# Patient Record
Sex: FEMALE | Race: WHITE | NOT HISPANIC OR LATINO | Employment: FULL TIME | URBAN - METROPOLITAN AREA
[De-identification: names, ages, dates, MRNs, and addresses within clinical notes are randomized per-mention and may not be internally consistent; named-entity substitution may affect disease eponyms.]

---

## 2022-10-24 ENCOUNTER — AMB VIDEO VISIT (OUTPATIENT)
Dept: OTHER | Facility: HOSPITAL | Age: 32
End: 2022-10-24

## 2022-10-24 VITALS — RESPIRATION RATE: 16 BRPM | TEMPERATURE: 99 F

## 2022-10-24 DIAGNOSIS — J01.40 ACUTE PANSINUSITIS, RECURRENCE NOT SPECIFIED: Primary | ICD-10-CM

## 2022-10-24 RX ORDER — AMOXICILLIN 875 MG/1
875 TABLET, COATED ORAL 2 TIMES DAILY
Qty: 20 TABLET | Refills: 0 | Status: SHIPPED | OUTPATIENT
Start: 2022-10-24 | End: 2022-11-03

## 2022-10-24 NOTE — PROGRESS NOTES
Video Visit - Sohan Mercer 27 y o  female MRN: [id number]    REQUIRED DOCUMENTATION:         1  This service was provided via [facility]  2  Provider located at [address] [place] [id number] [phone]  3  [employer] [employer] provider: TIMO Stearns  4  Identify all parties in room with patient during [employer] [employer] visit:  patient   5  After connecting through [employer], patient was identified by name and date of birth  Patient was then informed that this was a Telemedicine visit and that the exam was being conducted confidentially over secure lines  My office door was closed  No one else was in the room  Patient acknowledged consent and understanding of privacy and security of the Telemedicine visit  I informed the patient that I have reviewed their record in Epic and presented the opportunity for them to ask any questions regarding the visit today  The patient agreed to participate  This is a 27year old female here today for video visit  She started having symptoms about 6 days ago  She had sore throat, congestion and fever  Fever resolved  She continues to have headache, sinus congestion and low grade fever  No chest pain or sob/ chest congestion  She is vaccinated for covid  Tested positive for covid  No loss of taste or smell  She is 9 weeks pregnant  No cramping or abd pain, she had light spotting last week but no further symptoms  Patient will discuss with ob/gyn  Review of Systems   Constitutional: Positive for fatigue and fever  Negative for activity change and chills  HENT: Positive for congestion, rhinorrhea, sinus pressure and sinus pain  Respiratory: Negative for cough, shortness of breath and wheezing  Cardiovascular: Negative  Skin: Negative  Neurological: Negative  Psychiatric/Behavioral: Negative        Vitals:    10/24/22 1134   Resp: 16   Temp: 99 °F (37 2 °C)     Physical Exam  Constitutional:       General: She is not in acute distress  Appearance: Normal appearance  She is not ill-appearing or toxic-appearing  HENT:      Head: Normocephalic and atraumatic  Nose: Congestion present  Eyes:      Conjunctiva/sclera: Conjunctivae normal    Pulmonary:      Effort: Pulmonary effort is normal  No respiratory distress  Comments: No cough or audible wheezing during video visit  Musculoskeletal:      Cervical back: Normal range of motion  Skin:     Comments: No rash on head or neck  Neurological:      Mental Status: She is alert and oriented to person, place, and time  Psychiatric:         Mood and Affect: Mood normal          Behavior: Behavior normal          Thought Content: Thought content normal          Judgment: Judgment normal        Diagnoses and all orders for this visit:    Acute pansinusitis, recurrence not specified  -     amoxicillin (AMOXIL) 875 mg tablet; Take 1 tablet (875 mg total) by mouth 2 (two) times a day for 10 days      Patient Instructions     Will treat for sinus infection  Rest and drink extra fluids  Start antibiotic  Take probiotic  OTC cough and cold medication  Follow up with PCP  Go to ER with any worsening symptoms, chest pain or sob  As we discussed if you develop any spotting, cramping abd pain you need to go to ER  Sinusitis   WHAT YOU NEED TO KNOW:   Sinusitis is inflammation or infection of your sinuses  Sinusitis is most often caused by a virus  Acute sinusitis may last up to 12 weeks  Chronic sinusitis lasts longer than 12 weeks  Recurrent sinusitis means you have 4 or more infections in 1 year  DISCHARGE INSTRUCTIONS:   Return to the emergency department if:   · You have trouble breathing or wheezing that is getting worse  · You have a stiff neck, a fever, or a bad headache  · You cannot open your eye  · Your eyeball bulges out or you cannot move your eye       · You are more sleepy than normal, or you notice changes in your ability to think, move, or talk  · You have swelling of your forehead or scalp  Call your doctor if:   · You have vision changes, such as double vision  · Your eye and eyelid are red, swollen, and painful  · Your symptoms do not improve or go away after 10 days  · You have nausea and are vomiting  · Your nose is bleeding  · You have questions or concerns about your condition or care  Medicines: Your symptoms may go away on their own  Your healthcare provider may recommend watchful waiting for up to 10 days before starting antibiotics  You may need any of the following:  · Acetaminophen  decreases pain and fever  It is available without a doctor's order  Ask how much to take and how often to take it  Follow directions  Read the labels of all other medicines you are using to see if they also contain acetaminophen, or ask your doctor or pharmacist  Acetaminophen can cause liver damage if not taken correctly  Do not use more than 4 grams (4,000 milligrams) total of acetaminophen in one day  · NSAIDs , such as ibuprofen, help decrease swelling, pain, and fever  This medicine is available with or without a doctor's order  NSAIDs can cause stomach bleeding or kidney problems in certain people  If you take blood thinner medicine, always ask your healthcare provider if NSAIDs are safe for you  Always read the medicine label and follow directions  · Nasal steroid sprays  may help decrease inflammation in your nose and sinuses  · Decongestants  help reduce swelling and drain mucus in the nose and sinuses  They may help you breathe easier  · Antihistamines  help dry mucus in the nose and relieve sneezing  · Antibiotics  help treat or prevent a bacterial infection  · Take your medicine as directed  Contact your healthcare provider if you think your medicine is not helping or if you have side effects  Tell him or her if you are allergic to any medicine   Keep a list of the medicines, vitamins, and herbs you take  Include the amounts, and when and why you take them  Bring the list or the pill bottles to follow-up visits  Carry your medicine list with you in case of an emergency  Self-care:   · Rinse your sinuses as directed  Use a sinus rinse device to rinse your nasal passages with a saline (salt water) solution or distilled water  Do not use tap water  This will help thin the mucus in your nose and rinse away pollen and dirt  It will also help reduce swelling so you can breathe normally  · Use a humidifier  to increase air moisture in your home  This may make it easier for you to breathe and help decrease your cough  · Sleep with your head elevated  Place an extra pillow under your head before you go to sleep to help your sinuses drain  · Drink liquids as directed  Ask your healthcare provider how much liquid to drink each day and which liquids are best for you  Liquids will thin the mucus in your nose and help it drain  Avoid drinks that contain alcohol or caffeine  · Do not smoke, and avoid secondhand smoke  Nicotine and other chemicals in cigarettes and cigars can make your symptoms worse  Ask your healthcare provider for information if you currently smoke and need help to quit  E-cigarettes or smokeless tobacco still contain nicotine  Talk to your healthcare provider before you use these products  Prevent the spread of germs:   · Wash your hands often with soap and water  Wash your hands after you use the bathroom, change a child's diaper, or sneeze  Wash your hands before you prepare or eat food  · Stay away from people who are sick  Some germs spread easily and quickly through contact  Follow up with your doctor as directed: You may be referred to an ear, nose, and throat specialist  Write down your questions so you remember to ask them during your visits     © Copyright [employer] 2022 Information is for End User's use only and may not be sold, redistributed or otherwise used for commercial purposes  All illustrations and images included in CareNotes® are the copyrighted property of A D A M , Inc  or Hari Everett  The above information is an  only  It is not intended as medical advice for individual conditions or treatments  Talk to your doctor, nurse or pharmacist before following any medical regimen to see if it is safe and effective for you  Follow up with PCP if not improved, if symptoms are worse, go to the ER

## 2022-10-24 NOTE — CARE ANYWHERE EVISITS
[facility] - Gender: Female - Date of Birth: [id number]  Date: [id number] - Duration: 8 minutes  [facility]  Provider: Dylan GREENWOOD    Patient Contact Information  Address  [address] [place] [address]; [address]  [phone]    Visit Topics  Flu-Like Symptoms [Added By: Self - 2022-10-24]  have covid, symptoms still at 7 days and am pregnant [Added By: Self - 2022-10-24]    Triage Questions   What is your current physical address in the event of a medical emergency? Answer []  Are you allergic to any medications? Answer []  Are you now or could you be pregnant? Answer []  Do you have any immune system compromise or chronic lung   disease? Answer []  Do you have any vulnerable family members in the home (infant, pregnant, cancer, elderly)? Answer []     Conversation Transcripts  [0A][0A] [Notification] You are connected with Alberto Marshall, Urgent Care Specialist [0A][Notification] Jose Carlos Pickett is located in [address] [place] [address]  [0A][Notification] Jose Carlos Pickett has shared health history  Lawrence Snowch  [0A]    Diagnosis  Acute pansinusitis    Procedures  Value: [id number] Code: CPT-4 UNLISTED E&M SERVICE    Medications Prescribed    No prescriptions ordered    Electronically signed by: Alberto Hope(NPI [id number])

## 2022-10-24 NOTE — PATIENT INSTRUCTIONS
Will treat for sinus infection  Rest and drink extra fluids  Start antibiotic  Take probiotic  OTC cough and cold medication  Follow up with PCP  Go to ER with any worsening symptoms, chest pain or sob  As we discussed if you develop any spotting, cramping abd pain you need to go to ER  Sinusitis   WHAT YOU NEED TO KNOW:   Sinusitis is inflammation or infection of your sinuses  Sinusitis is most often caused by a virus  Acute sinusitis may last up to 12 weeks  Chronic sinusitis lasts longer than 12 weeks  Recurrent sinusitis means you have 4 or more infections in 1 year  DISCHARGE INSTRUCTIONS:   Return to the emergency department if:   You have trouble breathing or wheezing that is getting worse  You have a stiff neck, a fever, or a bad headache  You cannot open your eye  Your eyeball bulges out or you cannot move your eye  You are more sleepy than normal, or you notice changes in your ability to think, move, or talk  You have swelling of your forehead or scalp  Call your doctor if:   You have vision changes, such as double vision  Your eye and eyelid are red, swollen, and painful  Your symptoms do not improve or go away after 10 days  You have nausea and are vomiting  Your nose is bleeding  You have questions or concerns about your condition or care  Medicines: Your symptoms may go away on their own  Your healthcare provider may recommend watchful waiting for up to 10 days before starting antibiotics  You may need any of the following:  Acetaminophen  decreases pain and fever  It is available without a doctor's order  Ask how much to take and how often to take it  Follow directions  Read the labels of all other medicines you are using to see if they also contain acetaminophen, or ask your doctor or pharmacist  Acetaminophen can cause liver damage if not taken correctly  Do not use more than 4 grams (4,000 milligrams) total of acetaminophen in one day  NSAIDs , such as ibuprofen, help decrease swelling, pain, and fever  This medicine is available with or without a doctor's order  NSAIDs can cause stomach bleeding or kidney problems in certain people  If you take blood thinner medicine, always ask your healthcare provider if NSAIDs are safe for you  Always read the medicine label and follow directions  Nasal steroid sprays  may help decrease inflammation in your nose and sinuses  Decongestants  help reduce swelling and drain mucus in the nose and sinuses  They may help you breathe easier  Antihistamines  help dry mucus in the nose and relieve sneezing  Antibiotics  help treat or prevent a bacterial infection  Take your medicine as directed  Contact your healthcare provider if you think your medicine is not helping or if you have side effects  Tell him or her if you are allergic to any medicine  Keep a list of the medicines, vitamins, and herbs you take  Include the amounts, and when and why you take them  Bring the list or the pill bottles to follow-up visits  Carry your medicine list with you in case of an emergency  Self-care:   Rinse your sinuses as directed  Use a sinus rinse device to rinse your nasal passages with a saline (salt water) solution or distilled water  Do not use tap water  This will help thin the mucus in your nose and rinse away pollen and dirt  It will also help reduce swelling so you can breathe normally  Use a humidifier  to increase air moisture in your home  This may make it easier for you to breathe and help decrease your cough  Sleep with your head elevated  Place an extra pillow under your head before you go to sleep to help your sinuses drain  Drink liquids as directed  Ask your healthcare provider how much liquid to drink each day and which liquids are best for you  Liquids will thin the mucus in your nose and help it drain  Avoid drinks that contain alcohol or caffeine       Do not smoke, and avoid secondhand smoke  Nicotine and other chemicals in cigarettes and cigars can make your symptoms worse  Ask your healthcare provider for information if you currently smoke and need help to quit  E-cigarettes or smokeless tobacco still contain nicotine  Talk to your healthcare provider before you use these products  Prevent the spread of germs:   Wash your hands often with soap and water  Wash your hands after you use the bathroom, change a child's diaper, or sneeze  Wash your hands before you prepare or eat food  Stay away from people who are sick  Some germs spread easily and quickly through contact  Follow up with your doctor as directed: You may be referred to an ear, nose, and throat specialist  Write down your questions so you remember to ask them during your visits  © Copyright [employer] 2022 Information is for End User's use only and may not be sold, redistributed or otherwise used for commercial purposes  All illustrations and images included in CareNotes® are the copyrighted property of A D A M , Inc  or [facility] Rich Preciado   The above information is an  only  It is not intended as medical advice for individual conditions or treatments  Talk to your doctor, nurse or pharmacist before following any medical regimen to see if it is safe and effective for you

## 2022-10-27 ENCOUNTER — ULTRASOUND (OUTPATIENT)
Dept: OBGYN CLINIC | Facility: CLINIC | Age: 32
End: 2022-10-27

## 2022-10-27 VITALS
WEIGHT: 170 LBS | BODY MASS INDEX: 28.32 KG/M2 | RESPIRATION RATE: 18 BRPM | HEART RATE: 109 BPM | SYSTOLIC BLOOD PRESSURE: 129 MMHG | HEIGHT: 65 IN | DIASTOLIC BLOOD PRESSURE: 75 MMHG

## 2022-10-27 DIAGNOSIS — Z32.01 PREGNANCY TEST POSITIVE: Primary | ICD-10-CM

## 2022-10-27 LAB — SL AMB POCT URINE HCG: NORMAL

## 2022-10-27 PROCEDURE — 81025 URINE PREGNANCY TEST: CPT | Performed by: OBSTETRICS & GYNECOLOGY

## 2022-10-27 PROCEDURE — 76830 TRANSVAGINAL US NON-OB: CPT | Performed by: OBSTETRICS & GYNECOLOGY

## 2022-10-27 PROCEDURE — 99203 OFFICE O/P NEW LOW 30 MIN: CPT | Performed by: OBSTETRICS & GYNECOLOGY

## 2022-10-27 NOTE — PROGRESS NOTES
[facility] SCAN  Rm Viramontes; 1990  [id number]      Assessment  27 y o   at 9w5d presenting for amenorrhea  Pregnancy confirmed, dating consistent with LMP  BETINA 22  Plan  Diagnoses and all orders for this visit:    Pregnancy test positive  -     POCT urine HCG  -     Prenatal Panel; Future  -     Cystic fibrosis gene test; Future  -     Spinal muscular atrophy DNA; Future  -     Ambulatory Referral to Maternal Fetal Medicine; Future      - Prenatal vitamin  - Prenatal panel (slips given today)  - Discussed OTC Vit B6/Unisom  - Discussed genetic screening, will proceed with nuchal translucency scan  - Prenatal Nursing Intake in 2 weeks  - Prenatal H&P in 4 weeks     ____________________________________________________________      Subjective   Rm Viramontes is a 27 y o   with an LMP of Patient's last menstrual period was 2022  who presents for amenorrhea  She notes she took a home pregnancy test and it was positive  She has some mild nausea & heartburn    Patient notes that this pregnancy was semi-planned, she came off the pill but was not expecting to get pregnant so quickly  She had some minor spotting and cramping since her LMP        Objective  /75 (BP Location: Left arm, Patient Position: Sitting, Cuff Size: Adult)   Pulse (!) 109   Resp 18   Ht 5' 5" (1 651 m)   Wt 77 1 kg (170 lb)   LMP 2022   BMI 28 29 kg/m²       Transvaginal Pelvic Ultrasound  Burns IUP  CRL 2 97, consistent with EGA 9w5d  +yolk sac  +cardiac activity  FHR 160s  No adnexal masses appreciated    Homa Hand DO  10/27/22

## 2022-10-27 NOTE — PATIENT INSTRUCTIONS
Pregnancy   AMBULATORY CARE:   What you need to know about pregnancy:  A normal pregnancy lasts about 40 weeks  The first trimester lasts from your last period through the 12th week of pregnancy  The second trimester lasts from the 13th week through the 23rd week  The third trimester lasts from the 24th week until your baby is born  If you know the date of your last period, your healthcare provider can estimate your due date  You may give birth to your baby any time from 37 weeks to 2 weeks after your due date  Seek care immediately if:   You develop a severe headache that does not go away  You have new or increased vision changes, such as blurred or spotted vision  You have new or increased swelling in your face or hands  You have pain or cramping in your abdomen or low back  You have vaginal bleeding  Call your doctor or obstetrician if:   You have abdominal cramps, pressure, or tightening  You have a change in vaginal discharge  You cannot keep food or drinks down, and you are losing weight  You have chills or a fever  You have vaginal itching, burning, or pain  You have yellow, green, white, or foul-smelling vaginal discharge  You have pain or burning when you urinate, less urine than usual, or pink or bloody urine  You have questions or concerns about your condition or care  Prenatal care:  Prenatal care is a series of visits with your healthcare provider throughout your pregnancy  Prenatal care can help prevent problems during pregnancy and childbirth  At each prenatal visit, your provider will weigh you and check your blood pressure  He or she will also check your baby's heartbeat and growth  You may also need the following at some visits:  A pelvic exam  allows your healthcare provider to see your cervix (the bottom part of your uterus)  Your healthcare provider will use a speculum to open your vagina  He or she will check the size and shape of your uterus   At your first prenatal visit, you may also have a Pap smear  This is a test to check your cervix for abnormal cells  Blood tests  may be done to check for any of the following:     Gestational diabetes or anemia (low iron level)    Blood type or Rh factor, or certain birth defects    Immunity to certain diseases, such as chickenpox or rubella    An infection, such as a sexually transmitted infection, HIV, or hepatitis B    Hepatitis B  may need to be prevented or treated  Hepatitis B is inflammation of the liver caused by the hepatitis B virus (HBV)  HBV can spread from a mother to her baby during delivery  You will be checked for HBV as early as possible in the first trimester of each pregnancy  You need the test even if you received the hepatitis B vaccine or were tested before  You may need to have an HBV infection treated before you give birth  Urine tests  may also be done to check for sugar and protein  These can be signs of gestational diabetes or preeclampsia  Urine tests may also be done to check for signs of infection  A gestational diabetes screen  may be done  Your healthcare provider may order either a 1-step or 2-step oral glucose tolerance test (OGTT)  1-step OGTT:  Your blood sugar level will be tested after you have not eaten for 8 hours (fasting)  You will then be given a glucose drink  Your level will be tested again 1 hour and 2 hours after you finish the drink  2-step OGTT:  You do not have to fast for the first part of the test  You will have the glucose drink at any time of day  Your blood sugar level will be checked 1 hour later  If your blood sugar is higher than a certain level, another test will be ordered  You will fast and your blood sugar level will be tested  You will have the glucose drink  Your blood will be tested again 1 hour, 2 hours, and 3 hours after you finish the glucose drink  A fetal ultrasound  shows pictures of your baby inside your uterus   The pictures are used to check your baby's development, movement, and position  Genetic disorder screening tests  may be offered to you  These tests check your baby's risk for genetic disorders such as Down syndrome  A screening test may include blood tests and an ultrasound  Blood tests may be used to check your DNA or your partner's DNA  Genetic tests are not always accurate or complete  Your baby may be born with a genetic disorder that did not show up in the tests  Talk to your healthcare provider about any concerns you have with genetic testing  Body changes that may occur during your pregnancy:   Breast changes  you will experience include tenderness and tingling during the early part of your pregnancy  Your breasts will become larger  You may need to use a support bra  You may see a thin, yellow fluid, called colostrum, leak from your nipples during the second trimester  Colostrum is a liquid that changes to milk about 3 days after you give birth  Skin changes and stretch marks  may occur during your pregnancy  You may have red marks, called stretch marks, on your skin  Stretch marks will usually fade after pregnancy  Use lotion if your skin is dry and itchy  The skin on your face, around your nipples, and below your belly button may darken  Most of the time, your skin will return to its normal color after your baby is born  Morning sickness  is nausea and vomiting that can happen at any time of day  Avoid fatty and spicy foods  Eat small meals throughout the day instead of large meals  Nida may help to decrease nausea  Ask your healthcare provider about other ways of decreasing nausea and vomiting  Heartburn  may be caused by changes in your hormones during pregnancy  Your growing uterus may also push your stomach upward and force stomach acid to back up into your esophagus  Eat 4 or 5 small meals each day instead of large meals  Avoid spicy foods  Avoid eating right before bedtime           Constipation  may develop during your pregnancy  To treat constipation, eat foods high in fiber such as fiber cereals, beans, fruits, vegetables, whole-grain breads, and prune juice  Get regular exercise and drink plenty of water  Your healthcare provider may also suggest a fiber supplement to soften your bowel movements  Talk to your healthcare provider before you use any medicines to decrease constipation  Hemorrhoids  are enlarged veins in the rectal area  They may cause pain, itching, and bright red bleeding from your rectum  To decrease your risk for hemorrhoids, prevent constipation and do not strain to have a bowel movement  If you have hemorrhoids, soak in a tub of warm water to ease discomfort  Ask your healthcare provider how you can treat hemorrhoids  Leg cramps and swelling  may be caused by low calcium levels or the added weight of pregnancy  Raise your legs above the level of your heart to decrease swelling  During a leg cramp, stretch or massage the muscle that has the cramp  Heat may help decrease pain and muscle spasms  Apply heat on your muscle for 20 to 30 minutes every 2 hours for as many days as directed  Back pain  may occur as your baby grows  Do not stand for long periods of time or lift heavy items  Use good posture while you stand, squat, or bend  Wear low-heeled shoes with good support  Rest may also help to relieve back pain  Ask your healthcare provider about exercises you can do to strengthen your back muscles  Stay healthy during your pregnancy:       Eat a variety of healthy foods  Healthy foods include fruits, vegetables, whole-grain breads, low-fat dairy foods, beans, lean meats, and fish  Drink liquids as directed  Ask how much liquid to drink each day and which liquids are best for you  Limit caffeine to less than 200 milligrams each day  Limit your intake of fish to 2 servings each week   Choose fish low in mercury such as canned light tuna, shrimp, crab, salmon, cod, or tilapia  Do not  eat fish high in mercury such as swordfish, tilefish, rodney mackerel, and shark  Take prenatal vitamins as directed  Your need for certain vitamins and minerals, such as folic acid, increases during pregnancy  Prenatal vitamins provide some of the extra vitamins and minerals you need  Prenatal vitamins may also help to decrease the risk for certain birth defects  Ask how much weight you should gain during your pregnancy  Too much or too little weight gain can be unhealthy for you and your baby  Talk to your healthcare provider about exercise  Moderate exercise can help you stay fit  Your healthcare provider will help you plan an exercise program that is safe for you during pregnancy  Do not smoke  Smoking increases your risk for a miscarriage and heart and blood vessel problems  Smoking can cause your baby to be born too early or weigh less at birth  Quit smoking as soon as you think you might be pregnant  Ask your healthcare provider for information if you need help quitting  Do not drink alcohol  Alcohol passes from your body to your baby through the placenta  It can affect your baby's brain development and cause fetal alcohol syndrome (FAS)  FAS is a group of conditions that causes mental, behavior, and growth problems  Talk to your healthcare provider before you take any medicines  Many medicines may harm your baby if you take them when you are pregnant  Do not take any medicines, vitamins, herbs, or supplements without first talking to your healthcare provider  Never use illegal or street drugs (such as marijuana or cocaine) while you are pregnant  Safety tips:   Avoid hot tubs and saunas  Do not use a hot tub or sauna while you are pregnant, especially during your first trimester  Hot tubs and saunas may raise your baby's temperature and increase the risk for birth defects  Avoid toxoplasmosis    This is an infection caused by eating raw meat or being around infected cat feces  It can cause birth defects, miscarriages, and other problems  Wash your hands after you touch raw meat  Make sure any meat is well-cooked before you eat it  Avoid raw eggs and unpasteurized milk  Use gloves or ask someone else to clean your cat's litter box while you are pregnant  Ask your healthcare provider about travel  The most comfortable time to travel is during the second trimester  Ask your healthcare provider if you can travel after 36 weeks  You may not be able to travel in an airplane after 36 weeks  He or she may also recommend that you avoid long road trips  Follow up with your doctor or obstetrician as directed:  Go to all of your prenatal visits during your pregnancy  Write down your questions so you remember to ask them during your visits  © Copyright [employer] 2022 Information is for End User's use only and may not be sold, redistributed or otherwise used for commercial purposes  All illustrations and images included in CareNotes® are the copyrighted property of A D A M , Inc  or [facility] Rich Everett  The above information is an  only  It is not intended as medical advice for individual conditions or treatments  Talk to your doctor, nurse or pharmacist before following any medical regimen to see if it is safe and effective for you

## 2022-11-07 ENCOUNTER — APPOINTMENT (OUTPATIENT)
Dept: LAB | Facility: CLINIC | Age: 32
End: 2022-11-07

## 2022-11-07 DIAGNOSIS — Z32.01 PREGNANCY TEST POSITIVE: ICD-10-CM

## 2022-11-07 LAB
ABO GROUP BLD: NORMAL
BACTERIA UR QL AUTO: ABNORMAL /HPF
BASOPHILS # BLD AUTO: 0.05 THOUSANDS/ÂΜL (ref 0–0.1)
BASOPHILS NFR BLD AUTO: 0 % (ref 0–1)
BILIRUB UR QL STRIP: NEGATIVE
BLD GP AB SCN SERPL QL: NEGATIVE
CLARITY UR: ABNORMAL
COLOR UR: YELLOW
EOSINOPHIL # BLD AUTO: 0.09 THOUSAND/ÂΜL (ref 0–0.61)
EOSINOPHIL NFR BLD AUTO: 1 % (ref 0–6)
ERYTHROCYTE [DISTWIDTH] IN BLOOD BY AUTOMATED COUNT: 13.5 % (ref 11.6–15.1)
GLUCOSE UR STRIP-MCNC: NEGATIVE MG/DL
HCT VFR BLD AUTO: 37.1 % (ref 34.8–46.1)
HGB BLD-MCNC: 12 G/DL (ref 11.5–15.4)
HGB UR QL STRIP.AUTO: NEGATIVE
IMM GRANULOCYTES # BLD AUTO: 0.05 THOUSAND/UL (ref 0–0.2)
IMM GRANULOCYTES NFR BLD AUTO: 0 % (ref 0–2)
KETONES UR STRIP-MCNC: NEGATIVE MG/DL
LEUKOCYTE ESTERASE UR QL STRIP: ABNORMAL
LYMPHOCYTES # BLD AUTO: 3.14 THOUSANDS/ÂΜL (ref 0.6–4.47)
LYMPHOCYTES NFR BLD AUTO: 25 % (ref 14–44)
MCH RBC QN AUTO: 26.9 PG (ref 26.8–34.3)
MCHC RBC AUTO-ENTMCNC: 32.3 G/DL (ref 31.4–37.4)
MCV RBC AUTO: 83 FL (ref 82–98)
MONOCYTES # BLD AUTO: 0.71 THOUSAND/ÂΜL (ref 0.17–1.22)
MONOCYTES NFR BLD AUTO: 6 % (ref 4–12)
MUCOUS THREADS UR QL AUTO: ABNORMAL
NEUTROPHILS # BLD AUTO: 8.34 THOUSANDS/ÂΜL (ref 1.85–7.62)
NEUTS SEG NFR BLD AUTO: 68 % (ref 43–75)
NITRITE UR QL STRIP: NEGATIVE
NON-SQ EPI CELLS URNS QL MICRO: ABNORMAL /HPF
NRBC BLD AUTO-RTO: 0 /100 WBCS
PH UR STRIP.AUTO: 5.5 [PH]
PLATELET # BLD AUTO: 297 THOUSANDS/UL (ref 149–390)
PMV BLD AUTO: 10.1 FL (ref 8.9–12.7)
PROT UR STRIP-MCNC: ABNORMAL MG/DL
RBC # BLD AUTO: 4.46 MILLION/UL (ref 3.81–5.12)
RBC #/AREA URNS AUTO: ABNORMAL /HPF
RH BLD: NEGATIVE
SP GR UR STRIP.AUTO: 1.01 (ref 1–1.03)
SPECIMEN EXPIRATION DATE: NORMAL
UROBILINOGEN UR STRIP-ACNC: <2 MG/DL
WBC # BLD AUTO: 12.38 THOUSAND/UL (ref 4.31–10.16)
WBC #/AREA URNS AUTO: ABNORMAL /HPF
WBC CLUMPS # UR AUTO: PRESENT /UL

## 2022-11-08 ENCOUNTER — INITIAL PRENATAL (OUTPATIENT)
Dept: OBGYN CLINIC | Facility: CLINIC | Age: 32
End: 2022-11-08

## 2022-11-08 ENCOUNTER — PATIENT OUTREACH (OUTPATIENT)
Dept: OBGYN CLINIC | Facility: CLINIC | Age: 32
End: 2022-11-08

## 2022-11-08 DIAGNOSIS — Z34.01 PRIMIGRAVIDA IN FIRST TRIMESTER: ICD-10-CM

## 2022-11-08 DIAGNOSIS — Z34.91 PRENATAL CARE IN FIRST TRIMESTER: Primary | ICD-10-CM

## 2022-11-08 LAB
HBV SURFACE AG SER QL: NORMAL
HIV 1+2 AB+HIV1 P24 AG SERPL QL IA: NORMAL
RPR SER QL: NORMAL
RUBV IGG SERPL IA-ACNC: 148.8 IU/ML

## 2022-11-08 NOTE — LETTER
Work Letter    [employer]  1990  [address] [id number]    Dear [employer],      11/08/22        Your employee is a patient at [facility]  We recommend that all pregnant women:    1  Have a well-ventilated workspace  2  Wear low-heeled shoes  3  Work no more than 40 hours per week  4  Have a 15 minute break every 2 hours and at least 30 minutes for a meal break  5  Use good body mechanics by bending at your knees to avoid back strain and lift no more than 20 pounds without assistance  Will need assistance with lifting over 20 lbs  6  Have ready access to bathrooms and water  She may continue to work until her due date unless medical complications arise  We anticipate she may return to work in 6-8 weeks after delivery       Sincerely,  1 Lenard Everett

## 2022-11-08 NOTE — LETTER
Dentist Letter    Taylor Benito  1990  [address] [address] [address]          11/08/22    We have had several requests from local dentist requesting permission to perform procedures on our patients who are pregnant  We wish to respond with this letter regarding some of the more routine procedures that we have been asked about  The following procedures may be performed on our obstetric patients:   1  Administration of local anesthesia   2  Administration of antibiotics such as PCN, Ampicillin, and Erythromycin  3  Administration of pain medications such as Tylenol, Tylenol with codeine, and if needed Percocet  4  Shielded X-rays    Should you have any questions, please do not hesitate to contact at [phone]          Sincerely,    1 [facility]   [phone]

## 2022-11-08 NOTE — LETTER
[address] [facility]  1990  [address] [facility] [address]       11/08/22          Rivka Devine is a patient and under our care in our office  Kenny Shepherd's Estimated Date of Delivery: 5/27/23  Any questions or concerns feel free to contact our office       Thank you,  [address]  [id number] [facility]/Mitchell Awad [address]  [address] [facility]/Brad W[address]  [facility]/[address]  [phone]

## 2022-11-08 NOTE — LETTER
Proof of Pregnancy Letter    [facility]  1990  [address] [facility] [address]        11/08/22      [facility] is a patient at our facility  [address] Carlos Estimated Date of Delivery: 5/27/23       Any questions or concerns, please feel free to contact our office  Sincerely,    [address]

## 2022-11-08 NOTE — PROGRESS NOTES
OB INTAKE INTERVIEW  Pt presents for OB intake    OB History    Para Term  AB Living   1             SAB IAB Ectopic Multiple Live Births                  # Outcome Date GA Lbr Miguel/2nd Weight Sex Delivery Anes PTL Lv   1 Current              Hx of  delivery prior to 36 weeks 6 days:  N/A   If yes, place a referral for cervical surveillance at 16 weeks  Last Menstrual Period:    Patient's last menstrual period was 2022  Ultrasound date: 10/27/2022  9 weeks 5 days     Estimated Date of Delivery: 23   by LMP  H&P visit scheduled   @ 0745  with Dr Jose M Cole     Last pap smear: 3/30/21  Findings; lab pap smear results: no abnormalities    Current Issues:  Constipation :   Yes  Headaches :   Yes  Cramping:  No  Spotting :   No  PICA cravings :  No  FOB Involved:   Yes  Planned pregnancy:  Yes  I have these concerns about this prenatal patient:   C/o constipation  Encouraged to increase water and fiber intake  Information provided regarding stool softener use  Quit smoking cigarettes and marijuana in September when pregnancy discovered      Interview education  • [facility] P[facility] Essentials reviewed and discussed   • Baby and [address] Handout  • [facility] MFM Handout  • Discussed genetic testing  • Prenatal lab work: Scripts printed and given to pt  • Influenza vaccine given today: No  • Discussed Tdap vaccine  Immunizations:   Immunization History   Administered Date(s) Administered   • COVID-19 MODERNA VACC 0 5 ML IM 12/15/2021     Depression Screening Follow-up Plan: Patient's depression screening was negative with an [place] score of  1  Clinically patient does not have depression  No treatment is required     Nurse/Family Partnership- referral placed:  Yes   If yes, place referral for nurse family partnership  BMI Counseling:   Tobacco Cessation Counseling: former   Tobacco cessation counseling was not provided   The patient is sincerely urged to quit consumption of tobacco   Infection Screening: Does the pt have a hx of MRSA? No  If yes- please follow MRSA protocol and obtain a nasal swab for MRSA culture  The patient was oriented to our practice and all questions were answered    Interviewed by: [facility] 11/08/22

## 2022-11-08 NOTE — PROGRESS NOTES
VERNON RODRIGUEZ met with 33 y/o-M- G1- English speaking woman for pre radhames assessment  Pt resides with her spouse and both are very excited with the intended pregnancy  Pt denies any current usage of drug, alcohol or smoking  Pt reported Hx of Anxiety manage by PCP  No treatment for several years  Pt denies any issues at present time  Pt works full time as a  at [employer] and denies financial concerns  Pt has commercial insurance ans will call [address] to inquire about the program     Pt claimed both extended families are very supportive  Pt denies transportation issues  Pt denies any question or concerns at this time  VERNON RODRIGUEZ explained her role and gave contact information  Pt was encouraged to call at any time needed

## 2022-11-09 LAB — BACTERIA UR CULT: NORMAL

## 2022-11-15 LAB
CF COMMENT: NORMAL
CFTR MUT ANL BLD/T: NORMAL

## 2022-11-16 LAB
CLINICAL INFO: NORMAL
ETHNIC BACKGROUND STATED: NORMAL
GENE MUT TESTED BLD/T: NORMAL
GENERAL COMMENTS:: NORMAL
LAB DIRECTOR NAME PROVIDER: NORMAL
REASON FOR REFERRAL (NARRATIVE): NORMAL
REF LAB TEST METHOD: NORMAL
SL AMB DISCLAIMER: NORMAL
SL AMB GENETIC COUNSELOR: NORMAL
SMN1 GENE MUT ANL BLD/T: NORMAL
SPECIMEN SOURCE: NORMAL

## 2022-11-16 NOTE — PROGRESS NOTES
Please refer to the [facility] ultrasound report in Ob Procedures for additional information regarding today's visit

## 2022-11-17 ENCOUNTER — ROUTINE PRENATAL (OUTPATIENT)
Dept: PERINATAL CARE | Facility: OTHER | Age: 32
End: 2022-11-17

## 2022-11-17 VITALS
HEART RATE: 83 BPM | DIASTOLIC BLOOD PRESSURE: 64 MMHG | WEIGHT: 175.4 LBS | SYSTOLIC BLOOD PRESSURE: 118 MMHG | BODY MASS INDEX: 29.19 KG/M2

## 2022-11-17 DIAGNOSIS — Z36.82 ENCOUNTER FOR ANTENATAL SCREENING FOR NUCHAL TRANSLUCENCY: Primary | ICD-10-CM

## 2022-11-17 DIAGNOSIS — Z32.01 PREGNANCY TEST POSITIVE: ICD-10-CM

## 2022-11-17 DIAGNOSIS — Z3A.12 12 WEEKS GESTATION OF PREGNANCY: ICD-10-CM

## 2022-11-17 NOTE — LETTER
November 17, 2022     [id number] [facility] PROVIDER    Patient: Antonella Soto   YOB: 1990   Date of Visit: 11/17/2022       Dear   Provider: Thank you for referring Mio Fernandez to me for evaluation  Below are my notes for this consultation  If you have questions, please do not hesitate to call me  I look forward to following your patient along with you  Sincerely,        Catherine Mckeon MD        CC: No Recipients  Catherine Mckeon MD  11/16/2022  6:12 PM  Sign when Signing Visit  Please refer to the [facility] ultrasound report in Ob Procedures for additional information regarding today's visit

## 2022-12-01 ENCOUNTER — ROUTINE PRENATAL (OUTPATIENT)
Dept: OBGYN CLINIC | Facility: CLINIC | Age: 32
End: 2022-12-01

## 2022-12-01 VITALS
WEIGHT: 179 LBS | HEART RATE: 83 BPM | HEIGHT: 65 IN | SYSTOLIC BLOOD PRESSURE: 111 MMHG | BODY MASS INDEX: 29.82 KG/M2 | DIASTOLIC BLOOD PRESSURE: 73 MMHG

## 2022-12-01 DIAGNOSIS — Z34.92 PRENATAL CARE IN SECOND TRIMESTER: ICD-10-CM

## 2022-12-01 DIAGNOSIS — O99.619 GASTROESOPHAGEAL REFLUX IN PREGNANCY: ICD-10-CM

## 2022-12-01 DIAGNOSIS — Z23 NEED FOR INFLUENZA VACCINATION: ICD-10-CM

## 2022-12-01 DIAGNOSIS — K21.9 GASTROESOPHAGEAL REFLUX IN PREGNANCY: ICD-10-CM

## 2022-12-01 DIAGNOSIS — Z11.3 SCREEN FOR STD (SEXUALLY TRANSMITTED DISEASE): ICD-10-CM

## 2022-12-01 DIAGNOSIS — Z3A.14 14 WEEKS GESTATION OF PREGNANCY: ICD-10-CM

## 2022-12-01 DIAGNOSIS — Z72.51 HIGH RISK HETEROSEXUAL BEHAVIOR: Primary | ICD-10-CM

## 2022-12-01 DIAGNOSIS — O99.711: ICD-10-CM

## 2022-12-01 DIAGNOSIS — L81.1: ICD-10-CM

## 2022-12-01 DIAGNOSIS — F41.9 ANXIETY: ICD-10-CM

## 2022-12-01 PROBLEM — Z34.90 PREGNANCY: Status: ACTIVE | Noted: 2022-12-01

## 2022-12-01 PROBLEM — O99.719: Status: ACTIVE | Noted: 2022-12-01

## 2022-12-01 NOTE — ASSESSMENT & PLAN NOTE
- Doing well today, N/V improved  - Continue prenatal vitamin  - Prenatal labs: wnl, pelvic exam and GC/CT performed today  - Initial OB U/S: wnl  - Genetic screening: wnl  - Order MSAFP? ordered  - Level 2 U/S: scheduled 1/12/2022  - 28 week labs [ ]   - Tdap [ ]   - Rhogam [ ]   - Contraception plan: to be discussed  - RTO in 4 weeks  - All questions answered to patient satisfaction

## 2022-12-01 NOTE — PROGRESS NOTES
OB/GYN  PRENATAL H&P VISIT  Varun Malik  2022  8:34 AM  Dr Javier David MD    Patient presents for initial OB H&P  Accompanied by: alone  Semi planned pregnancy, FOB involved and supportive  Occupation:       Hx of  delivery prior to 36 weeks 6 days: no  Hx of hypertension:  no   Patient's last menstrual period was 2022  Estimated Date of Delivery: 23  Hx of STD/STI: no  Hx of recent travel or travel planned in the near future: no    Signs and Symptoms of pregnancy:  - morning sickness and nausea -> resolved  - Constipation: yes   - Headaches: no  - Cramping/spotting: no  - PICA cravings: no  - Diabetes: If you answer yes, please order 1 hr gtt testing, 50grams  • History of gestational diabetes no  • BMI >35  no  • Advance maternal age >35 no  • First degree relative with type 2 diabetes no  • History of PCOS no  • Current metformin use no  • Prior history of macrosomia or LGA no      Prenatal labs collected including: prenatal panel, varicella immunity yes  Last Pap:  2021  Tdap - counseled to be given after 27 weeks  Rh negative (RhoGam needed): yes  Influenza vaccine discussed and information sheet given  Vaccinated: to be given today  Immunization Record  Immunization History   Administered Date(s) Administered   • COVID-19 MODERNA VACC 0 5 ML IM 12/15/2021   • Influenza Quadrivalent Preservative Free 3 years and older IM 2021, 12/15/2021     Hx of MRSA: no  Dental visit with last 6 months - no  If no, recommendations discussed  Depression: history or current symptoms: no    Alcohol use in pregnancy: no  Drug use in past 12 months: no  Use of nicotine or recreational drug use: no    Review of Systems   Constitutional: Negative  HENT: Negative  Respiratory: Negative  Cardiovascular: Negative  Gastrointestinal: Negative  Genitourinary: Negative  Musculoskeletal: Negative  Neurological: Negative  Psychiatric/Behavioral: Negative  All other systems reviewed and are negative  History reviewed  No pertinent past medical history  Past Surgical History:   Procedure Laterality Date   • COLONOSCOPY     • TONSILLECTOMY     • UPPER GASTROINTESTINAL ENDOSCOPY     • WISDOM TOOTH EXTRACTION         Social History     Socioeconomic History   • Marital status: Single     Spouse name: Not on file   • Number of children: Not on file   • Years of education: Not on file   • Highest education level: Not on file   Occupational History   • Not on file   Tobacco Use   • Smoking status: Former     Types: Cigarettes     Quit date: 2022     Years since quittin 2   • Smokeless tobacco: Never   Vaping Use   • Vaping Use: Never used   Substance and Sexual Activity   • Alcohol use: Not Currently     Alcohol/week: 7 0 standard drinks     Types: 7 Glasses of wine per week   • Drug use: Not Currently     Types: Marijuana   • Sexual activity: Yes     Partners: Male     Birth control/protection: None   Other Topics Concern   • Not on file   Social History Narrative   • Not on file     Social Determinants of Health     Financial Resource Strain: Low Risk    • Difficulty of Paying Living Expenses: Not hard at all   Food Insecurity: No Food Insecurity   • Worried About Running Out of Food in the Last Year: Never true   • Ran Out of Food in the Last Year: Never true   Transportation Needs: No Transportation Needs   • Lack of Transportation (Medical): No   • Lack of Transportation (Non-Medical): No   Physical Activity: Not on file   Stress: No Stress Concern Present   • Feeling of Stress :  Only a little   Social Connections: Not on file   Intimate Partner Violence: Not At Risk   • Fear of Current or Ex-Partner: No   • Emotionally Abused: No   • Physically Abused: No   • Sexually Abused: No   Housing Stability: Low Risk    • Unable to Pay for Housing in the Last Year: No   • Number of Places Lived in the Last Year: 1   • Unstable Housing in the Last Year: No       OBJECTIVE  Vitals:    22 0700   BP: 111/73   Pulse: 83     Physical Exam  Constitutional:       General: She is not in acute distress  Appearance: She is normal weight  Genitourinary:      Genitourinary Comments: Well circumscribed areas of hyperpigmentation noted on mons and left groin area, not raised/painful/itchy etc , consistent with melasma of pregnancy   HENT:      Mouth/Throat:      Mouth: Mucous membranes are moist       Pharynx: Oropharynx is clear  Cardiovascular:      Rate and Rhythm: Normal rate  Pulmonary:      Effort: Pulmonary effort is normal  No respiratory distress  Abdominal:      General: Abdomen is flat  There is no distension  Palpations: Abdomen is soft  Tenderness: There is no abdominal tenderness  Neurological:      General: No focal deficit present  Mental Status: She is alert and oriented to person, place, and time  Mental status is at baseline  Skin:     General: Skin is warm and dry  Vitals reviewed  Exam conducted with a chaperone present  ASSESSMENT AND PLAN    27 y o , , with /73   Pulse 83   Ht 5' 5" (1 651 m)   Wt 81 2 kg (179 lb)   LMP 2022 , at 14w5d here for her prenatal H&P      Problem List        Digestive    Gastroesophageal reflux in pregnancy    Current Assessment & Plan     Taking B6 and TUMS prn            Musculoskeletal and Integument    Melasma of pregnancy    Current Assessment & Plan     Located on mons and right groin area            Genitourinary    Dysmenorrhea       Other    Anxiety    Current Assessment & Plan     Not on meds, no current mood symptoms  Discussed possibility of starting SSRI in third trimester if concerned about PPD         Menorrhagia with regular cycle    Pregnancy    Current Assessment & Plan     - Doing well today, N/V improved  - Continue prenatal vitamin  - Prenatal labs: wnl, pelvic exam and GC/CT performed today  - Initial OB U/S: wnl  - Genetic screening: wnl  - Order MSAFP? ordered  - Level 2 U/S: scheduled 1/12/2022  - 28 week labs [ ]   - Tdap [ ]   - Rhogam [ ]   - Contraception plan: to be discussed  - RTO in 4 weeks  - All questions answered to patient satisfaction               D/w Dr Teresita Wang MD  12/1/2022  8:34 AM

## 2022-12-01 NOTE — ASSESSMENT & PLAN NOTE
Not on meds, no current mood symptoms  Discussed possibility of starting SSRI in third trimester if concerned about PPD

## 2022-12-02 LAB
C TRACH DNA SPEC QL NAA+PROBE: NEGATIVE
N GONORRHOEA DNA SPEC QL NAA+PROBE: NEGATIVE

## 2022-12-30 ENCOUNTER — ROUTINE PRENATAL (OUTPATIENT)
Dept: OBGYN CLINIC | Facility: CLINIC | Age: 32
End: 2022-12-30

## 2022-12-30 VITALS
HEART RATE: 87 BPM | DIASTOLIC BLOOD PRESSURE: 73 MMHG | BODY MASS INDEX: 31.49 KG/M2 | RESPIRATION RATE: 18 BRPM | WEIGHT: 189 LBS | HEIGHT: 65 IN | SYSTOLIC BLOOD PRESSURE: 114 MMHG

## 2022-12-30 DIAGNOSIS — Z34.92 PRENATAL CARE IN SECOND TRIMESTER: ICD-10-CM

## 2022-12-30 DIAGNOSIS — Z3A.18 18 WEEKS GESTATION OF PREGNANCY: Primary | ICD-10-CM

## 2022-12-30 NOTE — PROGRESS NOTES
Wendy Al presents today for routine OB visit at 18w6d  She is a   Blood Pressure: 114/73  Wt=85 7 kg (189 lb); Body mass index is 31 45 kg/m² ; TWG=8 618 kg (19 lb)  Fetal Heart Rate: 154; Fundal Height (cm): 19 cm  Abdomen: gravid, soft, non-tender  She reports no complaints or concerns  Denies uterine contractions or persistent cramping  Denies vaginal bleeding or leaking of fluid  Reports fetal movement  Scheduled for ultrasound with MFM in two weeks  Reviewed common discomforts of pregnancy in second trimester and warning signs  Advised to continue medications and return in 4 weeks  Current Outpatient Medications   Medication Instructions   • Prenatal Vit-Fe Fumarate-FA (PRENATAL PO) Oral     Laboratory workup: initial OB labs (WNL); 28 week labs (order around 28 wk); 36 week cultures (collect @36w)    Vaccinations: influenza (given 22); Tdap (will offer after 27 weeks)    Postpartum contraception: desires unsure    Fetal Ultrasounds:  22 12w5d: Normal growth  Normal nuchal translucency       Pregnancy Problems (from 10/27/22 to present)     Problem Noted Resolved    18 weeks gestation of pregnancy 2022 by TIMO Guzmán No    Pregnancy 2022 by Joanna Morgan MD No    Gastroesophageal reflux in pregnancy 2022 by Joanna Morgan MD No    Melasma of pregnancy 2022 by Joanna Morgan MD No

## 2023-01-11 NOTE — PROGRESS NOTES
Please refer to the [facility] ultrasound report in Ob Procedures for additional information regarding today's visit

## 2023-01-12 ENCOUNTER — ROUTINE PRENATAL (OUTPATIENT)
Facility: HOSPITAL | Age: 33
End: 2023-01-12
Attending: OBSTETRICS & GYNECOLOGY

## 2023-01-12 VITALS
DIASTOLIC BLOOD PRESSURE: 66 MMHG | SYSTOLIC BLOOD PRESSURE: 110 MMHG | BODY MASS INDEX: 32.62 KG/M2 | HEART RATE: 92 BPM | HEIGHT: 65 IN | WEIGHT: 195.8 LBS

## 2023-01-12 DIAGNOSIS — Z3A.20 20 WEEKS GESTATION OF PREGNANCY: Primary | ICD-10-CM

## 2023-01-12 DIAGNOSIS — O44.42 LOW-LYING PLACENTA WITHOUT HEMORRHAGE, SECOND TRIMESTER: ICD-10-CM

## 2023-01-12 DIAGNOSIS — Z36.86 ENCOUNTER FOR ANTENATAL SCREENING FOR CERVICAL LENGTH: ICD-10-CM

## 2023-01-12 DIAGNOSIS — Z36.3 ENCOUNTER FOR ANTENATAL SCREENING FOR MALFORMATIONS: ICD-10-CM

## 2023-01-12 NOTE — LETTER
January 14, 2023     [id number] [facility] PROVIDER    Patient: Karen Thompson   YOB: 1990   Date of Visit: 1/12/2023       Dear   Provider: Thank you for referring Noa James to me for evaluation  Below are my notes for this consultation  If you have questions, please do not hesitate to call me  I look forward to following your patient along with you           Sincerely,        Cain Goldmann, MD        CC: No Recipients  Cain Goldmann, MD  1/11/2023  6:20 PM  Sign when Signing Visit  Please refer to the [facility] ultrasound report in Ob Procedures for additional information regarding today's visit

## 2023-01-12 NOTE — PROGRESS NOTES
Ultrasound Probe Disinfection    A transvaginal ultrasound was performed  Prior to use, disinfection was performed with High Level Disinfection Process ([employer])  Probe serial number A3: [id number] was used        [facility]  01/12/23  12:48 PM

## 2023-01-25 PROBLEM — Z3A.22 22 WEEKS GESTATION OF PREGNANCY: Status: ACTIVE | Noted: 2022-12-30

## 2023-01-25 PROBLEM — Z67.91 RH NEGATIVE STATUS DURING PREGNANCY: Status: ACTIVE | Noted: 2023-01-25

## 2023-01-25 PROBLEM — O26.899 RH NEGATIVE STATUS DURING PREGNANCY: Status: ACTIVE | Noted: 2023-01-25

## 2023-01-25 NOTE — PROGRESS NOTES
[address]  [id number]  1990        A/P:  Problem List        Digestive    Gastroesophageal reflux in pregnancy       Musculoskeletal and Integument    Melasma of pregnancy       Genitourinary    Dysmenorrhea       Other    Anxiety    Menorrhagia with regular cycle    22 weeks gestation of pregnancy    Overview     - Doing well today, N/V improved  - Continue prenatal vitamin  - Prenatal labs: wnl  - Initial OB U/S: wnl  - Genetic screening: wnl  - MSAFP ordered, not done in time  - Level 2 U/S: low lying placenta 14 6mm from os, repeat US at 28 weeks  - 28 week labs [ ]   - Tdap [ ]   - Rhogam [ ]   - Contraception plan: likely declines  - RTO in 4 weeks  - All questions answered to patient satisfaction         Rh negative status during pregnancy    Overview     Will need rhogam at 29 weeks                S: 27 y o   22w5d here for PN visit  She denies contractions  She denies leakage of fluid and vaginal bleeding  She is unsure if she feels fetal movement  [place]anning [place] to [place] to see family in about 2 months, deciding between [address] and driving  O:  Vitals:    23 0700   BP: 112/74   Pulse: 86     Physical Exam  GEN: The patient was alert and oriented x3, pleasant well-appearing female in no acute distress     CV: Regular rate  PULM: Non-labored respirations  MSK: Normal gait  Skin: Warm, dry  Neuro: No focal deficits  Psych: Normal affect and judgement, cooperative    Fetal Heart Rate: 150       D/w Dr Giovana Simons MD  OB/GYN PGY-2  2023  8:19 AM

## 2023-01-26 ENCOUNTER — ROUTINE PRENATAL (OUTPATIENT)
Dept: OBGYN CLINIC | Facility: CLINIC | Age: 33
End: 2023-01-26

## 2023-01-26 VITALS
HEART RATE: 86 BPM | BODY MASS INDEX: 32.65 KG/M2 | HEIGHT: 65 IN | WEIGHT: 196 LBS | SYSTOLIC BLOOD PRESSURE: 112 MMHG | DIASTOLIC BLOOD PRESSURE: 74 MMHG

## 2023-01-26 DIAGNOSIS — O26.892 RH NEGATIVE STATUS DURING PREGNANCY IN SECOND TRIMESTER: ICD-10-CM

## 2023-01-26 DIAGNOSIS — Z3A.22 22 WEEKS GESTATION OF PREGNANCY: Primary | ICD-10-CM

## 2023-01-26 DIAGNOSIS — O99.619 GASTROESOPHAGEAL REFLUX IN PREGNANCY: ICD-10-CM

## 2023-01-26 DIAGNOSIS — Z67.91 RH NEGATIVE STATUS DURING PREGNANCY IN SECOND TRIMESTER: ICD-10-CM

## 2023-01-26 DIAGNOSIS — K21.9 GASTROESOPHAGEAL REFLUX IN PREGNANCY: ICD-10-CM

## 2023-02-22 PROBLEM — Z3A.26 26 WEEKS GESTATION OF PREGNANCY: Status: ACTIVE | Noted: 2022-12-30

## 2023-02-22 NOTE — PROGRESS NOTES
[address]  [id number]  1990        A/P:  Problem List        Digestive    Gastroesophageal reflux in pregnancy       Musculoskeletal and Integument    Melasma of pregnancy       Genitourinary    Dysmenorrhea       Other    Anxiety    Menorrhagia with regular cycle    26 weeks gestation of pregnancy    Overview     - Doing well today, N/V improved  - Continue prenatal vitamin  - Prenatal labs: wnl  - Initial OB U/S: wnl  - Genetic screening: wnl  - MSAFP ordered, not done in time  - Level 2 U/S: low lying placenta 14 6mm from os, repeat US at 28 weeks (sched 3/10)  - 28 week labs ordered today [ ]   - Tdap [ ] next visit  - Rhogam [ ] next visit  - Contraception plan: possible depo at 6 weeks  - RTO in 3 weeks  - All questions answered to patient satisfaction         Rh negative status during pregnancy    Overview     Will need rhogam at 29 weeks                S: 27 y o   26w5d here for PN visit  She denies contractions  She denies leakage of fluid and vaginal bleeding  She has started to feel good fetal movement  O:  Vitals:    23 0700   BP: 121/67   Pulse: 92     Physical Exam  GEN: The patient was alert and oriented x3, pleasant well-appearing female in no acute distress     CV: Regular rate  PULM: Non-labored respirations  MSK: Normal gait  Skin: Warm, dry  Neuro: No focal deficits  Psych: Normal affect and judgement, cooperative    Fetal Heart Rate: 148       D/w Dr Chris Figueroa MD  OB/GYN PGY-2  2023  8:09 AM

## 2023-02-23 ENCOUNTER — ROUTINE PRENATAL (OUTPATIENT)
Dept: OBGYN CLINIC | Facility: CLINIC | Age: 33
End: 2023-02-23

## 2023-02-23 VITALS
HEART RATE: 92 BPM | DIASTOLIC BLOOD PRESSURE: 67 MMHG | SYSTOLIC BLOOD PRESSURE: 121 MMHG | BODY MASS INDEX: 34.82 KG/M2 | HEIGHT: 65 IN | WEIGHT: 209 LBS

## 2023-02-23 DIAGNOSIS — O26.892 RH NEGATIVE STATUS DURING PREGNANCY IN SECOND TRIMESTER: ICD-10-CM

## 2023-02-23 DIAGNOSIS — Z3A.26 26 WEEKS GESTATION OF PREGNANCY: Primary | ICD-10-CM

## 2023-02-23 DIAGNOSIS — Z67.91 RH NEGATIVE STATUS DURING PREGNANCY IN SECOND TRIMESTER: ICD-10-CM

## 2023-02-24 ENCOUNTER — APPOINTMENT (OUTPATIENT)
Dept: LAB | Facility: CLINIC | Age: 33
End: 2023-02-24

## 2023-02-24 DIAGNOSIS — Z3A.26 26 WEEKS GESTATION OF PREGNANCY: ICD-10-CM

## 2023-02-24 LAB
ABO GROUP BLD: NORMAL
BLD GP AB SCN SERPL QL: NEGATIVE
ERYTHROCYTE [DISTWIDTH] IN BLOOD BY AUTOMATED COUNT: 13.3 % (ref 11.6–15.1)
GLUCOSE 1H P 50 G GLC PO SERPL-MCNC: 111 MG/DL (ref 40–134)
HCT VFR BLD AUTO: 35.5 % (ref 34.8–46.1)
HGB BLD-MCNC: 11.4 G/DL (ref 11.5–15.4)
MCH RBC QN AUTO: 27.5 PG (ref 26.8–34.3)
MCHC RBC AUTO-ENTMCNC: 32.1 G/DL (ref 31.4–37.4)
MCV RBC AUTO: 86 FL (ref 82–98)
PLATELET # BLD AUTO: 292 THOUSANDS/UL (ref 149–390)
PMV BLD AUTO: 10.4 FL (ref 8.9–12.7)
RBC # BLD AUTO: 4.14 MILLION/UL (ref 3.81–5.12)
RH BLD: NEGATIVE
SPECIMEN EXPIRATION DATE: NORMAL
TREPONEMA PALLIDUM IGG+IGM AB [PRESENCE] IN SERUM OR PLASMA BY IMMUNOASSAY: NORMAL
WBC # BLD AUTO: 13.48 THOUSAND/UL (ref 4.31–10.16)

## 2023-03-07 ENCOUNTER — ULTRASOUND (OUTPATIENT)
Facility: HOSPITAL | Age: 33
End: 2023-03-07

## 2023-03-07 VITALS
HEART RATE: 90 BPM | DIASTOLIC BLOOD PRESSURE: 80 MMHG | BODY MASS INDEX: 35.65 KG/M2 | WEIGHT: 214 LBS | SYSTOLIC BLOOD PRESSURE: 130 MMHG | HEIGHT: 65 IN

## 2023-03-07 DIAGNOSIS — Z3A.28 28 WEEKS GESTATION OF PREGNANCY: Primary | ICD-10-CM

## 2023-03-07 DIAGNOSIS — Z36.89 ENCOUNTER FOR ULTRASOUND TO ASSESS FETAL GROWTH: ICD-10-CM

## 2023-03-07 DIAGNOSIS — Z36.2 ENCOUNTER FOR FOLLOW-UP ULTRASOUND OF FETAL ANATOMY: ICD-10-CM

## 2023-03-07 DIAGNOSIS — O44.40 LOW-LYING PLACENTA: ICD-10-CM

## 2023-03-07 NOTE — PROGRESS NOTES
[address] [place] [address] [employer]: Sheri Fleming was seen today at 28w3d for followup placental location ultrasound with fetal growth measurement  See ultrasound report under "OB Procedures" tab    Please don't hesitate to contact our office with any concerns or questions   -Sarah Louise MD

## 2023-03-07 NOTE — PROGRESS NOTES
Ultrasound Probe Disinfection    A transvaginal ultrasound was performed  Prior to use, disinfection was performed with High Level Disinfection Process ([employer])  Probe serial number A1: [id number] was used        Maxcine Knuckles  03/07/23  2:49 PM

## 2023-03-07 NOTE — LETTER
March 7, 2023     Subha Ayala MD  [address]    Patient: La Nena Espinoza   YOB: 1990   Date of Visit: 3/7/2023       Dear Dr Kendrick Syrian: Thank you for referring Ar Riddle to me for evaluation  Below are my notes for this consultation  If you have questions, please do not hesitate to call me  I look forward to following your patient along with you  Sincerely,        Carli Harley MD        CC: No Recipients  Carli Harley MD  3/7/2023  3:30 PM  Sign when Signing Visit  [address] [place] [address] [employer]: La Nena Espinoza was seen today at 28w3d for followup placental location ultrasound with fetal growth measurement  See ultrasound report under "OB Procedures" tab    Please don't hesitate to contact our office with any concerns or questions   -Carli Harley MD

## 2023-03-15 PROBLEM — Z3A.29 29 WEEKS GESTATION OF PREGNANCY: Status: ACTIVE | Noted: 2022-12-30

## 2023-03-15 NOTE — PROGRESS NOTES
[address]  [id number]  1990        A/P:  Problem List        Digestive    Gastroesophageal reflux in pregnancy       Musculoskeletal and Integument    Melasma of pregnancy       Genitourinary    Dysmenorrhea       Other    Anxiety    Menorrhagia with regular cycle    29 weeks gestation of pregnancy    Overview     - Doing well today, N/V improved  - Continue prenatal vitamin  - Prenatal labs: wnl  - Initial OB U/S: wnl  - Genetic screening: wnl  - MSAFP ordered, not done in time  - Level 2 U/S: low lying placenta 14 6mm from os, repeat US at 28 weeks -> RESOLVED       Will [facility] out to [facility] about possible repeat growth due to 45+ pound WG  - 28 week labs wnl  - Tdap given today  - Rhogam given today  - Delivery consent signed 3/16/23  - Contraception plan: possible depo at 6 weeks  - RTO in 2 weeks  - All questions answered to patient satisfaction         Rh negative status during pregnancy    Overview     Will need rhogam at 28 weeks                S: 27 y o   29w5d here for PN visit  She denies contractions  She denies leakage of fluid and vaginal bleeding  She has felt good fetal movement  O:  Vitals:    23 0700   BP: 132/65   Pulse: 99     Physical Exam  GEN: The patient was alert and oriented x3, pleasant well-appearing female in no acute distress     CV: Regular rate  PULM: Non-labored respirations  MSK: Normal gait  Skin: Warm, dry  Neuro: No focal deficits  Psych: Normal affect and judgement, cooperative    Fetal Heart Rate: 147       D/w Dr Nadia Casey MD  OB/GYN PGY-2  3/16/2023  8:30 AM

## 2023-03-16 ENCOUNTER — ROUTINE PRENATAL (OUTPATIENT)
Dept: OBGYN CLINIC | Facility: CLINIC | Age: 33
End: 2023-03-16

## 2023-03-16 VITALS
WEIGHT: 217.4 LBS | HEART RATE: 99 BPM | BODY MASS INDEX: 36.22 KG/M2 | SYSTOLIC BLOOD PRESSURE: 132 MMHG | HEIGHT: 65 IN | DIASTOLIC BLOOD PRESSURE: 65 MMHG

## 2023-03-16 DIAGNOSIS — Z3A.29 29 WEEKS GESTATION OF PREGNANCY: ICD-10-CM

## 2023-03-16 DIAGNOSIS — Z67.91 RH NEGATIVE STATUS DURING PREGNANCY IN THIRD TRIMESTER: Primary | ICD-10-CM

## 2023-03-16 DIAGNOSIS — O26.893 RH NEGATIVE STATUS DURING PREGNANCY IN THIRD TRIMESTER: Primary | ICD-10-CM

## 2023-03-16 DIAGNOSIS — Z23 NEED FOR TDAP VACCINATION: ICD-10-CM

## 2023-03-16 DIAGNOSIS — Z34.93 PRENATAL CARE IN THIRD TRIMESTER: ICD-10-CM

## 2023-03-16 DIAGNOSIS — O26.03 EXCESSIVE WEIGHT GAIN DURING PREGNANCY IN THIRD TRIMESTER: ICD-10-CM

## 2023-03-16 LAB
DME PARACHUTE DELIVERY DATE REQUESTED: NORMAL
DME PARACHUTE ITEM DESCRIPTION: NORMAL
DME PARACHUTE ORDER STATUS: NORMAL
DME PARACHUTE SUPPLIER NAME: NORMAL
DME PARACHUTE SUPPLIER PHONE: NORMAL

## 2023-03-16 NOTE — PROGRESS NOTES
28 week education packet provided to patient on 03/16/23  Included in packet:   Third Trimester paperwork  Delivery consent   Birthing room support person rules and acknowledgment  Birth Plan   Welcome information  Birth certificate worksheet   Consent for Photographers  Perineal/ Vaginal massage   Pediatric practices and locations   Breast pump initiated today(pt will pick from link)  TDAP & Rhogam given today

## 2023-03-17 ENCOUNTER — TELEPHONE (OUTPATIENT)
Facility: HOSPITAL | Age: 33
End: 2023-03-17

## 2023-03-17 NOTE — TELEPHONE ENCOUNTER
Pt contacted to schedule OB referral for growth ultrasound  Pt offered multiple days and times  Pt scheduled for 4/19/23

## 2023-04-02 PROBLEM — Z3A.32 32 WEEKS GESTATION OF PREGNANCY: Status: ACTIVE | Noted: 2022-12-30

## 2023-04-03 ENCOUNTER — ROUTINE PRENATAL (OUTPATIENT)
Dept: OBGYN CLINIC | Facility: CLINIC | Age: 33
End: 2023-04-03

## 2023-04-03 VITALS
SYSTOLIC BLOOD PRESSURE: 115 MMHG | DIASTOLIC BLOOD PRESSURE: 68 MMHG | HEIGHT: 65 IN | BODY MASS INDEX: 37.92 KG/M2 | HEART RATE: 83 BPM | WEIGHT: 227.6 LBS

## 2023-04-03 DIAGNOSIS — Z67.91 RH NEGATIVE STATUS DURING PREGNANCY IN THIRD TRIMESTER: ICD-10-CM

## 2023-04-03 DIAGNOSIS — O26.893 RH NEGATIVE STATUS DURING PREGNANCY IN THIRD TRIMESTER: ICD-10-CM

## 2023-04-03 DIAGNOSIS — Z3A.32 32 WEEKS GESTATION OF PREGNANCY: Primary | ICD-10-CM

## 2023-04-03 NOTE — PROGRESS NOTES
OB/GYN  PN Visit  Gopi Negro  [id number]  4/3/2023  8:15 AM  Dr Lucy Che MD    S: 27 y o   32w1d here for PN visit  She has no obstetric complaints, including pelvic pain, contractions, vaginal bleeding, loss of fluid, or decreased fetal movement  O:  Vitals:    23 0700   BP: 115/68   Pulse: 83         Physical examination   Cardio-pulmonar  Normal vesicular murmur, no rales,  nonlabored breathing , normal S1/S2 no cardiac murmurs , no gallops   Abdomen  Soft , no tender, no signs or peritoneal irritation   Extremities  mobiles no edemas  Fundal height: 32   bpm, on bedside US  Presentation: Vertex, placenta anterior    A/P:    Problem List Items Addressed This Visit    None      D 27 y o   32w1d  here for prenatal care without obstetric complaints today  In this visit we addressed:    Problem List        Digestive    Gastroesophageal reflux in pregnancy       Musculoskeletal and Integument    Melasma of pregnancy       Genitourinary    Dysmenorrhea       Other    Anxiety    Menorrhagia with regular cycle    32 weeks gestation of pregnancy    Overview     - Doing well today, N/V improved  - Continue prenatal vitamin  - Prenatal labs: wnl  - Initial OB U/S: wnl  - Genetic screening: wnl  - MSAFP ordered, not done in time  - Level 2 U/S: low lying placenta 14 6mm from os, repeat US at 28 weeks -> RESOLVED   Last EFW 3/7/23 EFW  1426 grams - 3 lbs 2 oz   (81%), growth 23  - 28 week labs wnl  - Tdap given at 29w  - Rhogam given at 29w  - Delivery consent signed 3/16/23  - Desire Epidural  - Breastfeeding   - Follow up in 2 weeks  - Contraception plan: possible depo at 6 weeks         Rh negative status during pregnancy    Overview     s/p rhogam at 29 weeks              Patient d/w/Dr Omkar Bullock MD  4/3/2023  8:15 AM

## 2023-04-17 PROBLEM — Z3A.34 34 WEEKS GESTATION OF PREGNANCY: Status: ACTIVE | Noted: 2022-12-30

## 2023-04-19 PROBLEM — O35.EXX0 PYELECTASIS OF FETUS ON PRENATAL ULTRASOUND: Status: ACTIVE | Noted: 2023-04-19

## 2023-05-01 ENCOUNTER — ROUTINE PRENATAL (OUTPATIENT)
Dept: OBGYN CLINIC | Facility: CLINIC | Age: 33
End: 2023-05-01

## 2023-05-01 VITALS
BODY MASS INDEX: 38.62 KG/M2 | DIASTOLIC BLOOD PRESSURE: 74 MMHG | HEIGHT: 65 IN | WEIGHT: 231.8 LBS | HEART RATE: 97 BPM | SYSTOLIC BLOOD PRESSURE: 123 MMHG

## 2023-05-01 DIAGNOSIS — Z3A.36 36 WEEKS GESTATION OF PREGNANCY: ICD-10-CM

## 2023-05-01 DIAGNOSIS — Z34.93 PRENATAL CARE IN THIRD TRIMESTER: Primary | ICD-10-CM

## 2023-05-01 NOTE — PROGRESS NOTES
OB/GYN prenatal visit    S: 27 y o   36w2d here for PN visit  She denies pelvic pain, contractions, vaginal bleeding, loss of fluid, and reports good fetal movement  O:  Vitals:    23 0800   BP: 123/74   Pulse: 97       Gen: no acute distress, nonlabored breathing  FHR: 139    A/P:     Problem List     36 weeks gestation of pregnancy    Overview     Ultrasound 21 EFW 71%ile, AC 95%ile, DOUGLAS 20 with LVP 7 9, f/u US scheduled 23  TDAP vaccine 3/16/23  Delivery consent signed 3/16/23  GBS collected 23  Contraception: Considering Depo Provera at 6 weeks postpartum  Birth plan: Epidural  Discussed  labor precautions and fetal kick counts    Continue prenatal vitamins   Return to clinic in 1 week             Rh negative status during pregnancy    Overview     s/p rhogam at 29 weeks         Pyelectasis of fetus on prenatal ultrasound    Overview     Per [facility] US on 23: The left kidney shows minimal pyelectasis to 7 mm  There is no evidence for caliectasis or hydronephrosis  Recommend a  US at least 67 hrs after birth                     David Logan MD  2023  8:39 AM

## 2023-05-03 LAB — GP B STREP DNA SPEC QL NAA+PROBE: POSITIVE

## 2023-05-08 ENCOUNTER — ROUTINE PRENATAL (OUTPATIENT)
Dept: OBGYN CLINIC | Facility: CLINIC | Age: 33
End: 2023-05-08

## 2023-05-08 VITALS
DIASTOLIC BLOOD PRESSURE: 66 MMHG | SYSTOLIC BLOOD PRESSURE: 112 MMHG | HEIGHT: 65 IN | BODY MASS INDEX: 38.99 KG/M2 | HEART RATE: 82 BPM | WEIGHT: 234 LBS

## 2023-05-08 DIAGNOSIS — O26.893 RH NEGATIVE STATUS DURING PREGNANCY IN THIRD TRIMESTER: ICD-10-CM

## 2023-05-08 DIAGNOSIS — B95.1 POSITIVE GBS TEST: ICD-10-CM

## 2023-05-08 DIAGNOSIS — Z3A.37 37 WEEKS GESTATION OF PREGNANCY: Primary | ICD-10-CM

## 2023-05-08 DIAGNOSIS — Z67.91 RH NEGATIVE STATUS DURING PREGNANCY IN THIRD TRIMESTER: ICD-10-CM

## 2023-05-08 DIAGNOSIS — O35.EXX0 PYELECTASIS OF FETUS ON PRENATAL ULTRASOUND: ICD-10-CM

## 2023-05-08 NOTE — PROGRESS NOTES
OB/GYN  PN Visit  Tien Soni  [id number]  2023  8:05 AM  Dr Rafael Chang MD    S: 27 y o   37w2d here for PN visit  She has no obstetric complaints, including pelvic pain, contractions, vaginal bleeding, loss of fluid, or decreased fetal movement  O:  Vitals:    23 0700   BP: 112/66   Pulse: 82         Physical examination   Cardio-pulmonar  Normal vesicular murmur, no rales,  nonlabored breathing , normal S1/S2 no cardiac murmurs , no gallops   Abdomen  Soft , no tender, no signs or peritoneal irritation   Extremities  mobiles no edemas  Fundal height: 37  FHR 140s by bedside US  Presentation: vertex    A/P:    Problem List Items Addressed This Visit    None      D 27 y o   37w2d  here for prenatal care without obstetric complaints today  In this visit we addressed:    Problem List        Digestive    Gastroesophageal reflux in pregnancy       Musculoskeletal and Integument    Melasma of pregnancy       Genitourinary    Dysmenorrhea       Other    Anxiety    Menorrhagia with regular cycle    37 weeks gestation of pregnancy    Overview     Doing well today, N/V improved  - Continue prenatal vitamin  - Prenatal labs: wnl  - Initial OB U/S: wnl  - Genetic screening: wnl  - MSAFP ordered, not done in time  - Ultrasound 21 EFW 71%ile, AC 95%ile, DOUGLAS 20 with LVP 7 9, f/u US scheduled 23  - 28 week labs wnl  - Tdap given at 29w  - Rhogam given at 29w  - Delivery consent signed 3/16/23  - Desire Epidural  - Breastfeeding   - Follow up in 2 weeks  - Contraception plan:  depo provera  - Return to clinic in 1 week             Rh negative status during pregnancy    Overview     s/p rhogam at 29 weeks         Pyelectasis of fetus on prenatal ultrasound    Overview     Per [facility] US on 23: The left kidney shows minimal pyelectasis to 7 mm  There is no evidence for caliectasis or hydronephrosis  Recommend a  US at least 67 hrs after birth            Positive GBS test    Overview     Will need [facility] during labor              Patient evaluated with Dr Augustine Mcginnis MD  5/8/2023  8:05 AM

## 2023-05-12 ENCOUNTER — ULTRASOUND (OUTPATIENT)
Facility: HOSPITAL | Age: 33
End: 2023-05-12

## 2023-05-12 VITALS
BODY MASS INDEX: 39.25 KG/M2 | DIASTOLIC BLOOD PRESSURE: 68 MMHG | WEIGHT: 235.6 LBS | SYSTOLIC BLOOD PRESSURE: 118 MMHG | HEART RATE: 89 BPM | HEIGHT: 65 IN

## 2023-05-12 DIAGNOSIS — O36.60X0 EXCESSIVE FETAL GROWTH, ANTEPARTUM, SINGLE OR UNSPECIFIED FETUS: ICD-10-CM

## 2023-05-12 DIAGNOSIS — O35.EXX0 PYELECTASIS OF FETUS ON PRENATAL ULTRASOUND: ICD-10-CM

## 2023-05-12 DIAGNOSIS — Z3A.37 37 WEEKS GESTATION OF PREGNANCY: Primary | ICD-10-CM

## 2023-05-12 NOTE — LETTER
May 15, 2023     Digna Otero DO  [address]    Patient: Mel King   YOB: 1990   Date of Visit: 5/12/2023       Dear Dr Jamar Porras: Thank you for referring Michelene Romberg to me for evaluation  Below are my notes for this consultation  If you have questions, please do not hesitate to call me  I look forward to following your patient along with you  Sincerely,        Maria Isabel Gifford MD        CC: No Recipients  Maria Isabel Gifford MD  5/15/2023  3:22 PM  Sign when Signing Visit  A fetal ultrasound was completed  See Ob procedures in Epic for an interpretation and recommendations  Do not hesitate to contact us in [facility] if you have questions  Mary Li MD, [address] [facility]  Maternal Fetal Medicine

## 2023-05-15 ENCOUNTER — ROUTINE PRENATAL (OUTPATIENT)
Dept: OBGYN CLINIC | Facility: CLINIC | Age: 33
End: 2023-05-15

## 2023-05-15 VITALS
DIASTOLIC BLOOD PRESSURE: 80 MMHG | HEIGHT: 65 IN | HEART RATE: 84 BPM | WEIGHT: 240 LBS | SYSTOLIC BLOOD PRESSURE: 125 MMHG | BODY MASS INDEX: 39.99 KG/M2

## 2023-05-15 DIAGNOSIS — Z3A.38 38 WEEKS GESTATION OF PREGNANCY: Primary | ICD-10-CM

## 2023-05-15 DIAGNOSIS — Z34.93 PRENATAL CARE IN THIRD TRIMESTER: ICD-10-CM

## 2023-05-15 PROBLEM — O36.60X0 ANTEPARTUM EXCESSIVE FETAL GROWTH: Status: ACTIVE | Noted: 2023-05-15

## 2023-05-15 NOTE — PROGRESS NOTES
David Lujan presents today for routine OB visit at 38w2d  She is a   Blood Pressure: 125/80  Ab=466 kg (240 lb); Body mass index is 39 94 kg/m² ; TWG=31 8 kg (70 lb)  Fetal Heart Rate: 148; Fundal Height (cm): 38 cm  SVE today:   declines   Abdomen: gravid, soft, non-tender  She reports fatigue  Denies uterine contractions  Denies vaginal bleeding or leaking of fluid  Reports adequate fetal movement of at least 10 movements in 2 hours once daily  Patient had a repeat growth scan with MFM on Friday, report is not yet published  Patient states baby is still measuring on the larger size but it was not recommended to induce based on size  We discussed option for IOL at 39 weeks and she declines at this time, she is hoping to wait for labor on her own  Reviewed labor precautions and fetal kick counts as well as pre-eclampsia warning signs  Reviewed perineal massage for decreasing risk of perineal lacerations during delivery  Advised to continue medications and return in 1 week  Current Outpatient Medications   Medication Instructions   • Prenatal Vit-Fe Fumarate-FA (PRENATAL PO) Oral     Laboratory workup: initial OB labs (WNL); 28 week labs (complete  ); 36 week cultures (collect @36w)    Vaccinations: influenza (given 22); Tdap (given 3/16/23), Rhogam (given 3/16/23)    Postpartum contraception: desires unsure, possibly depo     Fetal Ultrasounds:  22 12w5d: Normal growth  Normal nuchal translucency  23 20w5d: Vertex  Anterior low lying placenta  Normal growth  No evidence of anomalies  3/7/23 28w3d: Vertex  Anterior placenta  EFW 81%  Low lying placenta is resolved  Follow up as clinically indicated  23 34w4d: Vertex  Anterior placenta  EFW 71%  Recommend a  ultrasound for the fetal kidneys to rule out urinary reflux   Recommend a follow-up ultrasound in 3 to 4 weeks for a better estimate of the baby's weight closer to her due date since the fetal abdomen is in the 95th percentile  5/15/23 [facility] f/u US      Pregnancy Problems (from 10/27/22 to present)     Problem Noted Resolved    Positive GBS test 2023 by Fahad Boggs MD No    Overview Signed 2023  7:43 AM by Fahad Boggs MD     Will need [facility] during labor         Pyelectasis of fetus on prenatal ultrasound 2023 by Domitila Riggins MD No    Overview Addendum 2023  7:51 AM by Christina Coronel MD     Per [facility] US on 23: The left kidney shows minimal pyelectasis to 7 mm  There is no evidence for caliectasis or hydronephrosis  Recommend a  US at least 67 hrs after birth            Rh negative status during pregnancy 2023 by Dheeraj Jean MD No    Overview Addendum 2023  9:20 PM by Fahad Boggs MD     s/p rhogam at 29 weeks         38 weeks gestation of pregnancy 2022 by TIMO Harrison No    Overview Addendum 2023  8:05 AM by Fahad Boggs MD     Doing well today, N/V improved  - Continue prenatal vitamin  - Prenatal labs: wnl  - Initial OB U/S: wnl  - Genetic screening: wnl  - MSAFP ordered, not done in time  - Ultrasound 21 EFW 71%ile, AC 95%ile, DOUGLAS 20 with LVP 7 9, f/u US scheduled 23  - 28 week labs wnl  - Tdap given at 29w  - Rhogam given at 29w  - Delivery consent signed 3/16/23  - Desire Epidural  - Breastfeeding   - Follow up in 2 weeks  - Contraception plan:  depo provera  - Return to clinic in 1 week             Gastroesophageal reflux in pregnancy 2022 by Dheeraj Jean MD No    Melasma of pregnancy 2022 by Dheeraj Jean MD No

## 2023-05-15 NOTE — PROGRESS NOTES
A fetal ultrasound was completed  See Ob procedures in Epic for an interpretation and recommendations  Do not hesitate to contact us in [facility] if you have questions  Hansel Blankenship MD, [address] [facility]  Maternal Fetal Medicine

## 2023-05-19 ENCOUNTER — HOSPITAL ENCOUNTER (INPATIENT)
Facility: HOSPITAL | Age: 33
LOS: 4 days | Discharge: HOME | End: 2023-05-23
Attending: OBSTETRICS & GYNECOLOGY | Admitting: OBSTETRICS & GYNECOLOGY

## 2023-05-19 ENCOUNTER — ANESTHESIA EVENT (INPATIENT)
Dept: LABOR AND DELIVERY | Facility: HOSPITAL | Age: 33
End: 2023-05-19

## 2023-05-19 ENCOUNTER — ANESTHESIA (INPATIENT)
Dept: LABOR AND DELIVERY | Facility: HOSPITAL | Age: 33
End: 2023-05-19

## 2023-05-19 DIAGNOSIS — Z3A.38 38 WEEKS GESTATION OF PREGNANCY: ICD-10-CM

## 2023-05-19 DIAGNOSIS — Z98.891 STATUS POST PRIMARY LOW TRANSVERSE CESAREAN SECTION: Primary | ICD-10-CM

## 2023-05-19 PROBLEM — Z34.90 ENCOUNTER FOR INDUCTION OF LABOR: Status: ACTIVE | Noted: 2023-05-19

## 2023-05-19 LAB
ABO GROUP BLD: NORMAL
AMPHETAMINES SERPL QL SCN: NEGATIVE
BARBITURATES UR QL: NEGATIVE
BENZODIAZ UR QL: NEGATIVE
BLD GP AB SCN SERPL QL: POSITIVE
BLOOD GROUP ANTIBODIES SERPL: NORMAL
COCAINE UR QL: NEGATIVE
ERYTHROCYTE [DISTWIDTH] IN BLOOD BY AUTOMATED COUNT: 14.3 % (ref 11.6–15.1)
HCT VFR BLD AUTO: 34.1 % (ref 34.8–46.1)
HGB BLD-MCNC: 10.8 G/DL (ref 11.5–15.4)
HOLD SPECIMEN: NORMAL
MCH RBC QN AUTO: 25.8 PG (ref 26.8–34.3)
MCHC RBC AUTO-ENTMCNC: 31.7 G/DL (ref 31.4–37.4)
MCV RBC AUTO: 81 FL (ref 82–98)
METHADONE UR QL: NEGATIVE
OPIATES UR QL SCN: NEGATIVE
OXYCODONE+OXYMORPHONE UR QL SCN: NEGATIVE
PCP UR QL: NEGATIVE
PLATELET # BLD AUTO: 237 THOUSANDS/UL (ref 149–390)
PMV BLD AUTO: 11.6 FL (ref 8.9–12.7)
RBC # BLD AUTO: 4.19 MILLION/UL (ref 3.81–5.12)
RH BLD: NEGATIVE
SPECIMEN EXPIRATION DATE: NORMAL
THC UR QL: NEGATIVE
TREPONEMA PALLIDUM IGG+IGM AB [PRESENCE] IN SERUM OR PLASMA BY IMMUNOASSAY: NORMAL
WBC # BLD AUTO: 11.52 THOUSAND/UL (ref 4.31–10.16)

## 2023-05-19 PROCEDURE — 10H07YZ INSERTION OF OTHER DEVICE INTO PRODUCTS OF CONCEPTION, VIA NATURAL OR ARTIFICIAL OPENING: ICD-10-PCS | Performed by: STUDENT IN AN ORGANIZED HEALTH CARE EDUCATION/TRAINING PROGRAM

## 2023-05-19 PROCEDURE — 3E033VJ INTRODUCTION OF OTHER HORMONE INTO PERIPHERAL VEIN, PERCUTANEOUS APPROACH: ICD-10-PCS | Performed by: STUDENT IN AN ORGANIZED HEALTH CARE EDUCATION/TRAINING PROGRAM

## 2023-05-19 PROCEDURE — 4A1HXCZ MONITORING OF PRODUCTS OF CONCEPTION, CARDIAC RATE, EXTERNAL APPROACH: ICD-10-PCS | Performed by: STUDENT IN AN ORGANIZED HEALTH CARE EDUCATION/TRAINING PROGRAM

## 2023-05-19 RX ORDER — BUPIVACAINE HYDROCHLORIDE 2.5 MG/ML
30 INJECTION, SOLUTION EPIDURAL; INFILTRATION; INTRACAUDAL ONCE AS NEEDED
Status: DISCONTINUED | OUTPATIENT
Start: 2023-05-19 | End: 2023-05-20

## 2023-05-19 RX ORDER — ONDANSETRON 2 MG/ML
4 INJECTION INTRAMUSCULAR; INTRAVENOUS EVERY 4 HOURS PRN
Status: DISCONTINUED | OUTPATIENT
Start: 2023-05-19 | End: 2023-05-20 | Stop reason: ALTCHOICE

## 2023-05-19 RX ORDER — OXYTOCIN/RINGER'S LACTATE 30/500 ML
1-30 PLASTIC BAG, INJECTION (ML) INTRAVENOUS
Status: DISCONTINUED | OUTPATIENT
Start: 2023-05-19 | End: 2023-05-20

## 2023-05-19 RX ORDER — SODIUM CHLORIDE, SODIUM LACTATE, POTASSIUM CHLORIDE, CALCIUM CHLORIDE 600; 310; 30; 20 MG/100ML; MG/100ML; MG/100ML; MG/100ML
125 INJECTION, SOLUTION INTRAVENOUS CONTINUOUS
Status: DISCONTINUED | OUTPATIENT
Start: 2023-05-19 | End: 2023-05-20

## 2023-05-19 RX ORDER — LIDOCAINE HYDROCHLORIDE AND EPINEPHRINE 15; 5 MG/ML; UG/ML
INJECTION, SOLUTION EPIDURAL AS NEEDED
Status: DISCONTINUED | OUTPATIENT
Start: 2023-05-19 | End: 2023-05-20

## 2023-05-19 RX ORDER — ROPIVACAINE HYDROCHLORIDE 2 MG/ML
INJECTION, SOLUTION EPIDURAL; INFILTRATION; PERINEURAL CONTINUOUS PRN
Status: DISCONTINUED | OUTPATIENT
Start: 2023-05-19 | End: 2023-05-20

## 2023-05-19 RX ADMIN — ROPIVACAINE HYDROCHLORIDE 10 ML/HR: 2 INJECTION, SOLUTION EPIDURAL; INFILTRATION at 20:00

## 2023-05-19 RX ADMIN — Medication 2 MILLI-UNITS/MIN: at 20:38

## 2023-05-19 RX ADMIN — LIDOCAINE HYDROCHLORIDE AND EPINEPHRINE 5 ML: 15; 5 INJECTION, SOLUTION EPIDURAL at 19:59

## 2023-05-19 RX ADMIN — PENICILLIN G POTASSIUM 5 MILLION UNITS: 20000000 INJECTION, POWDER, FOR SOLUTION INTRAVENOUS at 17:14

## 2023-05-19 RX ADMIN — PENICILLIN G POTASSIUM 2.5 MILLION UNITS: 20000000 INJECTION, POWDER, FOR SOLUTION INTRAVENOUS at 21:11

## 2023-05-19 RX ADMIN — SODIUM CHLORIDE, SODIUM LACTATE, POTASSIUM CHLORIDE, AND CALCIUM CHLORIDE 125 ML/HR: .6; .31; .03; .02 INJECTION, SOLUTION INTRAVENOUS at 17:13

## 2023-05-19 RX ADMIN — SODIUM CHLORIDE, SODIUM LACTATE, POTASSIUM CHLORIDE, AND CALCIUM CHLORIDE 125 ML/HR: .6; .31; .03; .02 INJECTION, SOLUTION INTRAVENOUS at 23:05

## 2023-05-19 RX ADMIN — ROPIVACAINE HYDROCHLORIDE: 2 INJECTION, SOLUTION EPIDURAL; INFILTRATION at 20:14

## 2023-05-19 NOTE — PLAN OF CARE
Problem: PAIN - ADULT  Goal: Verbalizes/displays adequate comfort level or baseline comfort level  Description: Interventions:  - Encourage patient to monitor pain and request assistance  - Assess pain using appropriate pain scale  - Administer analgesics based on type and severity of pain and evaluate response  - Implement non-pharmacological measures as appropriate and evaluate response  - Consider cultural and social influences on pain and pain management  - Notify physician/advanced practitioner if interventions unsuccessful or patient reports new pain  Outcome: Progressing     Problem: INFECTION - ADULT  Goal: Absence or prevention of progression during hospitalization  Description: INTERVENTIONS:  - Assess and monitor for signs and symptoms of infection  - Monitor lab/diagnostic results  - Monitor all insertion sites, i e  indwelling lines, tubes, and drains  - Monitor endotracheal if appropriate and nasal secretions for changes in amount and color  - [place] appropriate cooling/warming therapies per order  - Administer medications as ordered  - Instruct and encourage patient and family to use good hand hygiene technique  - Identify and instruct in appropriate isolation precautions for identified infection/condition  Outcome: Progressing  Goal: Absence of fever/infection during neutropenic period  Description: INTERVENTIONS:  - Monitor WBC    Outcome: Progressing     Problem: SAFETY ADULT  Goal: Patient will remain free of falls  Description: INTERVENTIONS:  - Educate patient/family on patient safety including physical limitations  - Instruct patient to call for assistance with activity   - Consult OT/PT to assist with strengthening/mobility   - Keep Call bell within reach  - Keep bed low and locked with side rails adjusted as appropriate  - Keep care items and personal belongings within reach  - Initiate and maintain comfort rounds  - Apply yellow socks and bracelet for high fall risk patients  - Consider moving patient to room near nurses station  Outcome: Progressing  Goal: Maintain or return to baseline ADL function  Description: INTERVENTIONS:  -  Assess patient's ability to carry out ADLs; assess patient's baseline for ADL function and identify physical deficits which impact ability to perform ADLs (bathing, care of mouth/teeth, toileting, grooming, dressing, etc )  - Assess/evaluate cause of self-care deficits   - Assess range of motion  - Assess patient's mobility; develop plan if impaired  - Assess patient's need for assistive devices and provide as appropriate  - Encourage maximum independence but intervene and supervise when necessary  - Involve family in performance of ADLs  - Assess for home care needs following discharge   - Consider OT consult to assist with ADL evaluation and planning for discharge  - Provide patient education as appropriate  Outcome: Progressing  Goal: Maintains/Returns to pre admission functional level  Description: INTERVENTIONS:  - Perform BMAT or MOVE assessment daily    - Set and communicate daily mobility goal to care team and patient/family/caregiver  - Collaborate with rehabilitation services on mobility goals if consulted  - Out of bed for toileting  - Record patient progress and toleration of activity level   Outcome: Progressing     Problem: Knowledge Deficit  Goal: Patient/family/caregiver demonstrates understanding of disease process, treatment plan, medications, and discharge instructions  Description: Complete learning assessment and assess knowledge base    Interventions:  - Provide teaching at level of understanding  - Provide teaching via preferred learning methods  Outcome: Progressing     Problem: DISCHARGE PLANNING  Goal: Discharge to home or other facility with appropriate resources  Description: INTERVENTIONS:  - Identify barriers to discharge w/patient and caregiver  - Arrange for needed discharge resources and transportation as appropriate  - Identify discharge learning needs (meds, wound care, etc )  - Arrange for interpretive services to assist at discharge as needed  - Refer to Case Management Department for coordinating discharge planning if the patient needs post-hospital services based on physician/advanced practitioner order or complex needs related to functional status, cognitive ability, or social support system  Outcome: Progressing     Problem: Labor & Delivery  Goal: Manages discomfort  Description: Assess and monitor for signs and symptoms of discomfort  Assess patient's pain level regularly and per hospital policy  Administer medications as ordered  Support use of nonpharmacological methods to help control pain such as distraction, imagery, relaxation, and application of heat and cold  Collaborate with interdisciplinary team and patient to determine appropriate pain management plan  1  Include patient in decisions related to comfort  2  Offer non-pharmacological pain management interventions  3  Report ineffective pain management to physician  Outcome: Progressing  Goal: Patient vital signs are stable  Description: 1  Assess vital signs - vaginal delivery    Outcome: Progressing

## 2023-05-19 NOTE — ANESTHESIA PROCEDURE NOTES
Epidural Block    Start time: 5/19/2023 7:58 PM  Reason for block: procedure for pain and at surgeon's request  Staffing  Performed: CRNA   Anesthesiologist: Tala Raymond MD  Resident/CRNA: Gerhardt Hunger, CRNA  Preanesthetic Checklist  Completed: patient identified, IV checked, site marked, risks and benefits discussed, surgical consent, monitors and equipment checked, pre-op evaluation and timeout performed  Epidural  Patient position: sitting  Prep: ChloraPrep  Patient monitoring: cardiac monitor and frequent blood pressure checks  Approach: midline  Location: lumbar  Injection technique: ELAINE air  Needle  Needle type: Tuohy   Needle gauge: 18 G  Catheter type: side hole  Catheter size: 20 G  Catheter at skin depth: 10 cm  Catheter securement method: stabilization device  Test dose: negative  Assessment  Number of attempts: 1negative aspiration for CSF, negative aspiration for heme and no paresthesia on injection  patient tolerated the procedure well with no immediate complications

## 2023-05-19 NOTE — ANESTHESIA PREPROCEDURE EVALUATION
Procedure:  LABOR ANALGESIA    Relevant Problems   GI/HEPATIC   (+) Gastroesophageal reflux in pregnancy      GYN   (+) 37 weeks gestation of pregnancy      NEURO/PSYCH   (+) Anxiety        Physical Exam    Airway    Mallampati score: I  TM Distance: >3 FB  Neck ROM: full     Dental   No notable dental hx     Cardiovascular      Pulmonary      Other Findings        Anesthesia Plan  ASA Score- 2     Anesthesia Type- epidural with ASA Monitors  Additional Monitors:   Airway Plan:           Plan Factors-Exercise tolerance (METS): >4 METS  Chart reviewed  Existing labs reviewed  Patient summary reviewed  Induction-     Postoperative Plan-     Informed Consent- Anesthetic plan and risks discussed with patient  I personally reviewed this patient with the CRNA  Discussed and agreed on the Anesthesia Plan with the CRNA  Adriel Walsh

## 2023-05-19 NOTE — ASSESSMENT & PLAN NOTE
Admit to L&D  CBC, RPR, type and screen   IVF   Induce with: pitocin   Penicillin for GBS positive status   Clear liquid diet  Analgesics at maternal request   SVE 4/70/-2

## 2023-05-19 NOTE — OB LABOR/OXYTOCIN SAFETY PROGRESS
Labor Progress Note - Augustin Mora 27 y o  female MRN: [id number]    Unit/Bed#: -01 Encounter: [id number]       Contraction Frequency (minutes): 2-6  Contraction Quality: Mild  Tachysystole: No   Cervical Dilation: 4  Cervical Effacement: 70  Fetal Station: -2  Baseline Rate: 120 bpm  Fetal Heart Rate: 147 BPM  FHR Category: Category I      Vital Signs:   Vitals:    05/19/23 1610   BP: 125/87   Pulse: 101   Resp: 18   Temp: 98 5 °F (36 9 °C)   SpO2:        Notes/comments: Emma Brown believes her contractions are getting stronger  No interval change in cervical dilation noted  Patient agreeable to induction with Pitocin and consent was signed  Fetal heart tracing is currently category 1 with contractions 2 to 6 minutes apart  Will discuss with Dr Nancy Kirkpatrick MD 5/19/2023 7:27 PM

## 2023-05-19 NOTE — H&P
H&P Exam - Obstetrics   Shira Fernandez 27 y o  female MRN: [id number]  Unit/Bed#: LD TRIAGE 2 Encounter: [id number]      ASSESSMENT:  27 y  o yo  at 38w6d weeks gestation who is being admitted for SROM   EFW: 93%  VTX by transabdominal ultrasound     PLAN:    Encounter for induction of labor  Assessment & Plan  Admit to L&D  CBC, RPR, type and screen   IVF   Induce with: pitocin   Penicillin for GBS positive status   Clear liquid diet  Analgesics at maternal request   SVE /-2    Positive GBS test  Assessment & Plan  IV Penicillin       Discussed with Dr Rashmi Montgomery    This patient will be an INPATIENT  and I certify the anticipated length of stay is >2 Midnights  History of Present Illness     Chief Complaint: SROM    HPI:  Shira Fernandez is a 27 y o   female with an BETINA of 2023, by Last Menstrual Period at 38w6d weeks gestation who is being admitted for SROM at 1425 the afternoon of admission  She was sitting eating lunch and she felt a gush of fluid  She stood up to attempt to use the restroom and felt another gush  She denies contractions, VB, and baby is moving her normal amount  SVE on admission was /-2  Contractions: none  Loss of fluid: yes  Vaginal bleeding: none  Fetal movement: yes     She is [facility]B patient  PREGNANCY COMPLICATIONS:   1) Rh Negative   2) GBS negative     OB History    Para Term  AB Living   1             SAB IAB Ectopic Multiple Live Births                  # Outcome Date GA Lbr Miguel/2nd Weight Sex Delivery Anes PTL Lv   1 Current                Baby complications/comments: none    Review of Systems   Constitutional: Negative for chills and fever  Eyes: Negative for visual disturbance  Respiratory: Negative for cough and shortness of breath  Cardiovascular: Negative for chest pain and palpitations  Gastrointestinal: Negative for abdominal pain and vomiting     Genitourinary: Negative for dysuria, hematuria, vaginal bleeding and vaginal discharge  LOF   Musculoskeletal: Negative for arthralgias and back pain  Skin: Negative for color change and rash  Neurological: Negative for dizziness and headaches  All other systems reviewed and are negative  Historical Information   No past medical history on file  Past Surgical History:   Procedure Laterality Date   • COLONOSCOPY     • TONSILLECTOMY     • UPPER GASTROINTESTINAL ENDOSCOPY     • WISDOM TOOTH EXTRACTION       Social History   Social History     Substance and Sexual Activity   Alcohol Use Not Currently   • Alcohol/week: 7 0 standard drinks   • Types: 7 Glasses of wine per week     Social History     Substance and Sexual Activity   Drug Use Not Currently   • Types: Marijuana     Social History     Tobacco Use   Smoking Status Former   • Types: Cigarettes   • Quit date: 2022   • Years since quittin 7   Smokeless Tobacco Never     Family History: non-contributory    Meds/Allergies      Medications Prior to Admission   Medication   • Prenatal Vit-Fe Fumarate-FA (PRENATAL PO)      No Known Allergies    OBJECTIVE:    Vitals: Blood pressure 125/87, pulse 101, temperature 98 5 °F (36 9 °C), temperature source Oral, resp  rate 18, last menstrual period 2022, SpO2 97 %  There is no height or weight on file to calculate BMI  Physical Exam  Exam conducted with a chaperone present  Constitutional:       General: She is not in acute distress  Appearance: She is not toxic-appearing  HENT:      Head: Normocephalic and atraumatic  Nose: Nose normal       Mouth/Throat:      Mouth: Mucous membranes are moist    Eyes:      Extraocular Movements: Extraocular movements intact  Pupils: Pupils are equal, round, and reactive to light  Cardiovascular:      Rate and Rhythm: Normal rate  Pulmonary:      Effort: Pulmonary effort is normal    Genitourinary:     General: Normal vulva  Vagina: No vaginal discharge        Rectum: Normal  Comments: Pooling noted  Musculoskeletal:         General: No swelling  Cervical back: Neck supple  Right lower leg: No edema  Left lower leg: No edema  Skin:     General: Skin is warm and dry  Neurological:      General: No focal deficit present  Mental Status: She is alert and oriented to person, place, and time     Psychiatric:         Mood and Affect: Mood normal          Behavior: Behavior normal            [place]: yes   Nitrazine: positive     Cervix:   4/70/-2    Fetal heart rate:   Baseline Rate: 135 bpm  Variability: Moderate 6-25 bpm  Accelerations: 15 x 15 or greater, With fetal movment, At variable times  Decelerations: None    [place]:   Contraction Frequency (minutes): 4-6  Contraction Duration (seconds): 40-50  Contraction Quality: Mild    GBS: positive     Prenatal Labs:   Blood Type:   Lab Results   Component Value Date/Time    ABO Grouping O 02/24/2023 02:07 PM     , D (Rh type):   Lab Results   Component Value Date/Time    Rh Factor Negative 02/24/2023 02:07 PM     , Antibody Screen: No results found for: ANTIBODYSCR , HCT/HGB:   Lab Results   Component Value Date/Time    Hematocrit 35 5 02/24/2023 02:07 PM    Hemoglobin 11 4 (L) 02/24/2023 02:07 PM      , MCV:   Lab Results   Component Value Date/Time    MCV 86 02/24/2023 02:07 PM      , Platelets:   Lab Results   Component Value Date/Time    Platelets 112 07/23/5475 02:07 PM      , 1 hour Glucola:   Lab Results   Component Value Date/Time    Glucose 111 02/24/2023 02:07 PM   , 3 hour GTT: No results found for: RZNCPEK0IQ, Varicella: No results found for: VARICELLAIGG    , Rubella:   Lab Results   Component Value Date/Time    Rubella IgG Quant 148 8 11/07/2022 02:56 PM        , VDRL/RPR:   Lab Results   Component Value Date/Time    RPR Non-Reactive 11/07/2022 02:56 PM      , Urine Culture/Screen:   Lab Results   Component Value Date/Time    Urine Culture 20,000-29,000 cfu/ml 11/07/2022 02:56 PM       , Urine Drug Screen: No results found for: Veleria Barreto, BDZUR, THCUR, COCAINEUR, METHADONEUR, OPIATEUR, PCPUR, MTHAMUR, ECSTASYUR, TRICYCLICSUR, Hep B:   Lab Results   Component Value Date/Time    Hepatitis B Surface Ag Non-reactive 11/07/2022 02:56 PM     , Hep C: No components found for: HEPCSAG, EXTHEPCSAG   , HIV:   Lab Results   Component Value Date/Time    HIV-1/HIV-2 Ab Non-Reactive 11/07/2022 02:56 PM     , Chlamydia: No results found for: EXTCHLAMYDIA  , Gonorrhea:   Lab Results   Component Value Date/Time    N gonorrhoeae, DNA Probe Negative 12/01/2022 09:09 AM     , Group B Strep:    Lab Results   Component Value Date/Time    Strep Grp B PCR Positive (A) 05/01/2023 10:46 AM          Invasive Devices     None                 Shaniqua Corcoran DO    5/19/2023  4:39 PM

## 2023-05-20 PROBLEM — Z98.891 STATUS POST PRIMARY LOW TRANSVERSE CESAREAN SECTION: Status: ACTIVE | Noted: 2023-05-20

## 2023-05-20 LAB
ALBUMIN SERPL BCP-MCNC: 3.3 G/DL (ref 3.5–5)
ALP SERPL-CCNC: 121 U/L (ref 34–104)
ALT SERPL W P-5'-P-CCNC: 14 U/L (ref 7–52)
ANION GAP SERPL CALCULATED.3IONS-SCNC: 9 MMOL/L (ref 4–13)
AST SERPL W P-5'-P-CCNC: 18 U/L (ref 13–39)
BASE EXCESS BLDCOV CALC-SCNC: -4.3 MMOL/L (ref 1–9)
BILIRUB SERPL-MCNC: 0.24 MG/DL (ref 0.2–1)
BUN SERPL-MCNC: 10 MG/DL (ref 5–25)
CALCIUM ALBUM COR SERPL-MCNC: 9.3 MG/DL (ref 8.3–10.1)
CALCIUM SERPL-MCNC: 8.7 MG/DL (ref 8.4–10.2)
CHLORIDE SERPL-SCNC: 105 MMOL/L (ref 96–108)
CO2 SERPL-SCNC: 22 MMOL/L (ref 21–32)
CREAT SERPL-MCNC: 0.55 MG/DL (ref 0.6–1.3)
CREAT UR-MCNC: 265.5 MG/DL
ERYTHROCYTE [DISTWIDTH] IN BLOOD BY AUTOMATED COUNT: 14.5 % (ref 11.6–15.1)
GFR SERPL CREATININE-BSD FRML MDRD: 122 ML/MIN/1.73SQ M
GLUCOSE SERPL-MCNC: 89 MG/DL (ref 65–140)
HCO3 BLDCOV-SCNC: 20.4 MMOL/L (ref 12.2–28.6)
HCT VFR BLD AUTO: 33 % (ref 34.8–46.1)
HGB BLD-MCNC: 10.4 G/DL (ref 11.5–15.4)
MCH RBC QN AUTO: 26.1 PG (ref 26.8–34.3)
MCHC RBC AUTO-ENTMCNC: 31.5 G/DL (ref 31.4–37.4)
MCV RBC AUTO: 83 FL (ref 82–98)
OXYHGB MFR BLDCOV: 50.4 %
PCO2 BLDCOV: 36.4 MM HG (ref 27–43)
PH BLDCOV: 7.37 [PH] (ref 7.19–7.49)
PLATELET # BLD AUTO: 189 THOUSANDS/UL (ref 149–390)
PMV BLD AUTO: 11.9 FL (ref 8.9–12.7)
PO2 BLDCOV: 22 MM HG (ref 15–45)
POTASSIUM SERPL-SCNC: 3.8 MMOL/L (ref 3.5–5.3)
PROT SERPL-MCNC: 6.1 G/DL (ref 6.4–8.4)
PROT UR-MCNC: 74 MG/DL
PROT/CREAT UR: 0.28 MG/G{CREAT} (ref 0–0.1)
RBC # BLD AUTO: 3.99 MILLION/UL (ref 3.81–5.12)
SAO2 % BLDCOV: 9.7 ML/DL
SODIUM SERPL-SCNC: 136 MMOL/L (ref 135–147)
WBC # BLD AUTO: 16.87 THOUSAND/UL (ref 4.31–10.16)

## 2023-05-20 RX ORDER — DEXAMETHASONE SODIUM PHOSPHATE 10 MG/ML
INJECTION, SOLUTION INTRAMUSCULAR; INTRAVENOUS AS NEEDED
Status: DISCONTINUED | OUTPATIENT
Start: 2023-05-20 | End: 2023-05-20

## 2023-05-20 RX ORDER — HYDROMORPHONE HCL/PF 1 MG/ML
0.25 SYRINGE (ML) INJECTION
Status: DISCONTINUED | OUTPATIENT
Start: 2023-05-20 | End: 2023-05-20

## 2023-05-20 RX ORDER — DOCUSATE SODIUM 100 MG/1
100 CAPSULE, LIQUID FILLED ORAL 2 TIMES DAILY
Status: DISCONTINUED | OUTPATIENT
Start: 2023-05-20 | End: 2023-05-23 | Stop reason: HOSPADM

## 2023-05-20 RX ORDER — SODIUM CHLORIDE, SODIUM LACTATE, POTASSIUM CHLORIDE, CALCIUM CHLORIDE 600; 310; 30; 20 MG/100ML; MG/100ML; MG/100ML; MG/100ML
INJECTION, SOLUTION INTRAVENOUS CONTINUOUS PRN
Status: DISCONTINUED | OUTPATIENT
Start: 2023-05-20 | End: 2023-05-20

## 2023-05-20 RX ORDER — OXYTOCIN/RINGER'S LACTATE 30/500 ML
62.5 PLASTIC BAG, INJECTION (ML) INTRAVENOUS ONCE
Status: COMPLETED | OUTPATIENT
Start: 2023-05-20 | End: 2023-05-21

## 2023-05-20 RX ORDER — IBUPROFEN 600 MG/1
600 TABLET ORAL EVERY 6 HOURS
Status: DISCONTINUED | OUTPATIENT
Start: 2023-05-22 | End: 2023-05-23 | Stop reason: HOSPADM

## 2023-05-20 RX ORDER — CLONIDINE 100 UG/ML
INJECTION, SOLUTION EPIDURAL AS NEEDED
Status: DISCONTINUED | OUTPATIENT
Start: 2023-05-20 | End: 2023-05-20

## 2023-05-20 RX ORDER — MORPHINE SULFATE 0.5 MG/ML
INJECTION, SOLUTION EPIDURAL; INTRATHECAL; INTRAVENOUS AS NEEDED
Status: DISCONTINUED | OUTPATIENT
Start: 2023-05-20 | End: 2023-05-20

## 2023-05-20 RX ORDER — CALCIUM CARBONATE 500 MG/1
1000 TABLET, CHEWABLE ORAL DAILY PRN
Status: DISCONTINUED | OUTPATIENT
Start: 2023-05-20 | End: 2023-05-23 | Stop reason: HOSPADM

## 2023-05-20 RX ORDER — OXYTOCIN/RINGER'S LACTATE 30/500 ML
PLASTIC BAG, INJECTION (ML) INTRAVENOUS CONTINUOUS PRN
Status: DISCONTINUED | OUTPATIENT
Start: 2023-05-20 | End: 2023-05-20

## 2023-05-20 RX ORDER — TRANEXAMIC ACID 10 MG/ML
INJECTION, SOLUTION INTRAVENOUS AS NEEDED
Status: DISCONTINUED | OUTPATIENT
Start: 2023-05-20 | End: 2023-05-20

## 2023-05-20 RX ORDER — HYDROMORPHONE HCL/PF 1 MG/ML
0.5 SYRINGE (ML) INJECTION EVERY 2 HOUR PRN
Status: DISCONTINUED | OUTPATIENT
Start: 2023-05-20 | End: 2023-05-23 | Stop reason: HOSPADM

## 2023-05-20 RX ORDER — KETOROLAC TROMETHAMINE 30 MG/ML
30 INJECTION, SOLUTION INTRAMUSCULAR; INTRAVENOUS EVERY 6 HOURS SCHEDULED
Status: DISPENSED | OUTPATIENT
Start: 2023-05-21 | End: 2023-05-22

## 2023-05-20 RX ORDER — BUPIVACAINE HYDROCHLORIDE 2.5 MG/ML
INJECTION, SOLUTION EPIDURAL; INFILTRATION; INTRACAUDAL AS NEEDED
Status: DISCONTINUED | OUTPATIENT
Start: 2023-05-20 | End: 2023-05-20

## 2023-05-20 RX ORDER — NALBUPHINE HYDROCHLORIDE 10 MG/ML
10 INJECTION, SOLUTION INTRAMUSCULAR; INTRAVENOUS; SUBCUTANEOUS EVERY 4 HOURS PRN
Status: DISCONTINUED | OUTPATIENT
Start: 2023-05-20 | End: 2023-05-23 | Stop reason: HOSPADM

## 2023-05-20 RX ORDER — ROPIVACAINE HYDROCHLORIDE 2 MG/ML
INJECTION, SOLUTION EPIDURAL; INFILTRATION; PERINEURAL AS NEEDED
Status: DISCONTINUED | OUTPATIENT
Start: 2023-05-20 | End: 2023-05-20

## 2023-05-20 RX ORDER — ONDANSETRON 2 MG/ML
4 INJECTION INTRAMUSCULAR; INTRAVENOUS EVERY 4 HOURS PRN
Status: DISCONTINUED | OUTPATIENT
Start: 2023-05-20 | End: 2023-05-20

## 2023-05-20 RX ORDER — DIPHENHYDRAMINE HYDROCHLORIDE 50 MG/ML
12.5 INJECTION INTRAMUSCULAR; INTRAVENOUS ONCE AS NEEDED
Status: DISCONTINUED | OUTPATIENT
Start: 2023-05-20 | End: 2023-05-20

## 2023-05-20 RX ORDER — KETOROLAC TROMETHAMINE 30 MG/ML
INJECTION, SOLUTION INTRAMUSCULAR; INTRAVENOUS AS NEEDED
Status: DISCONTINUED | OUTPATIENT
Start: 2023-05-20 | End: 2023-05-20

## 2023-05-20 RX ORDER — CEFAZOLIN SODIUM 2 G/50ML
2000 SOLUTION INTRAVENOUS ONCE
Status: COMPLETED | OUTPATIENT
Start: 2023-05-20 | End: 2023-05-20

## 2023-05-20 RX ORDER — FENTANYL CITRATE/PF 50 MCG/ML
25 SYRINGE (ML) INJECTION
Status: DISCONTINUED | OUTPATIENT
Start: 2023-05-20 | End: 2023-05-20

## 2023-05-20 RX ORDER — ACETAMINOPHEN 325 MG/1
975 TABLET ORAL EVERY 6 HOURS PRN
Status: DISCONTINUED | OUTPATIENT
Start: 2023-05-20 | End: 2023-05-20

## 2023-05-20 RX ORDER — ONDANSETRON 2 MG/ML
INJECTION INTRAMUSCULAR; INTRAVENOUS AS NEEDED
Status: DISCONTINUED | OUTPATIENT
Start: 2023-05-20 | End: 2023-05-20

## 2023-05-20 RX ORDER — ACETAMINOPHEN 325 MG/1
975 TABLET ORAL EVERY 6 HOURS
Status: DISCONTINUED | OUTPATIENT
Start: 2023-05-20 | End: 2023-05-23 | Stop reason: HOSPADM

## 2023-05-20 RX ORDER — LIDOCAINE HYDROCHLORIDE AND EPINEPHRINE 20; 5 MG/ML; UG/ML
INJECTION, SOLUTION EPIDURAL; INFILTRATION; INTRACAUDAL; PERINEURAL AS NEEDED
Status: DISCONTINUED | OUTPATIENT
Start: 2023-05-20 | End: 2023-05-20

## 2023-05-20 RX ORDER — SODIUM CHLORIDE, SODIUM LACTATE, POTASSIUM CHLORIDE, CALCIUM CHLORIDE 600; 310; 30; 20 MG/100ML; MG/100ML; MG/100ML; MG/100ML
125 INJECTION, SOLUTION INTRAVENOUS CONTINUOUS
Status: DISCONTINUED | OUTPATIENT
Start: 2023-05-20 | End: 2023-05-23 | Stop reason: HOSPADM

## 2023-05-20 RX ORDER — NALOXONE HYDROCHLORIDE 0.4 MG/ML
0.1 INJECTION, SOLUTION INTRAMUSCULAR; INTRAVENOUS; SUBCUTANEOUS
Status: ACTIVE | OUTPATIENT
Start: 2023-05-20 | End: 2023-05-21

## 2023-05-20 RX ORDER — ONDANSETRON 2 MG/ML
4 INJECTION INTRAMUSCULAR; INTRAVENOUS EVERY 8 HOURS PRN
Status: DISCONTINUED | OUTPATIENT
Start: 2023-05-20 | End: 2023-05-23 | Stop reason: HOSPADM

## 2023-05-20 RX ORDER — FENTANYL CITRATE 50 UG/ML
INJECTION, SOLUTION INTRAMUSCULAR; INTRAVENOUS AS NEEDED
Status: DISCONTINUED | OUTPATIENT
Start: 2023-05-20 | End: 2023-05-20

## 2023-05-20 RX ORDER — ENOXAPARIN SODIUM 100 MG/ML
40 INJECTION SUBCUTANEOUS EVERY 12 HOURS
Status: DISCONTINUED | OUTPATIENT
Start: 2023-05-20 | End: 2023-05-21

## 2023-05-20 RX ORDER — METOCLOPRAMIDE HYDROCHLORIDE 5 MG/ML
INJECTION INTRAMUSCULAR; INTRAVENOUS AS NEEDED
Status: DISCONTINUED | OUTPATIENT
Start: 2023-05-20 | End: 2023-05-20

## 2023-05-20 RX ADMIN — BUPIVACAINE HYDROCHLORIDE 5 ML: 2.5 INJECTION, SOLUTION EPIDURAL; INFILTRATION; INTRACAUDAL; PERINEURAL at 11:30

## 2023-05-20 RX ADMIN — ROPIVACAINE HYDROCHLORIDE 7 MG: 2 INJECTION, SOLUTION EPIDURAL; INFILTRATION; PERINEURAL at 10:20

## 2023-05-20 RX ADMIN — CEFAZOLIN SODIUM 2000 MG: 2 SOLUTION INTRAVENOUS at 18:56

## 2023-05-20 RX ADMIN — SODIUM CHLORIDE, SODIUM LACTATE, POTASSIUM CHLORIDE, CALCIUM CHLORIDE: 600; 310; 30; 20 INJECTION, SOLUTION INTRAVENOUS at 18:53

## 2023-05-20 RX ADMIN — ROPIVACAINE HYDROCHLORIDE: 2 INJECTION, SOLUTION EPIDURAL; INFILTRATION at 13:40

## 2023-05-20 RX ADMIN — LIDOCAINE HYDROCHLORIDE,EPINEPHRINE BITARTRATE 10 ML: 20; .005 INJECTION, SOLUTION EPIDURAL; INFILTRATION; INTRACAUDAL; PERINEURAL at 18:53

## 2023-05-20 RX ADMIN — SODIUM CHLORIDE, SODIUM LACTATE, POTASSIUM CHLORIDE, AND CALCIUM CHLORIDE: .6; .31; .03; .02 INJECTION, SOLUTION INTRAVENOUS at 18:58

## 2023-05-20 RX ADMIN — ACETAMINOPHEN 975 MG: 325 TABLET ORAL at 06:29

## 2023-05-20 RX ADMIN — PENICILLIN G POTASSIUM 2.5 MILLION UNITS: 20000000 INJECTION, POWDER, FOR SOLUTION INTRAVENOUS at 13:43

## 2023-05-20 RX ADMIN — FENTANYL CITRATE 50 MCG: 50 INJECTION INTRAMUSCULAR; INTRAVENOUS at 19:23

## 2023-05-20 RX ADMIN — SODIUM CHLORIDE, SODIUM LACTATE, POTASSIUM CHLORIDE, AND CALCIUM CHLORIDE 125 ML/HR: .6; .31; .03; .02 INJECTION, SOLUTION INTRAVENOUS at 21:53

## 2023-05-20 RX ADMIN — ONDANSETRON 4 MG: 2 INJECTION INTRAMUSCULAR; INTRAVENOUS at 18:57

## 2023-05-20 RX ADMIN — TRANEXAMIC ACID 1000 MG: 10 INJECTION, SOLUTION INTRAVENOUS at 19:26

## 2023-05-20 RX ADMIN — ROPIVACAINE HYDROCHLORIDE: 2 INJECTION, SOLUTION EPIDURAL; INFILTRATION at 04:41

## 2023-05-20 RX ADMIN — PENICILLIN G POTASSIUM 2.5 MILLION UNITS: 20000000 INJECTION, POWDER, FOR SOLUTION INTRAVENOUS at 05:05

## 2023-05-20 RX ADMIN — PENICILLIN G POTASSIUM 2.5 MILLION UNITS: 20000000 INJECTION, POWDER, FOR SOLUTION INTRAVENOUS at 09:08

## 2023-05-20 RX ADMIN — DEXAMETHASONE SODIUM PHOSPHATE 10 MG: 10 INJECTION, SOLUTION INTRAMUSCULAR; INTRAVENOUS at 18:57

## 2023-05-20 RX ADMIN — AZITHROMYCIN MONOHYDRATE 500 MG: 500 INJECTION, POWDER, LYOPHILIZED, FOR SOLUTION INTRAVENOUS at 18:58

## 2023-05-20 RX ADMIN — SODIUM CHLORIDE, SODIUM LACTATE, POTASSIUM CHLORIDE, AND CALCIUM CHLORIDE 125 ML/HR: .6; .31; .03; .02 INJECTION, SOLUTION INTRAVENOUS at 11:45

## 2023-05-20 RX ADMIN — MORPHINE SULFATE 2.5 MG: 0.5 INJECTION, SOLUTION EPIDURAL; INTRATHECAL; INTRAVENOUS at 19:20

## 2023-05-20 RX ADMIN — PENICILLIN G POTASSIUM 2.5 MILLION UNITS: 20000000 INJECTION, POWDER, FOR SOLUTION INTRAVENOUS at 01:00

## 2023-05-20 RX ADMIN — ACETAMINOPHEN 975 MG: 325 TABLET ORAL at 20:59

## 2023-05-20 RX ADMIN — SODIUM CHLORIDE, SODIUM LACTATE, POTASSIUM CHLORIDE, AND CALCIUM CHLORIDE 999 ML/HR: .6; .31; .03; .02 INJECTION, SOLUTION INTRAVENOUS at 10:02

## 2023-05-20 RX ADMIN — Medication 62.5 MILLI-UNITS/MIN: at 20:59

## 2023-05-20 RX ADMIN — CLONIDINE 100 MCG: 100 INJECTION, SOLUTION EPIDURAL at 11:30

## 2023-05-20 RX ADMIN — PENICILLIN G POTASSIUM 2.5 MILLION UNITS: 20000000 INJECTION, POWDER, FOR SOLUTION INTRAVENOUS at 17:21

## 2023-05-20 RX ADMIN — KETOROLAC TROMETHAMINE 15 MG: 30 INJECTION, SOLUTION INTRAMUSCULAR at 19:59

## 2023-05-20 RX ADMIN — METOCLOPRAMIDE 10 MG: 5 INJECTION, SOLUTION INTRAMUSCULAR; INTRAVENOUS at 19:03

## 2023-05-20 RX ADMIN — Medication 250 MILLI-UNITS/MIN: at 19:19

## 2023-05-20 RX ADMIN — ROPIVACAINE HYDROCHLORIDE: 2 INJECTION, SOLUTION EPIDURAL; INFILTRATION at 09:17

## 2023-05-20 RX ADMIN — SODIUM CHLORIDE, SODIUM LACTATE, POTASSIUM CHLORIDE, AND CALCIUM CHLORIDE 999 ML/HR: .6; .31; .03; .02 INJECTION, SOLUTION INTRAVENOUS at 06:51

## 2023-05-20 RX ADMIN — SODIUM CHLORIDE, SODIUM LACTATE, POTASSIUM CHLORIDE, AND CALCIUM CHLORIDE 125 ML/HR: .6; .31; .03; .02 INJECTION, SOLUTION INTRAVENOUS at 17:00

## 2023-05-20 RX ADMIN — SODIUM CHLORIDE, SODIUM LACTATE, POTASSIUM CHLORIDE, AND CALCIUM CHLORIDE 925 ML/HR: .6; .31; .03; .02 INJECTION, SOLUTION INTRAVENOUS at 20:51

## 2023-05-20 RX ADMIN — ONDANSETRON 4 MG: 2 INJECTION INTRAMUSCULAR; INTRAVENOUS at 16:38

## 2023-05-20 RX ADMIN — ROPIVACAINE HYDROCHLORIDE: 2 INJECTION, SOLUTION EPIDURAL; INFILTRATION at 00:08

## 2023-05-20 RX ADMIN — LIDOCAINE HYDROCHLORIDE,EPINEPHRINE BITARTRATE 5 ML: 20; .005 INJECTION, SOLUTION EPIDURAL; INFILTRATION; INTRACAUDAL; PERINEURAL at 18:56

## 2023-05-20 NOTE — OB LABOR/OXYTOCIN SAFETY PROGRESS
Oxytocin Safety Progress Check Note - Daniella Janeen 27 y o  female MRN: [id number]    Unit/Bed#: -01 Encounter: [id number]    Dose (robbie-units/min) Oxytocin: 14 robbie-units/min  Contraction Frequency (minutes): 2-3 5  Contraction Quality: Moderate  Tachysystole: No   Cervical Dilation: 8        Cervical Effacement: 90  Fetal Station: -1  Baseline Rate: 125 bpm  Fetal Heart Rate: 123 BPM  FHR Category: Category I               Vital Signs:   Vitals:    05/20/23 0227   BP: 109/58   Pulse: 74   Resp:    Temp:    SpO2:        Notes/comments: Continue titrating pitocin, discussed with attending          Enma Mckeon MD 5/20/2023 2:41 AM

## 2023-05-20 NOTE — OB LABOR/OXYTOCIN SAFETY PROGRESS
Oxytocin Safety Progress Check Note - Grady Rebolledo 27 y o  female MRN: [id number]    Unit/Bed#: -01 Encounter: [id number]    Dose (robbie-units/min) Oxytocin: 28 robbie-units/min  Contraction Frequency (minutes): 3  Contraction Quality: Strong  Tachysystole: No   Cervical Dilation: 10  Dilation Complete Date: 23  Dilation Complete Time: 1507  Cervical Effacement: 100  Fetal Station: 1  Baseline Rate: 125 bpm  Fetal Heart Rate: 160 BPM  FHR Category: Category II               Vital Signs:   Vitals:    23 1612   BP:    Pulse:    Resp:    Temp: 98 2 °F (36 8 °C)   SpO2:        Notes/comments:   SAFETY HUDDLE:  Pt has been pushing for 2 hours without any descent despite coaching  We discussed options of turning down her epidural to allow her to direct pushing better vs proceeding w/   We also discussed the possibility of cephalopelvic disproportion  Tracing is reassuring  Pt is very uncomfortable and elects to proceed with 1ltcs  Pitcoin turned off  Risks of bleeding, infection, injury to surrounding structures were discussed  Plan for fetal pillow due to +1 station  All teams aware          Gisela Davalos MD 2023 5:29 PM

## 2023-05-20 NOTE — OB LABOR/OXYTOCIN SAFETY PROGRESS
Oxytocin Safety Progress Check Note - Metta  27 y o  female MRN: [id number]    Unit/Bed#: -01 Encounter: [id number]    Dose (robbie-units/min) Oxytocin: 24 robbie-units/min  Contraction Frequency (minutes): 2-3  Contraction Quality: Strong  Tachysystole: No   Cervical Dilation: 9        Cervical Effacement: 90  Fetal Station: 1  Baseline Rate: 140 bpm  Fetal Heart Rate: 137 BPM  FHR Category: Category II               Vital Signs:   Vitals:    05/20/23 1112   BP: 126/73   Pulse: 74   Resp:    Temp:    SpO2:        Notes/comments: SVE as above  FHT is CAT 1  Patient feeling pressure and contraction pains  Practice pushing and an attempt to push through lip of cervix  Attempted  Cervix still present  Plan to give patient an epidural bolus from anesthesia and recheck cervix  Once patient is more comfortable  Urine output is low  CBC, CMP and P:C within normal limits  Plans to give more bolus and monitor output     Discussed with Dr Kiera Mendoza MD 5/20/2023 11:34 AM

## 2023-05-20 NOTE — OB LABOR/OXYTOCIN SAFETY PROGRESS
Labor Progress Note - Page Sherman 27 y o  female MRN: [id number]    Unit/Bed#: -01 Encounter: [id number]    Dose (robbie-units/min) Oxytocin: 6 robbie-units/min  Contraction Frequency (minutes): 1-3  Contraction Quality: Mild  Tachysystole: No   Cervical Dilation: 4        Cervical Effacement: 70  Fetal Station: -2  Baseline Rate: 135 bpm  Fetal Heart Rate: 122 BPM  FHR Category: Category I               Vital Signs:   Vitals:    05/19/23 2242   BP: 98/55   Pulse: 71   Resp:    Temp: 98 °F (36 7 °C)   SpO2:        Notes/comments: Continue titrating pit  Check deferred  Discussed with attending          Evelyn Márquez MD 5/19/2023 10:47 PM

## 2023-05-20 NOTE — OB LABOR/OXYTOCIN SAFETY PROGRESS
Oxytocin Safety Progress Check Note - Elaine Daugherty 27 y o  female MRN: [id number]    Unit/Bed#: -01 Encounter: [id number]    Dose (robbie-units/min) Oxytocin: 18 robbie-units/min  Contraction Frequency (minutes): 1 5-3 5  Contraction Quality: Moderate  Tachysystole: No   Cervical Dilation: 7        Cervical Effacement: 90  Fetal Station: -1  Baseline Rate: 140 bpm  Fetal Heart Rate: 142 BPM  FHR Category: Category I               Vital Signs:   Vitals:    05/20/23 0442   BP: 115/66   Pulse: 95   Resp: 20   Temp: 99 3 °F (37 4 °C)   SpO2:        Notes/comments: MVUs inadequate, continue titrating pitocin  Discussed with attending          Prashanth Barnett MD 5/20/2023 4:56 AM

## 2023-05-20 NOTE — PLAN OF CARE
Left message for patient to call clinic back regarding Dr Raman's message.  A letter was also sent.    Griselda Mendoza CMA     Problem: PAIN - ADULT  Goal: Verbalizes/displays adequate comfort level or baseline comfort level  Description: Interventions:  - Encourage patient to monitor pain and request assistance  - Assess pain using appropriate pain scale  - Administer analgesics based on type and severity of pain and evaluate response  - Implement non-pharmacological measures as appropriate and evaluate response  - Consider cultural and social influences on pain and pain management  - Notify physician/advanced practitioner if interventions unsuccessful or patient reports new pain  Outcome: Progressing     Problem: INFECTION - ADULT  Goal: Absence or prevention of progression during hospitalization  Description: INTERVENTIONS:  - Assess and monitor for signs and symptoms of infection  - Monitor lab/diagnostic results  - Monitor all insertion sites, i e  indwelling lines, tubes, and drains  - Monitor endotracheal if appropriate and nasal secretions for changes in amount and color  - [place] appropriate cooling/warming therapies per order  - Administer medications as ordered  - Instruct and encourage patient and family to use good hand hygiene technique  - Identify and instruct in appropriate isolation precautions for identified infection/condition  Outcome: Progressing  Goal: Absence of fever/infection during neutropenic period  Description: INTERVENTIONS:  - Monitor WBC    Outcome: Progressing     Problem: SAFETY ADULT  Goal: Patient will remain free of falls  Description: INTERVENTIONS:  - Educate patient/family on patient safety including physical limitations  - Instruct patient to call for assistance with activity   - Consult OT/PT to assist with strengthening/mobility   - Keep Call bell within reach  - Keep bed low and locked with side rails adjusted as appropriate  - Keep care items and personal belongings within reach  - Initiate and maintain comfort rounds  - Make Fall Risk Sign visible to staff  - Offer Toileting every  Hours, in advance of need  - Initiate/Maintain alarm  - Obtain necessary fall risk management equipment:   - Apply yellow socks and bracelet for high fall risk patients  - Consider moving patient to room near nurses station  Outcome: Progressing  Goal: Maintain or return to baseline ADL function  Description: INTERVENTIONS:  -  Assess patient's ability to carry out ADLs; assess patient's baseline for ADL function and identify physical deficits which impact ability to perform ADLs (bathing, care of mouth/teeth, toileting, grooming, dressing, etc )  - Assess/evaluate cause of self-care deficits   - Assess range of motion  - Assess patient's mobility; develop plan if impaired  - Assess patient's need for assistive devices and provide as appropriate  - Encourage maximum independence but intervene and supervise when necessary  - Involve family in performance of ADLs  - Assess for home care needs following discharge   - Consider OT consult to assist with ADL evaluation and planning for discharge  - Provide patient education as appropriate  Outcome: Progressing  Goal: Maintains/Returns to pre admission functional level  Description: INTERVENTIONS:  - Perform BMAT or MOVE assessment daily    - Set and communicate daily mobility goal to care team and patient/family/caregiver  - Collaborate with rehabilitation services on mobility goals if consulted  - Perform Range of Motion  times a day  - Reposition patient every  hours    - Dangle patient  times a day  - Stand patient  times a day  - Ambulate patient  times a day  - Out of bed to chair  times a day   - Out of bed for meals times a day  - Out of bed for toileting  - Record patient progress and toleration of activity level   Outcome: Progressing     Problem: Knowledge Deficit  Goal: Patient/family/caregiver demonstrates understanding of disease process, treatment plan, medications, and discharge instructions  Description: Complete learning assessment and assess knowledge base   Interventions:  - Provide teaching at level of understanding  - Provide teaching via preferred learning methods  Outcome: Progressing  Goal: Verbalizes understanding of labor plan  Description: Assess patient/family/caregiver's baseline knowledge level and ability to understand information  Provide education via patient/family/caregiver's preferred learning method at appropriate level of understanding  1  Provide teaching at level of understanding  2  Provide teaching via preferred learning method(s)  Outcome: Progressing     Problem: DISCHARGE PLANNING  Goal: Discharge to home or other facility with appropriate resources  Description: INTERVENTIONS:  - Identify barriers to discharge w/patient and caregiver  - Arrange for needed discharge resources and transportation as appropriate  - Identify discharge learning needs (meds, wound care, etc )  - Arrange for interpretive services to assist at discharge as needed  - Refer to Case Management Department for coordinating discharge planning if the patient needs post-hospital services based on physician/advanced practitioner order or complex needs related to functional status, cognitive ability, or social support system  Outcome: Progressing     Problem: Labor & Delivery  Goal: Manages discomfort  Description: Assess and monitor for signs and symptoms of discomfort  Assess patient's pain level regularly and per hospital policy  Administer medications as ordered  Support use of nonpharmacological methods to help control pain such as distraction, imagery, relaxation, and application of heat and cold  Collaborate with interdisciplinary team and patient to determine appropriate pain management plan  1  Include patient in decisions related to comfort  2  Offer non-pharmacological pain management interventions  3  Report ineffective pain management to physician  Outcome: Progressing  Goal: Patient vital signs are stable  Description: 1   Assess vital signs - vaginal delivery    Outcome: Progressing

## 2023-05-20 NOTE — OB LABOR/OXYTOCIN SAFETY PROGRESS
Oxytocin Safety Progress Check Note - Freedom Jun 27 y o  female MRN: [id number]    Unit/Bed#: -01 Encounter: [id number]    Dose (robbie-units/min) Oxytocin: 25 robbie-units/min  Contraction Frequency (minutes): 2-2 5  Contraction Quality: Moderate  Tachysystole: No   Cervical Dilation: 9        Cervical Effacement: 90  Fetal Station: 0  Baseline Rate: 135 bpm  Fetal Heart Rate: 155 BPM  FHR Category: Category I               Vital Signs:   Vitals:    05/20/23 0912   BP: 116/67   Pulse: 81   Resp:    Temp: 98 1 °F (36 7 °C)   SpO2:        Notes/comments: SVE 9/90/0  FHT is CAT 1 with anterior lip on the left side of patient  Contractions Q2 with pit at 24  Plan to reposition baby to her left side recheck in 2 hours or when patient feels more pressure             Yuan Briseno MD 5/20/2023 9:15 AM

## 2023-05-20 NOTE — OB LABOR/OXYTOCIN SAFETY PROGRESS
Oxytocin Safety Progress Check Note - Alba Burgos 27 y o  female MRN: [id number]    Unit/Bed#: -01 Encounter: [id number]    Dose (robbie-units/min) Oxytocin: 28 robbie-units/min  Contraction Frequency (minutes): 2 5-3 5  Contraction Quality: Strong  Tachysystole: No   Cervical Dilation: 10  Dilation Complete Date: 05/20/23  Dilation Complete Time: 1507  Cervical Effacement: 100  Fetal Station: 1  Baseline Rate: 130 bpm  Fetal Heart Rate: 127 BPM  FHR Category: Category II               Vital Signs:   Vitals:    05/20/23 1458   BP: 125/75   Pulse: 79   Resp:    Temp:    SpO2:        Notes/comments:   Pt complete  Fetal head w/ molding  Started pushing - will need coaching  Tracing Cat 2, reassuring with moderate variability and accels  Continue monitoring closely while pushing           Patsy Quijano MD 5/20/2023 3:23 PM

## 2023-05-20 NOTE — OB LABOR/OXYTOCIN SAFETY PROGRESS
Oxytocin Safety Progress Check Note - Eudelia Schilder 27 y o  female MRN: [id number]    Unit/Bed#: -01 Encounter: [id number]    Dose (robbie-units/min) Oxytocin: 28 robbie-units/min  Contraction Frequency (minutes): 1 5-2 5  Contraction Quality: Strong  Tachysystole: No   Cervical Dilation: Lip/rim (Comment)        Cervical Effacement: 90  Fetal Station: 1  Baseline Rate: 135 bpm  Fetal Heart Rate: 132 BPM  FHR Category: Category II               Vital Signs:   Vitals:    05/20/23 1229   BP: 132/59   Pulse: 63   Resp:    Temp:    SpO2:        Notes/comments: SVE now 9 5/90/+1  Anterior smaller than last check  FHT is CAT II due to intermittent late decelerations  Minimal variability responsive to scalp stimulation  Contractions Q 2 with pitocin at 28  Plan to recheck in 1 hour          Talia Cornell MD 5/20/2023 1:22 PM

## 2023-05-20 NOTE — OB LABOR/OXYTOCIN SAFETY PROGRESS
Oxytocin Safety Progress Check Note - Arnulfo Stallings 27 y o  female MRN: [id number]    Unit/Bed#: -01 Encounter: [id number]    Dose (robbie-units/min) Oxytocin: 6 robbie-units/min  Contraction Frequency (minutes): 3-7 (difficult tracing due to maternal position)  Contraction Quality: Mild  Tachysystole: No   Cervical Dilation: 6   Cervical Effacement: 90  Fetal Station: -1  Baseline Rate: 115 bpm  Fetal Heart Rate: 112 BPM  FHR Category: Category I    Vital Signs:   Vitals:    05/19/23 2328   BP: (!) 85/49   Pulse: 71   Resp:    Temp:    SpO2:        Notes/comments: patient was reexamined  She is resting comfortably s/p epidural  Cervix is now 6/90/-1  IUPC was placed for contraction monitoring to guide pitocin titration since her contractions are difficult to trace when she lays on her side  Tracing remains category I  Will discuss with Dr Lorrie Schultz MD 5/19/2023 11:53 PM

## 2023-05-20 NOTE — OB LABOR/OXYTOCIN SAFETY PROGRESS
Oxytocin Safety Progress Check Note - Yojana Abraham 27 y o  female MRN: [id number]    Unit/Bed#: -01 Encounter: [id number]    Dose (robbie-units/min) Oxytocin: 22 robbie-units/min  Contraction Frequency (minutes): 2  Contraction Quality: Moderate  Tachysystole: No   Cervical Dilation: 7        Cervical Effacement: 90  Fetal Station: -1  Baseline Rate: 130 bpm  Fetal Heart Rate: 132 BPM  FHR Category: Category I               Vital Signs:   Vitals:    05/20/23 0742   BP: 116/60   Pulse: 80   Resp:    Temp:    SpO2:        Notes/comments: FHT is CAT 1  Contractions Q 2 and regular  SVE deferred  Plan to recheck cervix in 2 hours           Ela Gant MD 5/20/2023 7:53 AM

## 2023-05-20 NOTE — OB LABOR/OXYTOCIN SAFETY PROGRESS
Oxytocin Safety Progress Check Note - Jt Benites 27 y o  female MRN: [id number]    Unit/Bed#: -01 Encounter: [id number]    Dose (robbie-units/min) Oxytocin: 24 robbie-units/min  Contraction Frequency (minutes): 2-2 5  Contraction Quality: Moderate  Tachysystole: No   Cervical Dilation: 9        Cervical Effacement: 90  Fetal Station: 0  Baseline Rate: 135 bpm  Fetal Heart Rate: 138 BPM  FHR Category: Category II               Vital Signs:   Vitals:    05/20/23 1013   BP: 139/76   Pulse: 71   Resp:    Temp:    SpO2:        Notes/comments: SVE as above and unchanged  FHT cAT 1  Pt feeling pressure  Plan to recheck in 2 hours or when patient feels pressure           Eliza Monroy MD 5/20/2023 10:25 AM

## 2023-05-21 LAB
ERYTHROCYTE [DISTWIDTH] IN BLOOD BY AUTOMATED COUNT: 14.6 % (ref 11.6–15.1)
HCT VFR BLD AUTO: 26.5 % (ref 34.8–46.1)
HGB BLD-MCNC: 8.9 G/DL (ref 11.5–15.4)
MCH RBC QN AUTO: 27.5 PG (ref 26.8–34.3)
MCHC RBC AUTO-ENTMCNC: 33.6 G/DL (ref 31.4–37.4)
MCV RBC AUTO: 82 FL (ref 82–98)
PLATELET # BLD AUTO: 185 THOUSANDS/UL (ref 149–390)
PMV BLD AUTO: 11.5 FL (ref 8.9–12.7)
RBC # BLD AUTO: 3.24 MILLION/UL (ref 3.81–5.12)
WBC # BLD AUTO: 19.15 THOUSAND/UL (ref 4.31–10.16)

## 2023-05-21 RX ORDER — ENOXAPARIN SODIUM 100 MG/ML
40 INJECTION SUBCUTANEOUS EVERY 12 HOURS
Status: DISCONTINUED | OUTPATIENT
Start: 2023-05-21 | End: 2023-05-23 | Stop reason: HOSPADM

## 2023-05-21 RX ORDER — OXYCODONE HYDROCHLORIDE 5 MG/1
5 TABLET ORAL EVERY 4 HOURS PRN
Status: DISCONTINUED | OUTPATIENT
Start: 2023-05-21 | End: 2023-05-23 | Stop reason: HOSPADM

## 2023-05-21 RX ORDER — OXYCODONE HYDROCHLORIDE 10 MG/1
10 TABLET ORAL EVERY 4 HOURS PRN
Status: DISCONTINUED | OUTPATIENT
Start: 2023-05-21 | End: 2023-05-23 | Stop reason: HOSPADM

## 2023-05-21 RX ADMIN — DOCUSATE SODIUM 100 MG: 100 CAPSULE, LIQUID FILLED ORAL at 08:25

## 2023-05-21 RX ADMIN — ENOXAPARIN SODIUM 40 MG: 40 INJECTION SUBCUTANEOUS at 08:25

## 2023-05-21 RX ADMIN — KETOROLAC TROMETHAMINE 30 MG: 30 INJECTION, SOLUTION INTRAMUSCULAR at 02:01

## 2023-05-21 RX ADMIN — ACETAMINOPHEN 975 MG: 325 TABLET ORAL at 04:25

## 2023-05-21 RX ADMIN — KETOROLAC TROMETHAMINE 30 MG: 30 INJECTION, SOLUTION INTRAMUSCULAR at 08:25

## 2023-05-21 RX ADMIN — ACETAMINOPHEN 975 MG: 325 TABLET ORAL at 12:16

## 2023-05-21 RX ADMIN — KETOROLAC TROMETHAMINE 30 MG: 30 INJECTION, SOLUTION INTRAMUSCULAR at 20:05

## 2023-05-21 RX ADMIN — ACETAMINOPHEN 975 MG: 325 TABLET ORAL at 18:09

## 2023-05-21 RX ADMIN — DOCUSATE SODIUM 100 MG: 100 CAPSULE, LIQUID FILLED ORAL at 18:09

## 2023-05-21 RX ADMIN — ENOXAPARIN SODIUM 40 MG: 40 INJECTION SUBCUTANEOUS at 23:32

## 2023-05-21 RX ADMIN — IRON SUCROSE 200 MG: 20 INJECTION, SOLUTION INTRAVENOUS at 10:43

## 2023-05-21 RX ADMIN — SODIUM CHLORIDE, SODIUM LACTATE, POTASSIUM CHLORIDE, AND CALCIUM CHLORIDE 125 ML/HR: .6; .31; .03; .02 INJECTION, SOLUTION INTRAVENOUS at 05:47

## 2023-05-21 RX ADMIN — ACETAMINOPHEN 975 MG: 325 TABLET ORAL at 23:32

## 2023-05-21 NOTE — OP NOTE
OPERATIVE REPORT  PATIENT NAME: Georgi Haines    :  1990  MRN: [id number]  Pt Location: [facility] L&D OR ROOM 02    SURGERY DATE: 2023    Surgeon(s) and Role:     * Elizabeth Fry MD - Primary     * Franko Foreman MD - [place] [address] Debby Lima MD - Assisting    Preop Diagnosis:  Arrest of descent, delivered, current hospitalization [O62 1]    Post-Op Diagnosis Codes:     * Arrest of descent, delivered, current hospitalization [O62 1]    Procedure(s) (LRB):   SECTION () (N/A)    Specimen(s):  ID Type Source Tests Collected by Time Destination   1 :  Tissue (Placenta on Hold) OB Only Placenta TISSUE EXAM OB (PLACENTA) ONLY Elizabeth Fry MD 2023 192    A :  Cord Blood Cord BLOOD GAS, VENOUS, CORD, BLOOD GAS, ARTERIAL, CORD (Canceled) Elizabeth Fry MD 2023 191    CYST : ovrian cyst Tissue Ovary, Cyst TISSUE EXAM Elizabeth Fry MD 2023 1940        Surgical QBL:  Surgical QBL (mL): 830 mL      Drains:  Urethral Catheter Non-latex 16 Fr   (Active)   Reasons to continue Urinary Catheter  Post-operative urological requirements 23   Goal for Removal Remove POD#1 23   Site Assessment Clean;Skin intact 23   Alfaro Care Done 23   Collection Container Standard drainage bag 23   Securement Method Securing device (Describe) 23   Output (mL) 85 mL 23 1401   Number of days: 0       Anesthesia Type:   Spinal    Operative Indications:  Arrest of descent, delivered, current hospitalization [O62 1]       Blade Group Classification System:  No Multiple pregnancy, No Transverse or oblique lie, No Breech lie, Gestational age is > or =37 weeks, Nulliparous, Labor induced +  is BLADE GROUP 2a    Brief Labor Course  Georgi Haines is now a 27 y o  G 1 P 0 who was initially admitted at 45 w 6 d for spontaneous rupture of membranes versus prelabor rupture of membranes  She was started on Pitocin and received an epidural for pain control  During course of labor, an IUPC was placed for contraction monitoring to guide Pitocin titration  Patient was noted to have low urine output through her labor course, and at time of delivery was noted to be net positive by proximately 1 7 L  She progressed to complete and was noted to have fetal scalp molding  We proceeded to push for 2 hours without any descent despite coaching  The patient was offered a continued trial of pushing versus proceeding with a  section  The patient chose to proceed with  section for the indication of maternal request and protracted descent      Operative Findings:  1  Viable female   at 18 with APGARs of 8 and 9 at 1 and 5 minutes  Fetus weighted 8lb 11oz  2  Normal intact placenta with centrally inserted 3VC expressed at [address]    3  Normal uterus, bilateral tubes and ovaries  4  Blood gases:   Arterial pH: NA   Arterial base excess: NA   Venous pH: 7 367   Venous base excess: -4 3    The patient was taken to the operating room  Spinal  anesthesia was adequately established and 2g of Ancef  was given for preoperative prophylaxis  The patient was then placed in the dorsal supine position with a left tilt of the hips  The patient was then prepped with betadine for vaginal prep and chloraprep for abdominal prep and draped in the usual sterile fashion for a Pfannenstiel skin incision  A time out was performed to confirm correct patient and correct procedure  An incision was made in the skin with a surgical scalpel and sharp dissection was carried out over subsequent layers of tissue including the fascia, followed by the Bovie electrocautery for hemostasis  The fascia was incised at the midline and the fascial incision was extended bilaterally using the curved Davalos scissors        The superior edge of the fascial incision was grasped with Kocher clamps, tented up and the underlying rectus muscles were dissected off bluntly  The rectus muscles were then divided at midline and the peritoneum was identified, tented up at its upper margin taking care to avoid the bladder, and then entered  The peritoneal incision was extended superiorly and inferiorly  The bladder blade was inserted and the vesicouterine peritoneum was identified  2  A bladder blade was reinserted and a transverse incision was made in the lower uterine segment using a new surgical blade  The uterine incision was extended cephalad and caudal using blunt dissection  The amniotic sac was entered and the amniotic fluid was noted to be meconium stained   The surgeon's hand was placed into the uterine cavity  The fetal head was identified and elevated through the uterine incision with the assistance of fundal pressure  With gentle traction, the shoulder was delivered, followed by the rest of the fetal body  There was no nuchal cord noted  On delivery the cord was doubly clamped and cut after delayed cord clamping  The infant was then passed off the table to the awaiting  staff  Venous and arterial blood gas, cord blood, and portion of cord was obtained for analysis and routine blood testing  The placenta delivery was then sent to storage  Placenta was noted to be intact with a centrally inserted three-vessel cord  Oxytocin was administered by IV infusion to enhance uterine contraction  The uterus was exteriorized and cleared of all clots and remaining products of conception  The uterine incision was re approximated using a 0 Vicryl  in a running locked fashion  A second vertical imbricating stitch with 0 Vicryl  was applied  The uterine incision was examined and noted to be hemostatic  The posterior cul-de-sac was cleared of all clots and products of conception  The uterus was replaced into the abdomen and the pericolic gutters were cleared of all clots    The uterine incision was once again reexamined and noted to be hemostatic  The fascia was re approximated using 0- Vicryl  in a running nonlocked fashion  The subcutaneous tissue was irrigated and cleared of all clots and debris  Good hemostasis was noted with Bovie electrocautery  The subcutaneous  tissue was reapproximated with 2-0 Plain  The skin incision was closed using 4-0 Vicryl on Pedro Luis needle  Exofin Good hemostasis was noted  Patient tolerated the procedure well  All needle, sponge, and instrument counts were noted to be correct x 2 at the end of the procedure  Patient was transferred to the recovery room in stable condition  Dr Jackie Knight was present for the procedure  Complications:   None    Patient Disposition:  PACU      Late entry due to acuity on L&D floor and patient care         SIGNATURE: Anahi Vergara MD  DATE: May 20, 2023  TIME: 8:37 PM

## 2023-05-21 NOTE — PROGRESS NOTES
"Progress Note - OB/GYN  Grady Rebolledo 27 y o  female MRN: [id number]  Unit/Bed#: -01 Encounter: [id number]    Assessment and Plan     Grady Rebolledo is a patient of: [facility]  She is PPD# 1 s/p  primary  section, low transverse incision  Recovering well and is stable       * Status post primary low transverse  section  Assessment & Plan  , Hgb 10 4 --> 8 9, venofer ordered  Lines: pruitt in place, to be removed  Pain: Tylenol and toradol scheduled, jennifer 5/10 PRN    FEN: Tolerating regular diet  DVT ppx: SCDs and Lovenox 40mg qD  Passing flatus   Incision C/D/I         Rh negative status during pregnancy  Assessment & Plan  RhoGAM ordered        Disposition    - Anticipate discharge home on PPD# 2-4      Subjective/Objective     Chief Complaint: Postpartum State     Subjective:    Grady Rebolledo is PPD/POD#1 s/p  primary  section, low transverse incision  She has no current complaints  Pain is well controlled  Patient is currently voiding  She is ambulating  Patient is currently passing flatus and has had no bowel movement  She is tolerating PO, and denies nausea or vomitting  Patient denies fever, chills, chest pain, shortness of breath, or calf tenderness  Lochia is minimal  She is  Breastfeeding  She is recovering well and is stable         Vitals:   /61 (BP Location: Left arm)   Pulse 61   Temp 98 4 °F (36 9 °C) (Oral)   Resp 18   Ht 5' 5\" (1 651 m)   Wt 109 kg (240 lb)   LMP 2022   SpO2 96%   Breastfeeding Yes   BMI 39 94 kg/m²       Intake/Output Summary (Last 24 hours) at 2023 0726  Last data filed at 2023 0601  Gross per 24 hour   Intake 1500 ml   Output 2495 ml   Net -995 ml       Invasive Devices     Peripheral Intravenous Line  Duration           Peripheral IV 23 Distal;Left;Ventral (anterior) Forearm 1 day    Peripheral IV 23 Right Antecubital <1 day          Drain  Duration           " Urethral Catheter Non-latex 16 Fr  <1 day                Physical Exam:   GEN: Flores Mabry appears well, alert and oriented x 3, pleasant and cooperative   CARDIO: RRR, no murmurs or rubs  RESP:  CTAB, no wheezes or rales  ABDOMEN: soft, no tenderness, no distention, fundus @ U-2, Incision C/D/I  EXTREMITIES: SCDs on, non tender, no erythema, b/l Bernard's sign negative      Labs:     Hemoglobin   Date Value Ref Range Status   05/21/2023 8 9 (L) 11 5 - 15 4 g/dL Final   05/20/2023 10 4 (L) 11 5 - 15 4 g/dL Final     WBC   Date Value Ref Range Status   05/21/2023 19 15 (H) 4 31 - 10 16 Thousand/uL Final   05/20/2023 16 87 (H) 4 31 - 10 16 Thousand/uL Final     Platelets   Date Value Ref Range Status   05/21/2023 185 149 - 390 Thousands/uL Final   05/20/2023 189 149 - 390 Thousands/uL Final     Creatinine   Date Value Ref Range Status   05/19/2023 0 55 (L) 0 60 - 1 30 mg/dL Final     Comment:     Standardized to IDMS reference method     AST   Date Value Ref Range Status   05/19/2023 18 13 - 39 U/L Final     ALT   Date Value Ref Range Status   05/19/2023 14 7 - 52 U/L Final     Comment:     Specimen collection should occur prior to Sulfasalazine administration due to the potential for falsely depressed results             Diana Regalado MD  5/21/2023  7:26 AM

## 2023-05-21 NOTE — PLAN OF CARE
Problem: PAIN - ADULT  Goal: Verbalizes/displays adequate comfort level or baseline comfort level  Description: Interventions:  - Encourage patient to monitor pain and request assistance  - Assess pain using appropriate pain scale  - Administer analgesics based on type and severity of pain and evaluate response  - Implement non-pharmacological measures as appropriate and evaluate response  - Consider cultural and social influences on pain and pain management  - Notify physician/advanced practitioner if interventions unsuccessful or patient reports new pain  Outcome: Progressing     Problem: INFECTION - ADULT  Goal: Absence or prevention of progression during hospitalization  Description: INTERVENTIONS:  - Assess and monitor for signs and symptoms of infection  - Monitor lab/diagnostic results  - Monitor all insertion sites, i e  indwelling lines, tubes, and drains  - Monitor endotracheal if appropriate and nasal secretions for changes in amount and color  - [place] appropriate cooling/warming therapies per order  - Administer medications as ordered  - Instruct and encourage patient and family to use good hand hygiene technique  - Identify and instruct in appropriate isolation precautions for identified infection/condition  Outcome: Progressing  Goal: Absence of fever/infection during neutropenic period  Description: INTERVENTIONS:  - Monitor WBC    Outcome: Progressing     Problem: SAFETY ADULT  Goal: Patient will remain free of falls  Description: INTERVENTIONS:  - Educate patient/family on patient safety including physical limitations  - Instruct patient to call for assistance with activity   - Consult OT/PT to assist with strengthening/mobility   - Keep Call bell within reach  - Keep bed low and locked with side rails adjusted as appropriate  - Keep care items and personal belongings within reach  - Initiate and maintain comfort rounds  - Make Fall Risk Sign visible to staff  - Apply yellow socks and bracelet for high fall risk patients  - Consider moving patient to room near nurses station  Outcome: Progressing  Goal: Maintain or return to baseline ADL function  Description: INTERVENTIONS:  -  Assess patient's ability to carry out ADLs; assess patient's baseline for ADL function and identify physical deficits which impact ability to perform ADLs (bathing, care of mouth/teeth, toileting, grooming, dressing, etc )  - Assess/evaluate cause of self-care deficits   - Assess range of motion  - Assess patient's mobility; develop plan if impaired  - Assess patient's need for assistive devices and provide as appropriate  - Encourage maximum independence but intervene and supervise when necessary  - Involve family in performance of ADLs  - Assess for home care needs following discharge   - Consider OT consult to assist with ADL evaluation and planning for discharge  - Provide patient education as appropriate  Outcome: Progressing  Goal: Maintains/Returns to pre admission functional level  Description: INTERVENTIONS:  - Perform BMAT or MOVE assessment daily    - Set and communicate daily mobility goal to care team and patient/family/caregiver     - Out of bed for toileting  - Record patient progress and toleration of activity level   Outcome: Progressing     Problem: DISCHARGE PLANNING  Goal: Discharge to home or other facility with appropriate resources  Description: INTERVENTIONS:  - Identify barriers to discharge w/patient and caregiver  - Arrange for needed discharge resources and transportation as appropriate  - Identify discharge learning needs (meds, wound care, etc )  - Arrange for interpretive services to assist at discharge as needed  - Refer to Case Management Department for coordinating discharge planning if the patient needs post-hospital services based on physician/advanced practitioner order or complex needs related to functional status, cognitive ability, or social support system  Outcome: Progressing     Problem: POSTPARTUM  Goal: Experiences normal postpartum course  Description: INTERVENTIONS:  - Monitor maternal vital signs  - Assess uterine involution and lochia  Outcome: Progressing  Goal: Appropriate maternal -  bonding  Description: INTERVENTIONS:  - Identify family support  - Assess for appropriate maternal/infant bonding   -Encourage maternal/infant bonding opportunities  - Referral to  or  as needed  Outcome: Progressing  Goal: Establishment of infant feeding pattern  Description: INTERVENTIONS:  - Assess breast/bottle feeding  - Refer to lactation as needed  Outcome: Progressing  Goal: Incision(s), wounds(s) or drain site(s) healing without S/S of infection  Description: INTERVENTIONS  - Assess and document dressing, incision, wound bed, drain sites and surrounding tissue  - Provide patient and family education    Outcome: Progressing

## 2023-05-21 NOTE — ANESTHESIA POSTPROCEDURE EVALUATION
Post-Op Assessment Note    CV Status:  Stable  Pain Score: 0    Pain management: adequate     Mental Status:  Alert and awake   Hydration Status:  Euvolemic   PONV Controlled:  Controlled   Airway Patency:  Patent      Post Op Vitals Reviewed: Yes        Post-op block assessment: catheter intact and no complications      No notable events documented      BP   101/53   Temp      Pulse  70   Resp   17   SpO2   95

## 2023-05-21 NOTE — DISCHARGE SUMMARY
Obstetrics Discharge Summary   Lynn Leon 27 y o  female MRN: [id number]  Unit/Bed#: -01 Encounter: [id number]    Admission Date: 2023     Discharge Date: 23    Admitting Diagnoses:   Pregnancy at 38 weeks 6 days  Rh- status  GBS negative status    Discharge Diagnoses:   Same, delivered    Procedures:   primary  section, low transverse incision    Admitting Attending: Dr Pedro Vanessa  Delivery Attending: Dr Eliza Persaud  Discharge Attending: Sergo Garvin Course:   Lynn Leon is now a 27 y o  G 1 P 0 who was initially admitted at 45 w 6 d for spontaneous rupture of membranes versus prelabor rupture of membranes  She was started on Pitocin and received an epidural for pain control  During course of labor, an IUPC was placed for contraction monitoring to guide Pitocin titration  Patient was noted to have low urine output through her labor course, and at time of delivery was noted to be net positive by proximately 1 7 L  She progressed to complete and was noted to have fetal scalp molding  We proceeded to push for 2 hours without any descent despite coaching  The patient was offered a continued trial of pushing versus proceeding with a  section  The patient chose to proceed with  section for the indication of maternal request and protracted descent    She underwent an uncomplicated  low transverse  section delivery on 2023 and delivered a viable female  at 18  APGARS were 8, 9 at 1 and 5 minutes, respectively  's birth weight was 8 lb 11 2 oz  Placenta delivered with gentle cord traction and uterine massage without difficulty   was admitted to the  nursery  Patient tolerated the procedure well and was transferred to recovery in stable condition  The patient's post partum course was unremarkable    Her preoperative hemoglobin was 10 4g/dL, postoperative was 8 9 and received venofer   Her postoperative pain was well controlled with oral analgesics  On day of discharge, she was ambulating and able to reasonably perform all ADLs  She was voiding and had appropriate bowel function  Pain was well controlled  She was discharged home on post-operative day #3 without complications  Patient was instructed to follow up with her OB as an outpatient and was given appropriate warnings to call provider if she develops signs of infection or uncontrolled pain  Complications:   None    Condition at discharge:   good     Provisions for Follow-Up Care:  See after visit summary for information related to follow-up care and any pertinent home health orders  Disposition:   See After Visit Summary for discharge disposition information  Planned Readmission:   No    Discharge Medications:   Please see AVS for a complete list of discharge medications  Discharge instructions :   Please see AVS for complete discharge instructions      Emile MILLS PGY1

## 2023-05-21 NOTE — ASSESSMENT & PLAN NOTE
, Hgb 10 4 --> 8 9, venofer given   Lines: pruitt removed, passed void trial   Pain: Tylenol and toradol scheduled, jennifer 5/10 PRN    FEN: Tolerating regular diet  DVT ppx: SCDs and Lovenox 40mg qD  Passing flatus   Incision C/D/I   Depo ordered

## 2023-05-21 NOTE — PLAN OF CARE
Problem: PAIN - ADULT  Goal: Verbalizes/displays adequate comfort level or baseline comfort level  Description: Interventions:  - Encourage patient to monitor pain and request assistance  - Assess pain using appropriate pain scale  - Administer analgesics based on type and severity of pain and evaluate response  - Implement non-pharmacological measures as appropriate and evaluate response  - Consider cultural and social influences on pain and pain management  - Notify physician/advanced practitioner if interventions unsuccessful or patient reports new pain  Outcome: Progressing     Problem: INFECTION - ADULT  Goal: Absence or prevention of progression during hospitalization  Description: INTERVENTIONS:  - Assess and monitor for signs and symptoms of infection  - Monitor lab/diagnostic results  - Monitor all insertion sites, i e  indwelling lines, tubes, and drains  - Monitor endotracheal if appropriate and nasal secretions for changes in amount and color  - [place] appropriate cooling/warming therapies per order  - Administer medications as ordered  - Instruct and encourage patient and family to use good hand hygiene technique  - Identify and instruct in appropriate isolation precautions for identified infection/condition  Outcome: Progressing  Goal: Absence of fever/infection during neutropenic period  Description: INTERVENTIONS:  - Monitor WBC    Outcome: Progressing     Problem: SAFETY ADULT  Goal: Patient will remain free of falls  Description: INTERVENTIONS:  - Educate patient/family on patient safety including physical limitations  - Instruct patient to call for assistance with activity   - Consult OT/PT to assist with strengthening/mobility   - Keep Call bell within reach  - Keep bed low and locked with side rails adjusted as appropriate  - Keep care items and personal belongings within reach  - Initiate and maintain comfort rounds  - Make Fall Risk Sign visible to staff  - Obtain necessary fall risk management equipment:   - Apply yellow socks and bracelet for high fall risk patients  - Consider moving patient to room near nurses station  Outcome: Progressing  Goal: Maintain or return to baseline ADL function  Description: INTERVENTIONS:  -  Assess patient's ability to carry out ADLs; assess patient's baseline for ADL function and identify physical deficits which impact ability to perform ADLs (bathing, care of mouth/teeth, toileting, grooming, dressing, etc )  - Assess/evaluate cause of self-care deficits   - Assess range of motion  - Assess patient's mobility; develop plan if impaired  - Assess patient's need for assistive devices and provide as appropriate  - Encourage maximum independence but intervene and supervise when necessary  - Involve family in performance of ADLs  - Assess for home care needs following discharge   - Consider OT consult to assist with ADL evaluation and planning for discharge  - Provide patient education as appropriate  Outcome: Progressing  Goal: Maintains/Returns to pre admission functional level  Description: INTERVENTIONS:  - Perform BMAT or MOVE assessment daily    - Set and communicate daily mobility goal to care team and patient/family/caregiver     - Collaborate with rehabilitation services on mobility goals if consulted  - Ambulate patient 5 times a day  - Out of bed to chair 5 times a day   - Out of bed for meals 3 times a day  - Out of bed for toileting  - Record patient progress and toleration of activity level   Outcome: Progressing     Problem: DISCHARGE PLANNING  Goal: Discharge to home or other facility with appropriate resources  Description: INTERVENTIONS:  - Identify barriers to discharge w/patient and caregiver  - Arrange for needed discharge resources and transportation as appropriate  - Identify discharge learning needs (meds, wound care, etc )  - Arrange for interpretive services to assist at discharge as needed  - Refer to Case Management Department for coordinating discharge planning if the patient needs post-hospital services based on physician/advanced practitioner order or complex needs related to functional status, cognitive ability, or social support system  Outcome: Progressing     Problem: POSTPARTUM  Goal: Experiences normal postpartum course  Description: INTERVENTIONS:  - Monitor maternal vital signs  - Assess uterine involution and lochia  Outcome: Progressing  Goal: Appropriate maternal -  bonding  Description: INTERVENTIONS:  - Identify family support  - Assess for appropriate maternal/infant bonding   -Encourage maternal/infant bonding opportunities  - Referral to  or  as needed  Outcome: Progressing  Goal: Establishment of infant feeding pattern  Description: INTERVENTIONS:  - Assess breast/bottle feeding  - Refer to lactation as needed  Outcome: Progressing  Goal: Incision(s), wounds(s) or drain site(s) healing without S/S of infection  Description: INTERVENTIONS  - Assess and document dressing, incision, wound bed, drain sites and surrounding tissue  - Provide patient and family education  Outcome: Progressing

## 2023-05-21 NOTE — DISCHARGE INSTRUCTIONS

## 2023-05-21 NOTE — PLAN OF CARE
Problem: PAIN - ADULT  Goal: Verbalizes/displays adequate comfort level or baseline comfort level  Description: Interventions:  - Encourage patient to monitor pain and request assistance  - Assess pain using appropriate pain scale  - Administer analgesics based on type and severity of pain and evaluate response  - Implement non-pharmacological measures as appropriate and evaluate response  - Consider cultural and social influences on pain and pain management  - Notify physician/advanced practitioner if interventions unsuccessful or patient reports new pain  Outcome: Progressing     Problem: INFECTION - ADULT  Goal: Absence or prevention of progression during hospitalization  Description: INTERVENTIONS:  - Assess and monitor for signs and symptoms of infection  - Monitor lab/diagnostic results  - Monitor all insertion sites, i e  indwelling lines, tubes, and drains  - Monitor endotracheal if appropriate and nasal secretions for changes in amount and color  - [place] appropriate cooling/warming therapies per order  - Administer medications as ordered  - Instruct and encourage patient and family to use good hand hygiene technique  - Identify and instruct in appropriate isolation precautions for identified infection/condition  Outcome: Progressing  Goal: Absence of fever/infection during neutropenic period  Description: INTERVENTIONS:  - Monitor WBC    Outcome: Progressing     Problem: SAFETY ADULT  Goal: Patient will remain free of falls  Description: INTERVENTIONS:  - Educate patient/family on patient safety including physical limitations  - Instruct patient to call for assistance with activity   - Consult OT/PT to assist with strengthening/mobility   - Keep Call bell within reach  - Keep bed low and locked with side rails adjusted as appropriate  - Keep care items and personal belongings within reach  - Initiate and maintain comfort rounds  - Make Fall Risk Sign visible to staff  - Offer Toileting every  Hours, in advance of need  - Initiate/Maintain alarm  - Obtain necessary fall risk management equipment:   - Apply yellow socks and bracelet for high fall risk patients  - Consider moving patient to room near nurses station  Outcome: Progressing  Goal: Maintain or return to baseline ADL function  Description: INTERVENTIONS:  -  Assess patient's ability to carry out ADLs; assess patient's baseline for ADL function and identify physical deficits which impact ability to perform ADLs (bathing, care of mouth/teeth, toileting, grooming, dressing, etc )  - Assess/evaluate cause of self-care deficits   - Assess range of motion  - Assess patient's mobility; develop plan if impaired  - Assess patient's need for assistive devices and provide as appropriate  - Encourage maximum independence but intervene and supervise when necessary  - Involve family in performance of ADLs  - Assess for home care needs following discharge   - Consider OT consult to assist with ADL evaluation and planning for discharge  - Provide patient education as appropriate  Outcome: Progressing  Goal: Maintains/Returns to pre admission functional level  Description: INTERVENTIONS:  - Perform BMAT or MOVE assessment daily    - Set and communicate daily mobility goal to care team and patient/family/caregiver  - Collaborate with rehabilitation services on mobility goals if consulted  - Perform Range of Motion  times a day  - Reposition patient every hours    - Dangle patient  times a day  - Stand patient  times a day  - Ambulate patient  times a day  - Out of bed to chair  times a day   - Out of bed for meal times a day  - Out of bed for toileting  - Record patient progress and toleration of activity level   Outcome: Progressing     Problem: DISCHARGE PLANNING  Goal: Discharge to home or other facility with appropriate resources  Description: INTERVENTIONS:  - Identify barriers to discharge w/patient and caregiver  - Arrange for needed discharge resources and transportation as appropriate  - Identify discharge learning needs (meds, wound care, etc )  - Arrange for interpretive services to assist at discharge as needed  - Refer to Case Management Department for coordinating discharge planning if the patient needs post-hospital services based on physician/advanced practitioner order or complex needs related to functional status, cognitive ability, or social support system  Outcome: Progressing

## 2023-05-22 LAB
ABO GROUP BLD: NORMAL
BLD GP AB SCN SERPL QL: NEGATIVE
FETAL CELL SCN BLD QL ROSETTE: NEGATIVE
RH BLD: NEGATIVE

## 2023-05-22 RX ADMIN — ACETAMINOPHEN 975 MG: 325 TABLET ORAL at 14:34

## 2023-05-22 RX ADMIN — ACETAMINOPHEN 975 MG: 325 TABLET ORAL at 22:19

## 2023-05-22 RX ADMIN — ENOXAPARIN SODIUM 40 MG: 40 INJECTION SUBCUTANEOUS at 08:26

## 2023-05-22 RX ADMIN — OXYCODONE HYDROCHLORIDE 5 MG: 5 TABLET ORAL at 08:30

## 2023-05-22 RX ADMIN — DOCUSATE SODIUM 100 MG: 100 CAPSULE, LIQUID FILLED ORAL at 17:56

## 2023-05-22 RX ADMIN — KETOROLAC TROMETHAMINE 30 MG: 30 INJECTION, SOLUTION INTRAMUSCULAR at 02:13

## 2023-05-22 RX ADMIN — OXYCODONE HYDROCHLORIDE 5 MG: 5 TABLET ORAL at 15:29

## 2023-05-22 RX ADMIN — ENOXAPARIN SODIUM 40 MG: 40 INJECTION SUBCUTANEOUS at 20:04

## 2023-05-22 RX ADMIN — DOCUSATE SODIUM 100 MG: 100 CAPSULE, LIQUID FILLED ORAL at 08:26

## 2023-05-22 RX ADMIN — IBUPROFEN 600 MG: 600 TABLET, FILM COATED ORAL at 14:34

## 2023-05-22 RX ADMIN — OXYCODONE HYDROCHLORIDE 5 MG: 5 TABLET ORAL at 04:19

## 2023-05-22 RX ADMIN — HUMAN RHO(D) IMMUNE GLOBULIN 300 MCG: 300 INJECTION, SOLUTION INTRAMUSCULAR at 08:59

## 2023-05-22 RX ADMIN — IBUPROFEN 600 MG: 600 TABLET, FILM COATED ORAL at 20:04

## 2023-05-22 NOTE — LACTATION NOTE
This note was copied from a baby's chart  Follow up Lactation: FOB called as baby is demonstrating feeding cues again  Ed  On cluster feeding  Assisted mom with latch on the right breast in cross cradle hold  Deep latch achieved with minimal assistance  Reviewed how milk supply is established  Reviewed signs of satiation and timing of feeds  At the end of 30 min  Feed, FOB called lactation again  States baby is crying, fussy after feeding  Ed  On watching baby, not clock for feedings  Ed  On DBM if needed and reinforced milk supply and non-nutritive suck at the breast  Baby calmed with s2s with FOB  Enc  To call lactation for next latch  Mom is encouraged to place baby skin to skin for feedings  Skin to skin education provided for baby placement on mother's chest, baby only in diaper, blankets below shoulders on baby's back  Skin to skin is encouraged to continue at home for feedings and between feedings  Provided education on alignment of nose to breast; bring baby to breast and not breast to baby; support head with opp  Hand in cross cradle; use pillows to lift baby to be belly to belly; ear, shoulder, hip alignment; Support mother's back and place self in comfortable position to support bringing baby to the breast  Shoulders should be down and away from ears  Discussed 2nd night syndrome and ways to calm infant  Hand out given  Information on hand expression given  Discussed benefits of knowing how to manually express breast including stimulating milk supply, softening nipple for latch and evacuating breast in the event of engorgement  Education and information provided about non-nutritive suck, role of colostrum, and benefits of skin to skin  Encouraged parents to call for assistance, questions, and concerns about breastfeeding  Extension provided

## 2023-05-22 NOTE — LACTATION NOTE
This note was copied from a baby's chart  Follow up Lactation: Baby was brought into the room from NICU  Baby is demonstrating early feeding cues  Demonstration and teach back of hand expression  Demonstration and teach back of nipple rolling  Brought baby s2s up to the breast  Ed  On positioning and alignment allows baby to latch in cross cradle on the right breast  Active, coordinated sucking  Education on how to est  Milk supply  Demonstration and teach back of breast compressions  Enc  Both breasts for every feeding  Enc  To call lactation for next latch    Handout: positioning at the breast     Provided education on growth spurts, when to introduce bottles; paced bottle feeding, and non-nutritive suck at the breast  Provided education on Signs of satiation  Encouraged to call lactation to observe a latch prior to discharge for reassurance  Encouraged to call baby and me with any questions and closely monitor output  Provided education on alignment of nose to breast; bring baby to breast and not breast to baby; support head with opp  Hand in cross cradle; use pillows to lift baby to be belly to belly; ear, shoulder, hip alignment; Support mother's back and place self in comfortable position to support bringing baby to the breast  Shoulders should be down and away from ears  Milk Supply:   - Allow for non-nutritive suck at the breast to stimulate supply   - Allow for skin to skin during and after each breastfeeding session   - Use massage, heat, and hand expression prior to feedings to assist with deep latch   - Increase pumping sessions and pump after every feeding    Demonstrated with teach back breast compressions during a feeding to increase milk transfer and stimulate suckling after a breathing/muscle break       Nurse on demand: when baby gives hunger cues; when your breasts feel full, or at least every 3 hours during the day and every 5 hours at night counting from the beginning of "one feeding to the beginning of the next; which ever comes first  When sucking and swallowing slow, gently compress the breast to restart flow  If active suck-swallow does not restart, gently remove the baby and offer the other breast; offering up to \"four\" breasts per feeding      "

## 2023-05-22 NOTE — LACTATION NOTE
This note was copied from a baby's chart  Follow up Lactation: mom states she hasn't fed baby since 6 - mom was counting from the end of the feed  Ed  On timing of feeds and signs of satiation  Ed  On positioning and placement  Mom like football on the right and cross cradle on the left  Deep latch achieved with pillows to lift the breast and baby  Ed  On alignment and how to achieve a deep latch  Active, coordinated sucks  Ed  On potential for cluster feeding overnight  Ed  On DBM available if mom needs a break  Education on positioning and alignment  Mom is encouraged to:     - Bring baby up to the breast (use of pillows to elevate so baby's torso is against mom's breasts)   - Skin to skin for feedings with top hand exposed to show signs of satiation   - Chin deep into breast tissue (make baby look up to the nipple)   - nose aligned to the nipple   -Wait for wide gape, drag chin on the breast so nipple is aimed at the upper, back palate  - Cheek should be touching breast   - Deep, firm hold of baby with ear, shoulder, hip alignment    Provided demonstration, education and support of deep latch to breast by placing the nipple to the nose, dragging down to chin to achieve a wide latch  Bring baby to the breast, not breast to baby  Move your shoulders down and away from your ears  Look for ear, shoulder, hip alignment  Baby's upper and lower lip should be flanged on the breast     Encouraged parents to call for assistance, questions, and concerns about breastfeeding  Extension provided

## 2023-05-22 NOTE — CASE MANAGEMENT
"   Case Management Progress Note    Patient name Shira Fernandez  Location /-54 MRN [id number]  : 1990 Date 2023       LOS (days): 3  Geometric Mean LOS (GMLOS) (days):   Days to GMLOS:        OBJECTIVE:        Current admission status: Inpatient  Preferred Pharmacy:   [facility]/pharmacy [id number] [place] [id number]  Phone: [phone] Fax: [phone]    Primary Care Provider: No primary care provider on file  Primary Insurance: [facility]  Secondary Insurance: [address] [place] [address],[facility]    PROGRESS NOTE:    Consult received re: \"Hx thc use  Uds neg\"    UDS negative for MOB and baby - consult closed  Plan for baby to D/C home with MOB when medically cleared      "

## 2023-05-22 NOTE — PLAN OF CARE
Problem: PAIN - ADULT  Goal: Verbalizes/displays adequate comfort level or baseline comfort level  Description: Interventions:  - Encourage patient to monitor pain and request assistance  - Assess pain using appropriate pain scale  - Administer analgesics based on type and severity of pain and evaluate response  - Implement non-pharmacological measures as appropriate and evaluate response  - Consider cultural and social influences on pain and pain management  - Notify physician/advanced practitioner if interventions unsuccessful or patient reports new pain  Outcome: Progressing     Problem: INFECTION - ADULT  Goal: Absence or prevention of progression during hospitalization  Description: INTERVENTIONS:  - Assess and monitor for signs and symptoms of infection  - Monitor lab/diagnostic results  - Monitor all insertion sites, i e  indwelling lines, tubes, and drains  - Monitor endotracheal if appropriate and nasal secretions for changes in amount and color  - [place] appropriate cooling/warming therapies per order  - Administer medications as ordered  - Instruct and encourage patient and family to use good hand hygiene technique  - Identify and instruct in appropriate isolation precautions for identified infection/condition  Outcome: Progressing  Goal: Absence of fever/infection during neutropenic period  Description: INTERVENTIONS:  - Monitor WBC    Outcome: Progressing     Problem: SAFETY ADULT  Goal: Patient will remain free of falls  Description: INTERVENTIONS:  - Educate patient/family on patient safety including physical limitations  - Instruct patient to call for assistance with activity   - Consult OT/PT to assist with strengthening/mobility   - Keep Call bell within reach  - Keep bed low and locked with side rails adjusted as appropriate  - Keep care items and personal belongings within reach  - Initiate and maintain comfort rounds  - Make Fall Risk Sign visible to staff  - Offer Toileting every  Hours, in advance of need  - Initiate/Maintain alarm  - Obtain necessary fall risk management equipment:   - Apply yellow socks and bracelet for high fall risk patients  - Consider moving patient to room near nurses station  Outcome: Progressing  Goal: Maintain or return to baseline ADL function  Description: INTERVENTIONS:  -  Assess patient's ability to carry out ADLs; assess patient's baseline for ADL function and identify physical deficits which impact ability to perform ADLs (bathing, care of mouth/teeth, toileting, grooming, dressing, etc )  - Assess/evaluate cause of self-care deficits   - Assess range of motion  - Assess patient's mobility; develop plan if impaired  - Assess patient's need for assistive devices and provide as appropriate  - Encourage maximum independence but intervene and supervise when necessary  - Involve family in performance of ADLs  - Assess for home care needs following discharge   - Consider OT consult to assist with ADL evaluation and planning for discharge  - Provide patient education as appropriate  Outcome: Progressing  Goal: Maintains/Returns to pre admission functional level  Description: INTERVENTIONS:  - Perform BMAT or MOVE assessment daily    - Set and communicate daily mobility goal to care team and patient/family/caregiver  - Collaborate with rehabilitation services on mobility goals if consulted  - Perform Range of Motion  times a day  - Reposition patient every  hours    - Dangle patient  times a day  - Stand patient  times a day  - Ambulate patient  times a day  - Out of bed to chair  times a day   - Out of bed for meals  times a day  - Out of bed for toileting  - Record patient progress and toleration of activity level   Outcome: Progressing     Problem: DISCHARGE PLANNING  Goal: Discharge to home or other facility with appropriate resources  Description: INTERVENTIONS:  - Identify barriers to discharge w/patient and caregiver  - Arrange for needed discharge resources and transportation as appropriate  - Identify discharge learning needs (meds, wound care, etc )  - Arrange for interpretive services to assist at discharge as needed  - Refer to Case Management Department for coordinating discharge planning if the patient needs post-hospital services based on physician/advanced practitioner order or complex needs related to functional status, cognitive ability, or social support system  Outcome: Progressing     Problem: POSTPARTUM  Goal: Experiences normal postpartum course  Description: INTERVENTIONS:  - Monitor maternal vital signs  - Assess uterine involution and lochia  Outcome: Progressing  Goal: Appropriate maternal -  bonding  Description: INTERVENTIONS:  - Identify family support  - Assess for appropriate maternal/infant bonding   -Encourage maternal/infant bonding opportunities  - Referral to  or  as needed  Outcome: Progressing  Goal: Establishment of infant feeding pattern  Description: INTERVENTIONS:  - Assess breast/bottle feeding  - Refer to lactation as needed  Outcome: Progressing  Goal: Incision(s), wounds(s) or drain site(s) healing without S/S of infection  Description: INTERVENTIONS  - Assess and document dressing, incision, wound bed, drain sites and surrounding tissue  - Provide patient and family education  - Perform skin care/dressing changes every   Outcome: Progressing

## 2023-05-22 NOTE — PLAN OF CARE
Problem: PAIN - ADULT  Goal: Verbalizes/displays adequate comfort level or baseline comfort level  Description: Interventions:  - Encourage patient to monitor pain and request assistance  - Assess pain using appropriate pain scale  - Administer analgesics based on type and severity of pain and evaluate response  - Implement non-pharmacological measures as appropriate and evaluate response  - Consider cultural and social influences on pain and pain management  - Notify physician/advanced practitioner if interventions unsuccessful or patient reports new pain  Outcome: Progressing     Problem: INFECTION - ADULT  Goal: Absence or prevention of progression during hospitalization  Description: INTERVENTIONS:  - Assess and monitor for signs and symptoms of infection  - Monitor lab/diagnostic results  - Monitor all insertion sites, i e  indwelling lines, tubes, and drains  - Monitor endotracheal if appropriate and nasal secretions for changes in amount and color  - [place] appropriate cooling/warming therapies per order  - Administer medications as ordered  - Instruct and encourage patient and family to use good hand hygiene technique  - Identify and instruct in appropriate isolation precautions for identified infection/condition  Outcome: Progressing  Goal: Absence of fever/infection during neutropenic period  Description: INTERVENTIONS:  - Monitor WBC    Outcome: Progressing     Problem: SAFETY ADULT  Goal: Patient will remain free of falls  Description: INTERVENTIONS:  - Educate patient/family on patient safety including physical limitations  - Instruct patient to call for assistance with activity   - Consult OT/PT to assist with strengthening/mobility   - Keep Call bell within reach  - Keep bed low and locked with side rails adjusted as appropriate  - Keep care items and personal belongings within reach  - Initiate and maintain comfort rounds  - Apply yellow socks and bracelet for high fall risk patients  - Consider moving patient to room near nurses station  Outcome: Progressing  Goal: Maintain or return to baseline ADL function  Description: INTERVENTIONS:  -  Assess patient's ability to carry out ADLs; assess patient's baseline for ADL function and identify physical deficits which impact ability to perform ADLs (bathing, care of mouth/teeth, toileting, grooming, dressing, etc )  - Assess/evaluate cause of self-care deficits   - Assess range of motion  - Assess patient's mobility; develop plan if impaired  - Assess patient's need for assistive devices and provide as appropriate  - Encourage maximum independence but intervene and supervise when necessary  - Involve family in performance of ADLs  - Assess for home care needs following discharge   - Consider OT consult to assist with ADL evaluation and planning for discharge  - Provide patient education as appropriate  Outcome: Progressing  Goal: Maintains/Returns to pre admission functional level  Description: INTERVENTIONS:  - Perform BMAT or MOVE assessment daily    - Set and communicate daily mobility goal to care team and patient/family/caregiver     - Collaborate with rehabilitation services on mobility goals if consulted  - Out of bed for toileting  - Record patient progress and toleration of activity level   Outcome: Progressing     Problem: DISCHARGE PLANNING  Goal: Discharge to home or other facility with appropriate resources  Description: INTERVENTIONS:  - Identify barriers to discharge w/patient and caregiver  - Arrange for needed discharge resources and transportation as appropriate  - Identify discharge learning needs (meds, wound care, etc )  - Arrange for interpretive services to assist at discharge as needed  - Refer to Case Management Department for coordinating discharge planning if the patient needs post-hospital services based on physician/advanced practitioner order or complex needs related to functional status, cognitive ability, or social support system  Outcome: Progressing     Problem: POSTPARTUM  Goal: Experiences normal postpartum course  Description: INTERVENTIONS:  - Monitor maternal vital signs  - Assess uterine involution and lochia  Outcome: Progressing  Goal: Appropriate maternal -  bonding  Description: INTERVENTIONS:  - Identify family support  - Assess for appropriate maternal/infant bonding   -Encourage maternal/infant bonding opportunities  - Referral to  or  as needed  Outcome: Progressing  Goal: Establishment of infant feeding pattern  Description: INTERVENTIONS:  - Assess breast/bottle feeding  - Refer to lactation as needed  Outcome: Progressing  Goal: Incision(s), wounds(s) or drain site(s) healing without S/S of infection  Description: INTERVENTIONS  - Assess and document dressing, incision, wound bed, drain sites and surrounding tissue  - Provide patient and family education    Outcome: Progressing

## 2023-05-22 NOTE — PLAN OF CARE
Problem: PAIN - ADULT  Goal: Verbalizes/displays adequate comfort level or baseline comfort level  Description: Interventions:  - Encourage patient to monitor pain and request assistance  - Assess pain using appropriate pain scale  - Administer analgesics based on type and severity of pain and evaluate response  - Implement non-pharmacological measures as appropriate and evaluate response  - Consider cultural and social influences on pain and pain management  - Notify physician/advanced practitioner if interventions unsuccessful or patient reports new pain  Outcome: Progressing     Problem: INFECTION - ADULT  Goal: Absence or prevention of progression during hospitalization  Description: INTERVENTIONS:  - Assess and monitor for signs and symptoms of infection  - Monitor lab/diagnostic results  - Monitor all insertion sites, i e  indwelling lines, tubes, and drains  - Monitor endotracheal if appropriate and nasal secretions for changes in amount and color  - [place] appropriate cooling/warming therapies per order  - Administer medications as ordered  - Instruct and encourage patient and family to use good hand hygiene technique  - Identify and instruct in appropriate isolation precautions for identified infection/condition  Outcome: Progressing  Goal: Absence of fever/infection during neutropenic period  Description: INTERVENTIONS:  - Monitor WBC    Outcome: Progressing     Problem: SAFETY ADULT  Goal: Patient will remain free of falls  Description: INTERVENTIONS:  - Educate patient/family on patient safety including physical limitations  - Instruct patient to call for assistance with activity   - Consult OT/PT to assist with strengthening/mobility   - Keep Call bell within reach  - Keep bed low and locked with side rails adjusted as appropriate  - Keep care items and personal belongings within reach  - Initiate and maintain comfort rounds  - Make Fall Risk Sign visible to staff  - Offer Toileting every  Hours, in advance of need  - Initiate/Maintain alarm  - Obtain necessary fall risk management equipment:   - Apply yellow socks and bracelet for high fall risk patients  - Consider moving patient to room near nurses station  Outcome: Progressing  Goal: Maintain or return to baseline ADL function  Description: INTERVENTIONS:  -  Assess patient's ability to carry out ADLs; assess patient's baseline for ADL function and identify physical deficits which impact ability to perform ADLs (bathing, care of mouth/teeth, toileting, grooming, dressing, etc )  - Assess/evaluate cause of self-care deficits   - Assess range of motion  - Assess patient's mobility; develop plan if impaired  - Assess patient's need for assistive devices and provide as appropriate  - Encourage maximum independence but intervene and supervise when necessary  - Involve family in performance of ADLs  - Assess for home care needs following discharge   - Consider OT consult to assist with ADL evaluation and planning for discharge  - Provide patient education as appropriate  Outcome: Progressing  Goal: Maintains/Returns to pre admission functional level  Description: INTERVENTIONS:  - Perform BMAT or MOVE assessment daily    - Set and communicate daily mobility goal to care team and patient/family/caregiver  - Collaborate with rehabilitation services on mobility goals if consulted  - Perform Range of Motion  times a day  - Reposition patient every hours    - Dangle patient  times a day  - Stand patient  times a day  - Ambulate patient  times a day  - Out of bed to chair  times a day   - Out of bed for meals  times a day  - Out of bed for toileting  - Record patient progress and toleration of activity level   Outcome: Progressing     Problem: DISCHARGE PLANNING  Goal: Discharge to home or other facility with appropriate resources  Description: INTERVENTIONS:  - Identify barriers to discharge w/patient and caregiver  - Arrange for needed discharge resources and transportation as appropriate  - Identify discharge learning needs (meds, wound care, etc )  - Arrange for interpretive services to assist at discharge as needed  - Refer to Case Management Department for coordinating discharge planning if the patient needs post-hospital services based on physician/advanced practitioner order or complex needs related to functional status, cognitive ability, or social support system  Outcome: Progressing     Problem: POSTPARTUM  Goal: Experiences normal postpartum course  Description: INTERVENTIONS:  - Monitor maternal vital signs  - Assess uterine involution and lochia  Outcome: Progressing  Goal: Appropriate maternal -  bonding  Description: INTERVENTIONS:  - Identify family support  - Assess for appropriate maternal/infant bonding   -Encourage maternal/infant bonding opportunities  - Referral to  or  as needed  Outcome: Progressing  Goal: Establishment of infant feeding pattern  Description: INTERVENTIONS:  - Assess breast/bottle feeding  - Refer to lactation as needed  Outcome: Progressing  Goal: Incision(s), wounds(s) or drain site(s) healing without S/S of infection  Description: INTERVENTIONS  - Assess and document dressing, incision, wound bed, drain sites and surrounding tissue  - Provide patient and family education  - Perform skin care/dressing changes every  Outcome: Progressing     Problem: ALTERATION IN THE BREAST  Goal: Optimize infant feeding at the breast  Description: INTERVENTIONS:  - Latch, breast and nipple assessment  - Assess prior breast feeding history  - Hand expression of breast milk  - For cracked, bleeding and or sore nipples reassess latch, treat damaged nipple  -Educate mother on feeding cues  -Positioning/latch techniques  Outcome: Progressing     Problem: INADEQUATE LATCH, SUCK OR SWALLOW  Goal: Demonstrate ability to latch and sustain latch, audible swallowing and satiety  Description: INTERVENTIONS:  - Assess oral anatomy, notify Physician/AP for abnormal findings  - Establish milk expression  - Maximize feeding opportunity (skin to skin, behavioral state)  - Position/latch techniques  - Discourage use of pacifier-artificial nipple  - Mechanical pumping  - Nipple Shield  - Supplemental formula feeding (Physician/AP order)  - Alternative feeding method  Outcome: Progressing

## 2023-05-22 NOTE — LACTATION NOTE
Met with Maggie Baltazar and provided her with and discussed Ready Set Baby,hand expression and increasing supply for NICU baby handouts  Reviewed pumping log and expectations for pumping output in the first week  Reviewed cycle pumping and appropriate pump settings, as well as pumping for 10-15 min 8-12 times per day  Encouraged  Mom to discuss putting baby to the breast with the NICU team when baby is medically stable to do so  Encouraged her to call for lactation support as needed throughout her stay  She has a Breast Pump for home

## 2023-05-22 NOTE — PROGRESS NOTES
"Progress Note - OB/GYN  Sunshine Andrade 27 y o  female MRN: [id number]  Unit/Bed#: -01 Encounter: [id number]    Assessment and Plan     Sunshine Andrade is a patient of: [address] [place] [address]  She is PPD# 2 s/p  primary  section, low transverse incision  Recovering well and is stable       Rh negative status during pregnancy  Assessment & Plan  RhoGAM ordered    * Status post primary low transverse  section  Assessment & Plan  , Hgb 10 4 --> 8 9, venofer given   Lines: pruitt removed, passed void trial   Pain: Tylenol and toradol scheduled, jennifer 5/10 PRN    FEN: Tolerating regular diet  DVT ppx: SCDs and Lovenox 40mg qD  Passing flatus   Incision C/D/I           Disposition    - Anticipate discharge home on PPD# 2-4      Subjective/Objective     Chief Complaint: Postpartum State     Subjective:    Sunshine Adnrade is PPD/POD#2 s/p  primary  section, low transverse incision  She has no current complaints  Pain is well controlled  Patient is currently voiding  She is ambulating  Patient is currently passing flatus and has had no bowel movement  She is tolerating PO, and denies nausea or vomitting  Patient denies fever, chills, chest pain, shortness of breath, or calf tenderness  Lochia is minimal  She is  Breastfeeding  She is recovering well and is stable         Vitals:   /60 (BP Location: Right arm)   Pulse 66   Temp 97 5 °F (36 4 °C) (Oral)   Resp 18   Ht 5' 5\" (1 651 m)   Wt 109 kg (240 lb)   LMP 2022   SpO2 96%   Breastfeeding Yes   BMI 39 94 kg/m²       Intake/Output Summary (Last 24 hours) at 2023 0606  Last data filed at 2023 0930  Gross per 24 hour   Intake --   Output 625 ml   Net -625 ml       Invasive Devices     Peripheral Intravenous Line  Duration           Peripheral IV 23 Right Antecubital 1 day                Physical Exam:   GEN: Sunshine Andrade appears well, alert and oriented x 3, pleasant " and cooperative   CARDIO: RRR, no murmurs or rubs  RESP:  CTAB, no wheezes or rales  ABDOMEN: soft, no tenderness, no distention, fundus @ U-2, Incision C/D/I  EXTREMITIES: SCDs on, non tender, no erythema, b/l Bernard's sign negative      Labs:     Hemoglobin   Date Value Ref Range Status   05/21/2023 8 9 (L) 11 5 - 15 4 g/dL Final   05/20/2023 10 4 (L) 11 5 - 15 4 g/dL Final     WBC   Date Value Ref Range Status   05/21/2023 19 15 (H) 4 31 - 10 16 Thousand/uL Final   05/20/2023 16 87 (H) 4 31 - 10 16 Thousand/uL Final     Platelets   Date Value Ref Range Status   05/21/2023 185 149 - 390 Thousands/uL Final   05/20/2023 189 149 - 390 Thousands/uL Final     Creatinine   Date Value Ref Range Status   05/19/2023 0 55 (L) 0 60 - 1 30 mg/dL Final     Comment:     Standardized to IDMS reference method     AST   Date Value Ref Range Status   05/19/2023 18 13 - 39 U/L Final     ALT   Date Value Ref Range Status   05/19/2023 14 7 - 52 U/L Final     Comment:     Specimen collection should occur prior to Sulfasalazine administration due to the potential for falsely depressed results             Satish Stanford MD  5/22/2023  6:06 AM

## 2023-05-23 VITALS
TEMPERATURE: 97.9 F | DIASTOLIC BLOOD PRESSURE: 80 MMHG | BODY MASS INDEX: 39.99 KG/M2 | WEIGHT: 240 LBS | HEART RATE: 65 BPM | HEIGHT: 65 IN | SYSTOLIC BLOOD PRESSURE: 140 MMHG | OXYGEN SATURATION: 98 % | RESPIRATION RATE: 18 BRPM

## 2023-05-23 RX ORDER — OXYCODONE HYDROCHLORIDE 10 MG/1
10 TABLET ORAL EVERY 4 HOURS PRN
Qty: 5 TABLET | Refills: 0 | Status: SHIPPED | OUTPATIENT
Start: 2023-05-23 | End: 2023-06-02

## 2023-05-23 RX ORDER — MEDROXYPROGESTERONE ACETATE 150 MG/ML
150 INJECTION, SUSPENSION INTRAMUSCULAR ONCE
Status: DISCONTINUED | OUTPATIENT
Start: 2023-05-23 | End: 2023-05-23 | Stop reason: HOSPADM

## 2023-05-23 RX ORDER — MEDROXYPROGESTERONE ACETATE 150 MG/ML
150 INJECTION, SUSPENSION INTRAMUSCULAR
Qty: 1 ML | Refills: 0 | Status: SHIPPED | OUTPATIENT
Start: 2023-05-23

## 2023-05-23 RX ORDER — MEDROXYPROGESTERONE ACETATE 150 MG/ML
150 INJECTION, SUSPENSION INTRAMUSCULAR ONCE
Status: DISCONTINUED | OUTPATIENT
Start: 2023-05-23 | End: 2023-05-23 | Stop reason: SDUPTHER

## 2023-05-23 RX ORDER — IBUPROFEN 600 MG/1
600 TABLET ORAL EVERY 6 HOURS
Qty: 30 TABLET | Refills: 0 | Status: SHIPPED | OUTPATIENT
Start: 2023-05-23

## 2023-05-23 RX ORDER — ACETAMINOPHEN 325 MG/1
975 TABLET ORAL EVERY 6 HOURS
Refills: 0
Start: 2023-05-23

## 2023-05-23 RX ADMIN — ACETAMINOPHEN 975 MG: 325 TABLET ORAL at 06:19

## 2023-05-23 RX ADMIN — DOCUSATE SODIUM 100 MG: 100 CAPSULE, LIQUID FILLED ORAL at 09:40

## 2023-05-23 RX ADMIN — IBUPROFEN 600 MG: 600 TABLET, FILM COATED ORAL at 15:11

## 2023-05-23 RX ADMIN — ENOXAPARIN SODIUM 40 MG: 40 INJECTION SUBCUTANEOUS at 09:29

## 2023-05-23 RX ADMIN — IBUPROFEN 600 MG: 600 TABLET, FILM COATED ORAL at 09:39

## 2023-05-23 RX ADMIN — ACETAMINOPHEN 975 MG: 325 TABLET ORAL at 12:58

## 2023-05-23 RX ADMIN — DOCUSATE SODIUM 100 MG: 100 CAPSULE, LIQUID FILLED ORAL at 18:30

## 2023-05-23 NOTE — PLAN OF CARE
Problem: PAIN - ADULT  Goal: Verbalizes/displays adequate comfort level or baseline comfort level  Description: Interventions:  - Encourage patient to monitor pain and request assistance  - Assess pain using appropriate pain scale  - Administer analgesics based on type and severity of pain and evaluate response  - Implement non-pharmacological measures as appropriate and evaluate response  - Consider cultural and social influences on pain and pain management  - Notify physician/advanced practitioner if interventions unsuccessful or patient reports new pain  Outcome: Progressing     Problem: INFECTION - ADULT  Goal: Absence or prevention of progression during hospitalization  Description: INTERVENTIONS:  - Assess and monitor for signs and symptoms of infection  - Monitor lab/diagnostic results  - Monitor all insertion sites, i e  indwelling lines, tubes, and drains  - Monitor endotracheal if appropriate and nasal secretions for changes in amount and color  - [place] appropriate cooling/warming therapies per order  - Administer medications as ordered  - Instruct and encourage patient and family to use good hand hygiene technique  - Identify and instruct in appropriate isolation precautions for identified infection/condition  Outcome: Progressing  Goal: Absence of fever/infection during neutropenic period  Description: INTERVENTIONS:  - Monitor WBC    Outcome: Progressing     Problem: SAFETY ADULT  Goal: Patient will remain free of falls  Description: INTERVENTIONS:  - Educate patient/family on patient safety including physical limitations  - Instruct patient to call for assistance with activity   - Consult OT/PT to assist with strengthening/mobility   - Keep Call bell within reach  - Keep bed low and locked with side rails adjusted as appropriate  - Keep care items and personal belongings within reach  - Initiate and maintain comfort rounds  - Make Fall Risk Sign visible to staff  - Offer Toileting every  Hours, in advance of need  - Initiate/Maintain alarm  - Obtain necessary fall risk management equipment:   - Apply yellow socks and bracelet for high fall risk patients  - Consider moving patient to room near nurses station  Outcome: Progressing  Goal: Maintain or return to baseline ADL function  Description: INTERVENTIONS:  -  Assess patient's ability to carry out ADLs; assess patient's baseline for ADL function and identify physical deficits which impact ability to perform ADLs (bathing, care of mouth/teeth, toileting, grooming, dressing, etc )  - Assess/evaluate cause of self-care deficits   - Assess range of motion  - Assess patient's mobility; develop plan if impaired  - Assess patient's need for assistive devices and provide as appropriate  - Encourage maximum independence but intervene and supervise when necessary  - Involve family in performance of ADLs  - Assess for home care needs following discharge   - Consider OT consult to assist with ADL evaluation and planning for discharge  - Provide patient education as appropriate  Outcome: Progressing  Goal: Maintains/Returns to pre admission functional level  Description: INTERVENTIONS:  - Perform BMAT or MOVE assessment daily    - Set and communicate daily mobility goal to care team and patient/family/caregiver  - Collaborate with rehabilitation services on mobility goals if consulted  - Perform Range of Motion  times a day  - Reposition patient every  hours    - Dangle patient  times a day  - Stand patient  times a day  - Ambulate patient  times a day  - Out of bed to chair  times a day   - Out of bed for meals  times a day  - Out of bed for toileting  - Record patient progress and toleration of activity level   Outcome: Progressing     Problem: DISCHARGE PLANNING  Goal: Discharge to home or other facility with appropriate resources  Description: INTERVENTIONS:  - Identify barriers to discharge w/patient and caregiver  - Arrange for needed discharge resources and transportation as appropriate  - Identify discharge learning needs (meds, wound care, etc )  - Arrange for interpretive services to assist at discharge as needed  - Refer to Case Management Department for coordinating discharge planning if the patient needs post-hospital services based on physician/advanced practitioner order or complex needs related to functional status, cognitive ability, or social support system  Outcome: Progressing     Problem: POSTPARTUM  Goal: Experiences normal postpartum course  Description: INTERVENTIONS:  - Monitor maternal vital signs  - Assess uterine involution and lochia  Outcome: Progressing  Goal: Appropriate maternal -  bonding  Description: INTERVENTIONS:  - Identify family support  - Assess for appropriate maternal/infant bonding   -Encourage maternal/infant bonding opportunities  - Referral to  or  as needed  Outcome: Progressing  Goal: Establishment of infant feeding pattern  Description: INTERVENTIONS:  - Assess breast/bottle feeding  - Refer to lactation as needed  Outcome: Progressing  Goal: Incision(s), wounds(s) or drain site(s) healing without S/S of infection  Description: INTERVENTIONS  - Assess and document dressing, incision, wound bed, drain sites and surrounding tissue  - Provide patient and family education  - Perform skin care/dressing changes every   Outcome: Progressing     Problem: ALTERATION IN THE BREAST  Goal: Optimize infant feeding at the breast  Description: INTERVENTIONS:  - Latch, breast and nipple assessment  - Assess prior breast feeding history  - Hand expression of breast milk  - For cracked, bleeding and or sore nipples reassess latch, treat damaged nipple  -Educate mother on feeding cues  -Positioning/latch techniques  Outcome: Progressing     Problem: INADEQUATE LATCH, SUCK OR SWALLOW  Goal: Demonstrate ability to latch and sustain latch, audible swallowing and satiety  Description: INTERVENTIONS:  - Assess oral anatomy, notify Physician/AP for abnormal findings  - Establish milk expression  - Maximize feeding opportunity (skin to skin, behavioral state)  - Position/latch techniques  - Discourage use of pacifier-artificial nipple  - Mechanical pumping  - Nipple Shield  - Supplemental formula feeding (Physician/AP order)  - Alternative feeding method  Outcome: Progressing

## 2023-05-23 NOTE — LACTATION NOTE
"This note was copied from a baby's chart  Parents unsure of how breast feeding is working  Emotional support given  Verbal review of positioning infant up at chest level and starting to feed infant with nose arriving at the nipple  Then, using areolar compression to achieve a deep latch that is comfortable and exchanges optimum amounts of milk  Reminder of 2 week survival tips and outpatient support available  Parents decided to take Donor milk as bridge milk till Mom's milk \"comes in\"  Parents filled out online for 4 bottles of Donor Breast milk  Reference # [id number]    1) 341451-6(20)    2) 799670-5  (29)    3) 775044-6 (36)    4) 071171-8 (39)    Identified and set aside to  just prior to D/C Home  Staff aware  Encouraged parents to call for assistance, questions, and concerns about breastfeeding  Extension provided    "

## 2023-05-23 NOTE — LACTATION NOTE
This note was copied from a baby's chart  Discharge Lactation: mom sent baby to the nursery after midnight and bottles of DBM were fed via artificial nipple  Baby came back into the room around 6 am and has been attempting to latch to the breast for every feeding  Baby has been feeding for approx  20 min  On each breast  FOB states baby is crying, not latching well on the right breast after the left breast feeding  Mom is worried about red nipples  Upon visual breast assessment, nipples are slightly everted  Red  Mom denies any compression after feeding  Mom denies any pain on the nipple face and base  Mom denies pain on the nipples with palpation and nipple rolls  Ed  On how nipples may change with breastfeeding  Reviewed how to achieve a deep latch  Brought baby up to the right breast in cross cradle hold  Latch begins deep, becomes shallow due to mom's arm fatigue  Ed  New position  Moved mom and baby into side-lying position on the right breast  Deep latch achieved with active,  Coordinated sucking for 20 min  Once baby unlatched, baby laid next to mom and desired to get back on the breast after 7 min  Ed  On cluster feeding  Ed  On knowing when baby is getting enough  Ed  On offering both breasts at every feeding  Ed  On lanolin use between feeds to assist with first day nipple tenderness  Mom requested oral assessment  Upon oral assessment of baby, noticeable heart-shape at tip  Baby would not corporate with the rest of the assessment  Baby wanted to come back to the breast      Ed  On how to est  Milk supply  Ed  On allowing non-nutritive suck  Called baby and me and requested appt with lactation and Provider  Enc  Mix feeds at home    Reviewed d/c    Enc  To call lactation for another latch prior to d/c    Education on positioning and alignment   Mom is encouraged to:     - Bring baby up to the breast (use of pillows to elevate so baby's torso is against mom's breasts)   - Skin to skin for feedings with top hand exposed to show signs of satiation   - Chin deep into breast tissue (make baby look up to the nipple)   - nose aligned to the nipple   -Wait for wide gape, drag chin on the breast so nipple is aimed at the upper, back palate  - Cheek should be touching breast   - Deep, firm hold of baby with ear, shoulder, hip alignment    Discharge feeding plan    1  Meet early feeding cues  2  Use massage, warmth, hand expression to stimulate breasts  3  Bring baby to breast skin to skin  4  Align nipple to nose, drag nipple to chin, (move baby not breast) and bring baby to breast when mouth is wide and deep latch is achieved  (Scan QR code for [facility] - positions)  5  Use breast compressions to stimulate suck  6  Once baby does not suck with stimulation, becomes fussy, or un-latches feed expressed milk via paced bottle feeding method  Review Mil[employer] on youtube or scan QR code for [employer] video  7  Bring baby back to opposite breast for non-nutritive suck and skin to skin  8  Pump after each feed to stimulate breasts and have expressed milk for next feed       Talia 7 - positions    To help your nipples heal, in addition to paying close attention to latch and positioning, apply protective ointment after feeding or pumping and cover with an occlusive dressing  Provided education on growth spurts, when to introduce bottles; paced bottle feeding, and non-nutritive suck at the breast  Provided education on Signs of satiation  Encouraged to call lactation to observe a latch prior to discharge for reassurance  Encouraged to call baby and me with any questions and closely monitor output  Encouraged parents to call for assistance, questions, and concerns about breastfeeding  Extension provided

## 2023-05-23 NOTE — PLAN OF CARE
Problem: PAIN - ADULT  Goal: Verbalizes/displays adequate comfort level or baseline comfort level  Description: Interventions:  - Encourage patient to monitor pain and request assistance  - Assess pain using appropriate pain scale  - Administer analgesics based on type and severity of pain and evaluate response  - Implement non-pharmacological measures as appropriate and evaluate response  - Consider cultural and social influences on pain and pain management  - Notify physician/advanced practitioner if interventions unsuccessful or patient reports new pain  Outcome: Progressing     Problem: INFECTION - ADULT  Goal: Absence or prevention of progression during hospitalization  Description: INTERVENTIONS:  - Assess and monitor for signs and symptoms of infection  - Monitor lab/diagnostic results  - Monitor all insertion sites, i e  indwelling lines, tubes, and drains  - Monitor endotracheal if appropriate and nasal secretions for changes in amount and color  - [place] appropriate cooling/warming therapies per order  - Administer medications as ordered  - Instruct and encourage patient and family to use good hand hygiene technique  - Identify and instruct in appropriate isolation precautions for identified infection/condition  Outcome: Progressing  Goal: Absence of fever/infection during neutropenic period  Description: INTERVENTIONS:  - Monitor WBC    Outcome: Progressing     Problem: SAFETY ADULT  Goal: Patient will remain free of falls  Description: INTERVENTIONS:  - Educate patient/family on patient safety including physical limitations  - Instruct patient to call for assistance with activity   - Consult OT/PT to assist with strengthening/mobility   - Keep Call bell within reach  - Keep bed low and locked with side rails adjusted as appropriate  - Keep care items and personal belongings within reach  - Initiate and maintain comfort rounds  - Make Fall Risk Sign visible to staff  - Offer Toileting every  Hours, in advance of need  - Initiate/Maintain alarm  - Obtain necessary fall risk management equipment:   - Apply yellow socks and bracelet for high fall risk patients  - Consider moving patient to room near nurses station  Outcome: Progressing  Goal: Maintain or return to baseline ADL function  Description: INTERVENTIONS:  -  Assess patient's ability to carry out ADLs; assess patient's baseline for ADL function and identify physical deficits which impact ability to perform ADLs (bathing, care of mouth/teeth, toileting, grooming, dressing, etc )  - Assess/evaluate cause of self-care deficits   - Assess range of motion  - Assess patient's mobility; develop plan if impaired  - Assess patient's need for assistive devices and provide as appropriate  - Encourage maximum independence but intervene and supervise when necessary  - Involve family in performance of ADLs  - Assess for home care needs following discharge   - Consider OT consult to assist with ADL evaluation and planning for discharge  - Provide patient education as appropriate  Outcome: Progressing  Goal: Maintains/Returns to pre admission functional level  Description: INTERVENTIONS:  - Perform BMAT or MOVE assessment daily    - Set and communicate daily mobility goal to care team and patient/family/caregiver  - Collaborate with rehabilitation services on mobility goals if consulted  - Perform Range of Motion  times a day  - Reposition patient every  hours    - Dangle patient  times a day  - Stand patient  times a day  - Ambulate patient  times a day  - Out of bed to chair  times a day   - Out of bed for meals  times a day  - Out of bed for toileting  - Record patient progress and toleration of activity level   Outcome: Progressing     Problem: DISCHARGE PLANNING  Goal: Discharge to home or other facility with appropriate resources  Description: INTERVENTIONS:  - Identify barriers to discharge w/patient and caregiver  - Arrange for needed discharge resources and transportation as appropriate  - Identify discharge learning needs (meds, wound care, etc )  - Arrange for interpretive services to assist at discharge as needed  - Refer to Case Management Department for coordinating discharge planning if the patient needs post-hospital services based on physician/advanced practitioner order or complex needs related to functional status, cognitive ability, or social support system  Outcome: Progressing     Problem: POSTPARTUM  Goal: Experiences normal postpartum course  Description: INTERVENTIONS:  - Monitor maternal vital signs  - Assess uterine involution and lochia  Outcome: Progressing  Goal: Appropriate maternal -  bonding  Description: INTERVENTIONS:  - Identify family support  - Assess for appropriate maternal/infant bonding   -Encourage maternal/infant bonding opportunities  - Referral to  or  as needed  Outcome: Progressing  Goal: Establishment of infant feeding pattern  Description: INTERVENTIONS:  - Assess breast/bottle feeding  - Refer to lactation as needed  Outcome: Progressing  Goal: Incision(s), wounds(s) or drain site(s) healing without S/S of infection  Description: INTERVENTIONS  - Assess and document dressing, incision, wound bed, drain sites and surrounding tissue  - Provide patient and family education  - Perform skin care/dressing changes every   Outcome: Progressing     Problem: ALTERATION IN THE BREAST  Goal: Optimize infant feeding at the breast  Description: INTERVENTIONS:  - Latch, breast and nipple assessment  - Assess prior breast feeding history  - Hand expression of breast milk  - For cracked, bleeding and or sore nipples reassess latch, treat damaged nipple  -Educate mother on feeding cues  -Positioning/latch techniques  Outcome: Progressing     Problem: INADEQUATE LATCH, SUCK OR SWALLOW  Goal: Demonstrate ability to latch and sustain latch, audible swallowing and satiety  Description: INTERVENTIONS:  - Assess oral anatomy, notify Physician/AP for abnormal findings  - Establish milk expression  - Maximize feeding opportunity (skin to skin, behavioral state)  - Position/latch techniques  - Discourage use of pacifier-artificial nipple  - Mechanical pumping  - Nipple Shield  - Supplemental formula feeding (Physician/AP order)  - Alternative feeding method  Outcome: Progressing

## 2023-05-23 NOTE — PROGRESS NOTES
"Progress Note - OB/GYN  Zack Hazel 27 y o  female MRN: [id number]  Unit/Bed#:  309-01 Encounter: [id number]    Assessment and Plan     Zack Hazel is a patient of: [address] [place] [address]  She is PPD# 2 s/p  primary  section, low transverse incision  Recovering well and is stable       * Status post primary low transverse  section  Assessment & Plan  , Hgb 10 4 --> 8 9, venofer given   Lines: pruitt removed, passed void trial   Pain: Tylenol and toradol scheduled, jennifer 5/10 PRN    FEN: Tolerating regular diet  DVT ppx: SCDs and Lovenox 40mg qD  Passing flatus   Incision C/D/I   Depo ordered        Rh negative status during pregnancy  Assessment & Plan  RhoGAM ordered      Disposition    - Anticipate discharge home on PPD# 2-4      Subjective/Objective     Chief Complaint: Postpartum State     Subjective:    Zack Hazel is PPD/POD#2 s/p  primary  section, low transverse incision  She has no current complaints  Pain is well controlled  Patient is currently voiding  She is ambulating  Patient is currently passing flatus and has had no bowel movement  She is tolerating PO, and denies nausea or vomitting  Patient denies fever, chills, chest pain, shortness of breath, or calf tenderness  Lochia is minimal  She is  Breastfeeding  She is recovering well and is stable         Vitals:   /58 (BP Location: Left arm)   Pulse 74   Temp 98 4 °F (36 9 °C) (Oral)   Resp 18   Ht 5' 5\" (1 651 m)   Wt 109 kg (240 lb)   LMP 2022   SpO2 98%   Breastfeeding Yes   BMI 39 94 kg/m²     No intake or output data in the 24 hours ending 23 0626    Invasive Devices     None                 Physical Exam:   GEN: Zack Hazel appears well, alert and oriented x 3, pleasant and cooperative   CARDIO: RRR, no murmurs or rubs  RESP:  CTAB, no wheezes or rales  ABDOMEN: soft, no tenderness, no distention, fundus @ U-2, Incision C/D/I  EXTREMITIES: " SCDs on, non tender, no erythema, b/l Bernard's sign negative      Labs:     Hemoglobin   Date Value Ref Range Status   05/21/2023 8 9 (L) 11 5 - 15 4 g/dL Final   05/20/2023 10 4 (L) 11 5 - 15 4 g/dL Final     WBC   Date Value Ref Range Status   05/21/2023 19 15 (H) 4 31 - 10 16 Thousand/uL Final   05/20/2023 16 87 (H) 4 31 - 10 16 Thousand/uL Final     Platelets   Date Value Ref Range Status   05/21/2023 185 149 - 390 Thousands/uL Final   05/20/2023 189 149 - 390 Thousands/uL Final     Creatinine   Date Value Ref Range Status   05/19/2023 0 55 (L) 0 60 - 1 30 mg/dL Final     Comment:     Standardized to IDMS reference method     AST   Date Value Ref Range Status   05/19/2023 18 13 - 39 U/L Final     ALT   Date Value Ref Range Status   05/19/2023 14 7 - 52 U/L Final     Comment:     Specimen collection should occur prior to Sulfasalazine administration due to the potential for falsely depressed results             Amarilis Taylor MD  5/23/2023  6:26 AM

## 2023-06-01 ENCOUNTER — OFFICE VISIT (OUTPATIENT)
Dept: OBGYN CLINIC | Facility: CLINIC | Age: 33
End: 2023-06-01

## 2023-06-01 VITALS
BODY MASS INDEX: 35.79 KG/M2 | HEART RATE: 87 BPM | HEIGHT: 65 IN | DIASTOLIC BLOOD PRESSURE: 87 MMHG | SYSTOLIC BLOOD PRESSURE: 127 MMHG | RESPIRATION RATE: 18 BRPM | WEIGHT: 214.8 LBS

## 2023-06-01 DIAGNOSIS — Z98.891 STATUS POST PRIMARY LOW TRANSVERSE CESAREAN SECTION: ICD-10-CM

## 2023-06-01 PROBLEM — O35.EXX0 PYELECTASIS OF FETUS ON PRENATAL ULTRASOUND: Status: RESOLVED | Noted: 2023-04-19 | Resolved: 2023-06-01

## 2023-06-01 PROBLEM — B95.1 POSITIVE GBS TEST: Status: RESOLVED | Noted: 2023-05-08 | Resolved: 2023-06-01

## 2023-06-01 NOTE — PROGRESS NOTES
"[facility]    Problem List Items Addressed This Visit        Other    Postpartum care following  delivery - Primary     Incision is healing well  Continue pelvic rest and weight bearing restrictions  Stopped expressing breast milk and breastfeeding  No breast complaints  EPDS was normal today  Plans to use DMPA for contraception - will coordinate timing of first injection at next visit  Return in 2 weeks for postpartum visit         Other Visit Diagnoses     Status post primary low transverse  section              Subjective:   Marie Cummings is a 27 y o  Beverly Huseyin here for follow up visit following 1LTCS on 23  She is doing well  She has no pain  Lochia is minimal  Bowel and bladder function are normal  She stopped breastfeeding because she was not producing as much and felt it was causing her more anxiety than she felt was healthy  Baby has seen the pediatrician  Objective:  Vitals: Blood pressure 127/87, pulse 87, resp  rate 18, height 5' 5\" (1 651 m), weight 97 4 kg (214 lb 12 8 oz), last menstrual period 2022, not currently breastfeeding  Body mass index is 35 74 kg/m²  Physical Exam  Vitals reviewed  Constitutional:       General: She is not in acute distress  Appearance: She is normal weight  She is not ill-appearing  Pulmonary:      Effort: Pulmonary effort is normal    Abdominal:      General: There is no distension  Palpations: Abdomen is soft  Tenderness: There is no abdominal tenderness  There is no guarding  Comments: Pfannenstiel incision healing well without erythema or drainage   Musculoskeletal:         General: No tenderness  Normal range of motion  Skin:     General: Skin is warm and dry  Neurological:      Mental Status: She is alert and oriented to person, place, and time  Psychiatric:         Behavior: Behavior normal          Patient discussed with Dr Dylan Coronel, " MD  PGY-III, OB/GYN  6/2/2023

## 2023-06-02 NOTE — ASSESSMENT & PLAN NOTE
Incision is healing well  Continue pelvic rest and weight bearing restrictions  Stopped expressing breast milk and breastfeeding   No breast complaints  EPDS was normal today  Plans to use DMPA for contraception - will coordinate timing of first injection at next visit  Return in 2 weeks for postpartum visit

## 2023-06-15 ENCOUNTER — POSTPARTUM VISIT (OUTPATIENT)
Dept: OBGYN CLINIC | Facility: CLINIC | Age: 33
End: 2023-06-15

## 2023-06-15 VITALS
SYSTOLIC BLOOD PRESSURE: 118 MMHG | BODY MASS INDEX: 35.42 KG/M2 | RESPIRATION RATE: 18 BRPM | HEART RATE: 80 BPM | HEIGHT: 65 IN | WEIGHT: 212.6 LBS | DIASTOLIC BLOOD PRESSURE: 83 MMHG

## 2023-06-15 DIAGNOSIS — Z30.09 GENERAL COUNSELING AND ADVICE ON FEMALE CONTRACEPTION: ICD-10-CM

## 2023-06-15 RX ORDER — MEDROXYPROGESTERONE ACETATE 150 MG/ML
150 INJECTION, SUSPENSION INTRAMUSCULAR
Qty: 1 ML | Refills: 4 | Status: SHIPPED | OUTPATIENT
Start: 2023-06-15

## 2023-06-15 NOTE — ASSESSMENT & PLAN NOTE
Negative depression screen  Continue pelvic rest and activity restrictions for 6-8 weeks  Not breastfeeding or expressing milk and has no breast complaints  Discussed importance of 18-month pregnancy interval to allow healing   Next cervical cancer screening is due 2024  Return in 1 year for annual or sooner as needed

## 2023-06-15 NOTE — PROGRESS NOTES
"[facility]    Problem List Items Addressed This Visit        Other    Postpartum care following  delivery - Primary     Negative depression screen  Continue pelvic rest and activity restrictions for 6-8 weeks  Not breastfeeding or expressing milk and has no breast complaints  Discussed importance of 18-month pregnancy interval to allow healing   Next cervical cancer screening is due   Return in 1 year for annual or sooner as needed         General counseling and advice on female contraception     Start DMPA q-12w for contraception  1 year supply was sent to her pharmacy today  She will make a nurse visit follow up for her first injection  We discussed delayed return to fertility with DMPA and that she can transition to another form of contraception such as OCPs if no contraindication int he few months prior to starting to attempt to conceive         Relevant Medications    medroxyPROGESTERone (DEPO-PROVERA) 150 mg/mL injection       Subjective:   Nahid Velazquez is a 27 y o  Ranulfo Lacks here for postpartum visit  She is doing well  Her baby girl who is fed by formula is at home with her father  She is slowly returning to normal activities  Bowel and bladder function are normal  No leg or calf pain  Lochia is light staining only  She reports her last pap smear was in  with her previous doctor and cannot recall if there was HPV testing  No history of abnormal pap smear    Objective:  Vitals: Blood pressure 118/83, pulse 80, resp  rate 18, height 5' 5\" (1 651 m), weight 96 4 kg (212 lb 9 6 oz), not currently breastfeeding  Body mass index is 35 38 kg/m²  Physical Exam  Vitals reviewed  Constitutional:       General: She is not in acute distress  Appearance: She is not ill-appearing or toxic-appearing  Pulmonary:      Effort: Pulmonary effort is normal  No respiratory distress  Abdominal:      Palpations: Abdomen is soft  Tenderness:  There is no " abdominal tenderness  There is no guarding or rebound  Musculoskeletal:         General: No swelling or tenderness  Skin:     General: Skin is warm and dry  Neurological:      Mental Status: She is alert and oriented to person, place, and time  Psychiatric:         Behavior: Behavior normal          Bailee Burnette, Pr-2  [facility]  6/15/2023

## 2023-06-15 NOTE — ASSESSMENT & PLAN NOTE
Start DMPA q-12w for contraception  1 year supply was sent to her pharmacy today  She will make a nurse visit follow up for her first injection   We discussed delayed return to fertility with DMPA and that she can transition to another form of contraception such as OCPs if no contraindication int he few months prior to starting to attempt to conceive

## 2024-03-26 NOTE — TELEPHONE ENCOUNTER
Emeterio from [facility] called and left this message .      Hi, this is Emeterio. I'm calling from [facility]. I am looking to speak with someone regarding Rivka GRANDE's YOB: 1990. Patient was seen in the emergency department and was referred to follow up with hematology oncology for an abnormal CT the abdomen. However, our provider did take a look at this information and has advised that she should see her family doctor for hospital follow up and to complete any work up that would be recommended at this time. If there is a malignancy found or a diagnosis that she would need to see hematology oncology then she would be able to be referred back to the office. But at this time, there are no findings suggestive she would need to schedule with our office at this time If you do have any questions, please give us a call [phone] and please choose the option for new patient scheduling. Thank you and have a great day.

## 2024-04-03 ENCOUNTER — OFFICE VISIT (OUTPATIENT)
Dept: INTERNAL MEDICINE CLINIC | Facility: CLINIC | Age: 34
End: 2024-04-03
Payer: COMMERCIAL

## 2024-04-03 VITALS
TEMPERATURE: 98.8 F | SYSTOLIC BLOOD PRESSURE: 120 MMHG | RESPIRATION RATE: 16 BRPM | DIASTOLIC BLOOD PRESSURE: 70 MMHG | OXYGEN SATURATION: 97 % | BODY MASS INDEX: 32.15 KG/M2 | WEIGHT: 193 LBS | HEIGHT: 65 IN | HEART RATE: 91 BPM

## 2024-04-03 DIAGNOSIS — K63.89 MESENTERIC MASS: ICD-10-CM

## 2024-04-03 DIAGNOSIS — J18.9 COMMUNITY ACQUIRED PNEUMONIA, BILATERAL: ICD-10-CM

## 2024-04-03 DIAGNOSIS — K76.9 HEPATIC LESION: ICD-10-CM

## 2024-04-03 DIAGNOSIS — F41.9 ANXIETY: ICD-10-CM

## 2024-04-03 DIAGNOSIS — R93.5 ABNORMAL COMPUTED TOMOGRAPHY ANGIOGRAPHY (CTA) OF ABDOMEN AND PELVIS: Primary | ICD-10-CM

## 2024-04-03 DIAGNOSIS — D72.829 LEUKOCYTOSIS, UNSPECIFIED TYPE: ICD-10-CM

## 2024-04-03 PROBLEM — K21.9 GASTROESOPHAGEAL REFLUX IN PREGNANCY: Status: RESOLVED | Noted: 2022-12-01 | Resolved: 2024-04-03

## 2024-04-03 PROBLEM — Z67.91 RHD NEGATIVE: Status: RESOLVED | Noted: 2023-01-25 | Resolved: 2024-04-03

## 2024-04-03 PROBLEM — O99.719: Status: RESOLVED | Noted: 2022-12-01 | Resolved: 2024-04-03

## 2024-04-03 PROBLEM — L81.1: Status: RESOLVED | Noted: 2022-12-01 | Resolved: 2024-04-03

## 2024-04-03 PROBLEM — O99.619 GASTROESOPHAGEAL REFLUX IN PREGNANCY: Status: RESOLVED | Noted: 2022-12-01 | Resolved: 2024-04-03

## 2024-04-03 PROBLEM — Z30.09 GENERAL COUNSELING AND ADVICE ON FEMALE CONTRACEPTION: Status: RESOLVED | Noted: 2023-06-15 | Resolved: 2024-04-03

## 2024-04-03 PROCEDURE — 99205 OFFICE O/P NEW HI 60 MIN: CPT | Performed by: INTERNAL MEDICINE

## 2024-04-03 NOTE — ASSESSMENT & PLAN NOTE
-briefly placed on SSRI but preferred off it so it was discontinued and self medicated with cigarettes  -offered CBT, pt to consider  -obtain labs   Chronic constipation

## 2024-04-03 NOTE — ASSESSMENT & PLAN NOTE
-noted cluster of sift tissue nodularities medial to L adrenal gland and L paravertebral region at T12, No LAD noted within abd and pelvis  -obtain CBC  -obtain MRI a/p for further evaluation

## 2024-04-03 NOTE — ASSESSMENT & PLAN NOTE
-diagnosed with multilobar BL PNA 3/21/24 in ED  -completed course of doxycycline  -has residual cough and rhinitis, lung exam clear  -will need repeat imaging in 6 weeks to check for resolution

## 2024-04-03 NOTE — ASSESSMENT & PLAN NOTE
-noted R 1.6x1.3cm enhancing lesion anteriorly and 1.2x1.1cm  hypodense lesion noted inferiorly on CTA a/p incidentally  -LFTs normal taken in ED, will repeat  -obtain MRI abdomen and pelvis

## 2024-04-03 NOTE — PROGRESS NOTES
Assessment/Plan:    Problem List Items Addressed This Visit     Anxiety     -briefly placed on SSRI but preferred off it so it was discontinued and self medicated with cigarettes  -offered CBT, pt to consider  -obtain labs         Community acquired pneumonia, bilateral     -diagnosed with multilobar BL PNA 3/21/24 in ED  -completed course of doxycycline  -has residual cough and rhinitis, lung exam clear  -will need repeat imaging in 6 weeks to check for resolution         Hepatic lesion     -noted R 1.6x1.3cm enhancing lesion anteriorly and 1.2x1.1cm  hypodense lesion noted inferiorly on CTA a/p incidentally  -LFTs normal taken in ED, will repeat  -obtain MRI abdomen and pelvis         Relevant Orders    MRI abdomen w wo contrast    Comprehensive metabolic panel    TSH, 3rd generation with Free T4 reflex    Mesenteric mass     -noted cluster of sift tissue nodularities medial to L adrenal gland and L paravertebral region at T12, No LAD noted within abd and pelvis  -obtain CBC  -obtain MRI a/p for further evaluation         Relevant Orders    MRI abdomen w wo contrast   Other Visit Diagnoses     Abnormal computed tomography angiography (CTA) of abdomen and pelvis    -  Primary    Relevant Orders    MRI abdomen w wo contrast    Leukocytosis, unspecified type        -in setting of PNA, now treated  -repeat CBC    Relevant Orders    CBC and differential    Comprehensive metabolic panel          Subjective:      Patient ID: Rivka Shepherd is a 33 y.o. female.    HPI  34yo female here as a new patient.    Took one month of antianxiety medication but did not like the way it made her feel and self medicated with cigarettes.  She is not interested in medical therapy.  Has had anxiety attacks but has attributed it to postpartum things, nothing debilitating.  She sometimes considers therapy.    She was in [facility] ED 3/21/24 for chest pain and found to have BL PNA.  She completed doxycycline x5d.   When imaging was done  "found to have cluster of soft tissue nodularities medial to L adrenal gland and in L paravertebral region at T12 level.  Also noted R hepatic enhancing lesions x2.      Still coughing, mildly productive, rhinitis.  Denies SOB, wheezing, fever, chest pain.  Intermittently she gets abdominal pain LUQ and R CVA.  Pains Has had pains since she was young and has had pyelonephritis in 2007.  Two weeks leading to her diagnosis of PNA had vomiting and lost 15pounds though she has gained 7pounds back.  Denies n/v or urinary changes.  LMP 3/17/24.      The following portions of the patient's history were reviewed and updated as appropriate: allergies, current medications, past family history, past medical history, past social history, past surgical history and problem list.    No current outpatient medications on file.    Review of Systems   Constitutional:  Positive for appetite change and unexpected weight change. Negative for fever.   HENT:  Positive for rhinorrhea.    Respiratory:  Positive for cough. Negative for shortness of breath and wheezing.    Cardiovascular:  Negative for chest pain, palpitations and leg swelling.   Gastrointestinal:  Positive for abdominal pain. Negative for constipation, diarrhea, nausea and vomiting.   Genitourinary:  Negative for difficulty urinating.   Skin:  Negative for rash.   Neurological:  Positive for dizziness. Negative for headaches.   Psychiatric/Behavioral:  The patient is nervous/anxious.          Objective:    /70   Pulse 91   Temp 98.8 °F (37.1 °C)   Resp 16   Ht 5' 5\" (1.651 m)   Wt 87.5 kg (193 lb)   SpO2 97%   BMI 32.12 kg/m²      Physical Exam  Vitals reviewed.   Constitutional:       General: She is not in acute distress.  HENT:      Head: Normocephalic.      Nose: Nose normal.      Mouth/Throat:      Mouth: Mucous membranes are moist.   Cardiovascular:      Rate and Rhythm: Normal rate and regular rhythm.      Pulses: Normal pulses.      Heart sounds: Normal " heart sounds.   Pulmonary:      Effort: Pulmonary effort is normal. No respiratory distress.      Breath sounds: Normal breath sounds. No wheezing.   Abdominal:      General: Bowel sounds are normal. There is no distension.      Palpations: Abdomen is soft.      Tenderness: There is abdominal tenderness in the left upper quadrant. There is no right CVA tenderness or left CVA tenderness.   Musculoskeletal:      Cervical back: Neck supple. No tenderness.      Right lower leg: No edema.      Left lower leg: No edema.   Lymphadenopathy:      Cervical: No cervical adenopathy.   Skin:     Coloration: Skin is not pale.      Findings: No rash.   Neurological:      General: No focal deficit present.      Mental Status: She is alert and oriented to person, place, and time.   Psychiatric:         Attention and Perception: Attention normal.         Mood and Affect: Mood is anxious.           ER records from [facility] 3/21/24 were reviewed and discussed with patient.    I have spent a total time of 60 minutes on 04/03/24 in caring for this patient including Diagnostic results, Prognosis, Risks and benefits of tx options, Instructions for management, Patient and family education, Importance of tx compliance, Risk factor reductions, Impressions, Counseling / Coordination of care, Documenting in the medical record, Reviewing / ordering tests, medicine, procedures  , and Obtaining or reviewing history  .

## 2024-04-22 ENCOUNTER — HOSPITAL ENCOUNTER (OUTPATIENT)
Dept: RADIOLOGY | Facility: HOSPITAL | Age: 34
Discharge: HOME | End: 2024-04-22
Payer: COMMERCIAL

## 2024-04-22 DIAGNOSIS — K76.9 HEPATIC LESION: ICD-10-CM

## 2024-04-22 DIAGNOSIS — K63.89 MESENTERIC MASS: ICD-10-CM

## 2024-04-22 DIAGNOSIS — R93.5 ABNORMAL COMPUTED TOMOGRAPHY ANGIOGRAPHY (CTA) OF ABDOMEN AND PELVIS: ICD-10-CM

## 2024-04-22 PROCEDURE — 74183 MRI ABD W/O CNTR FLWD CNTR: CPT

## 2024-04-22 PROCEDURE — A9585 GADOBUTROL INJECTION: HCPCS | Performed by: INTERNAL MEDICINE

## 2024-04-22 RX ORDER — GADOBUTROL 604.72 MG/ML
8 INJECTION INTRAVENOUS
Status: COMPLETED | OUTPATIENT
Start: 2024-04-22 | End: 2024-04-22

## 2024-04-22 RX ADMIN — GADOBUTROL 8 ML: 604.72 INJECTION INTRAVENOUS at 06:23

## 2024-04-24 ENCOUNTER — APPOINTMENT (OUTPATIENT)
Dept: LAB | Facility: CLINIC | Age: 34
End: 2024-04-24
Payer: COMMERCIAL

## 2024-04-24 DIAGNOSIS — K76.9 HEPATIC LESION: ICD-10-CM

## 2024-04-24 DIAGNOSIS — D72.829 LEUKOCYTOSIS, UNSPECIFIED TYPE: ICD-10-CM

## 2024-04-24 LAB
ALBUMIN SERPL BCP-MCNC: 4.1 G/DL (ref 3.5–5)
ALP SERPL-CCNC: 81 U/L (ref 34–104)
ALT SERPL W P-5'-P-CCNC: 11 U/L (ref 7–52)
ANION GAP SERPL CALCULATED.3IONS-SCNC: 6 MMOL/L (ref 4–13)
AST SERPL W P-5'-P-CCNC: 13 U/L (ref 13–39)
BASOPHILS # BLD AUTO: 0.06 THOUSANDS/ÂΜL (ref 0–0.1)
BASOPHILS NFR BLD AUTO: 1 % (ref 0–1)
BILIRUB SERPL-MCNC: 0.26 MG/DL (ref 0.2–1)
BUN SERPL-MCNC: 14 MG/DL (ref 5–25)
CALCIUM SERPL-MCNC: 8.8 MG/DL (ref 8.4–10.2)
CHLORIDE SERPL-SCNC: 103 MMOL/L (ref 96–108)
CO2 SERPL-SCNC: 29 MMOL/L (ref 21–32)
CREAT SERPL-MCNC: 0.58 MG/DL (ref 0.6–1.3)
EOSINOPHIL # BLD AUTO: 0.14 THOUSAND/ÂΜL (ref 0–0.61)
EOSINOPHIL NFR BLD AUTO: 2 % (ref 0–6)
ERYTHROCYTE [DISTWIDTH] IN BLOOD BY AUTOMATED COUNT: 16.8 % (ref 11.6–15.1)
GFR SERPL CREATININE-BSD FRML MDRD: 119 ML/MIN/1.73SQ M
GLUCOSE P FAST SERPL-MCNC: 87 MG/DL (ref 65–99)
HCT VFR BLD AUTO: 39.4 % (ref 34.8–46.1)
HGB BLD-MCNC: 11.9 G/DL (ref 11.5–15.4)
IMM GRANULOCYTES # BLD AUTO: 0.02 THOUSAND/UL (ref 0–0.2)
IMM GRANULOCYTES NFR BLD AUTO: 0 % (ref 0–2)
LYMPHOCYTES # BLD AUTO: 3.32 THOUSANDS/ÂΜL (ref 0.6–4.47)
LYMPHOCYTES NFR BLD AUTO: 41 % (ref 14–44)
MCH RBC QN AUTO: 24.5 PG (ref 26.8–34.3)
MCHC RBC AUTO-ENTMCNC: 30.2 G/DL (ref 31.4–37.4)
MCV RBC AUTO: 81 FL (ref 82–98)
MONOCYTES # BLD AUTO: 0.64 THOUSAND/ÂΜL (ref 0.17–1.22)
MONOCYTES NFR BLD AUTO: 8 % (ref 4–12)
NEUTROPHILS # BLD AUTO: 3.9 THOUSANDS/ÂΜL (ref 1.85–7.62)
NEUTS SEG NFR BLD AUTO: 48 % (ref 43–75)
NRBC BLD AUTO-RTO: 0 /100 WBCS
PLATELET # BLD AUTO: 308 THOUSANDS/UL (ref 149–390)
PMV BLD AUTO: 10.5 FL (ref 8.9–12.7)
POTASSIUM SERPL-SCNC: 3.9 MMOL/L (ref 3.5–5.3)
PROT SERPL-MCNC: 6.9 G/DL (ref 6.4–8.4)
RBC # BLD AUTO: 4.85 MILLION/UL (ref 3.81–5.12)
SODIUM SERPL-SCNC: 138 MMOL/L (ref 135–147)
WBC # BLD AUTO: 8.08 THOUSAND/UL (ref 4.31–10.16)

## 2024-04-24 PROCEDURE — 85025 COMPLETE CBC W/AUTO DIFF WBC: CPT

## 2024-04-24 PROCEDURE — 36415 COLL VENOUS BLD VENIPUNCTURE: CPT

## 2024-04-24 PROCEDURE — 80053 COMPREHEN METABOLIC PANEL: CPT

## 2024-04-25 ENCOUNTER — OFFICE VISIT (OUTPATIENT)
Dept: INTERNAL MEDICINE CLINIC | Facility: CLINIC | Age: 34
End: 2024-04-25
Payer: COMMERCIAL

## 2024-04-25 VITALS
RESPIRATION RATE: 15 BRPM | HEIGHT: 65 IN | HEART RATE: 63 BPM | WEIGHT: 196 LBS | TEMPERATURE: 98.1 F | SYSTOLIC BLOOD PRESSURE: 120 MMHG | BODY MASS INDEX: 32.65 KG/M2 | OXYGEN SATURATION: 99 % | DIASTOLIC BLOOD PRESSURE: 74 MMHG

## 2024-04-25 DIAGNOSIS — R93.5 ABNORMAL MRI OF ABDOMEN: ICD-10-CM

## 2024-04-25 DIAGNOSIS — K76.9 HEPATIC LESION: ICD-10-CM

## 2024-04-25 DIAGNOSIS — J18.9 COMMUNITY ACQUIRED PNEUMONIA, BILATERAL: ICD-10-CM

## 2024-04-25 DIAGNOSIS — R71.8 MICROCYTOSIS: ICD-10-CM

## 2024-04-25 DIAGNOSIS — R19.00 RETROPERITONEAL MASS: Primary | ICD-10-CM

## 2024-04-25 PROCEDURE — 99214 OFFICE O/P EST MOD 30 MIN: CPT | Performed by: INTERNAL MEDICINE

## 2024-04-25 NOTE — ASSESSMENT & PLAN NOTE
-dx multilobar BL PNA 3/21/24 with completion of doxycycline  -she is asymptomatic  -obtain CXR to check for resolution

## 2024-04-25 NOTE — PROGRESS NOTES
Assessment/Plan:    Problem List Items Addressed This Visit     Community acquired pneumonia, bilateral     -dx multilobar BL PNA 3/21/24 with completion of doxycycline  -she is asymptomatic  -obtain CXR to check for resolution         Relevant Orders    XR chest pa & lateral    Hepatic lesion     -noted as 0.9cm R lobe hemangioma on MRI abd  -LFTs normal  -monitor         Retroperitoneal mass - Primary     -4.6cmx5.7cmx2.0cm oval shaped mass in L retroperitoneum vs clusters of nodules  -has intermittent, vague LUQ abd pain  -will refer to Surg Onc for further evaluation and possible tissue biopsy         Relevant Orders    Ambulatory Referral to Surgical Oncology    Microcytosis     -in pt with menorrhagia  -h/h normal  -will follow         Relevant Orders    CBC and differential   Other Visit Diagnoses     Abnormal MRI of abdomen        Relevant Orders    Ambulatory Referral to Surgical Oncology            Subjective:      Patient ID: Rivka Shepherd is a 33 y.o. female.    HPI  34yo female here for follow up of tests.    She gets twinges of abdominal pain at LUQ and also R flank.  Denies urinary complaints.  Denies unexplained fevers or weight loss.  Periods are typically heavy but regular.    The following portions of the patient's history were reviewed and updated as appropriate: allergies, current medications, past family history, past medical history, past social history, past surgical history and problem list.    No current outpatient medications on file.    Review of Systems   Constitutional:  Positive for appetite change. Negative for fever and unexpected weight change.   Respiratory:  Negative for cough, shortness of breath and wheezing.    Cardiovascular:  Negative for chest pain, palpitations and leg swelling.   Gastrointestinal:  Positive for abdominal pain.   Genitourinary:  Negative for difficulty urinating.   Neurological:  Positive for headaches. Negative for dizziness.      "    Objective:    /74 (BP Location: Right arm, Patient Position: Sitting, Cuff Size: Standard)   Pulse 63   Temp 98.1 °F (36.7 °C) (Tympanic Core)   Resp 15   Ht 5' 5\" (1.651 m)   Wt 88.9 kg (196 lb)   SpO2 99%   BMI 32.62 kg/m²      Physical Exam  Vitals reviewed.   Constitutional:       General: She is not in acute distress.     Appearance: She is obese.   Cardiovascular:      Rate and Rhythm: Normal rate and regular rhythm.      Pulses: Normal pulses.      Heart sounds: Normal heart sounds.   Pulmonary:      Effort: Pulmonary effort is normal. No respiratory distress.      Breath sounds: Normal breath sounds. No wheezing.   Abdominal:      General: Bowel sounds are normal. There is no distension.      Tenderness: There is no abdominal tenderness. There is no right CVA tenderness or left CVA tenderness.   Skin:     Findings: No rash.   Neurological:      Mental Status: She is alert and oriented to person, place, and time.   Psychiatric:         Attention and Perception: Attention normal.         Mood and Affect: Mood is anxious.           Recent Results (from the past 504 hour(s))   CBC and differential    Collection Time: 04/24/24  3:56 PM   Result Value Ref Range    WBC 8.08 4.31 - 10.16 Thousand/uL    RBC 4.85 3.81 - 5.12 Million/uL    Hemoglobin 11.9 11.5 - 15.4 g/dL    Hematocrit 39.4 34.8 - 46.1 %    MCV 81 (L) 82 - 98 fL    MCH 24.5 (L) 26.8 - 34.3 pg    MCHC 30.2 (L) 31.4 - 37.4 g/dL    RDW 16.8 (H) 11.6 - 15.1 %    MPV 10.5 8.9 - 12.7 fL    Platelets 308 149 - 390 Thousands/uL    nRBC 0 /100 WBCs    Segmented % 48 43 - 75 %    Immature Grans % 0 0 - 2 %    Lymphocytes % 41 14 - 44 %    Monocytes % 8 4 - 12 %    Eosinophils Relative 2 0 - 6 %    Basophils Relative 1 0 - 1 %    Absolute Neutrophils 3.90 1.85 - 7.62 Thousands/µL    Absolute Immature Grans 0.02 0.00 - 0.20 Thousand/uL    Absolute Lymphocytes 3.32 0.60 - 4.47 Thousands/µL    Absolute Monocytes 0.64 0.17 - 1.22 Thousand/µL    " Eosinophils Absolute 0.14 0.00 - 0.61 Thousand/µL    Basophils Absolute 0.06 0.00 - 0.10 Thousands/µL   Comprehensive metabolic panel    Collection Time: 04/24/24  3:56 PM   Result Value Ref Range    Sodium 138 135 - 147 mmol/L    Potassium 3.9 3.5 - 5.3 mmol/L    Chloride 103 96 - 108 mmol/L    CO2 29 21 - 32 mmol/L    ANION GAP 6 4 - 13 mmol/L    BUN 14 5 - 25 mg/dL    Creatinine 0.58 (L) 0.60 - 1.30 mg/dL    Glucose, Fasting 87 65 - 99 mg/dL    Calcium 8.8 8.4 - 10.2 mg/dL    AST 13 13 - 39 U/L    ALT 11 7 - 52 U/L    Alkaline Phosphatase 81 34 - 104 U/L    Total Protein 6.9 6.4 - 8.4 g/dL    Albumin 4.1 3.5 - 5.0 g/dL    Total Bilirubin 0.26 0.20 - 1.00 mg/dL    eGFR 119 ml/min/1.73sq m     MRI abdomen w wo contrast  Narrative: MRI - ABDOMEN - WITH AND WITHOUT CONTRAST    INDICATION: 33 years / Female. R93.5: Abnormal findings on diagnostic imaging of other abdominal regions, including retroperitoneum  K76.9: Liver disease, unspecified  K63.89: Other specified diseases of intestine. Abnormal abdominopelvic CT. Possible liver lesion and left retroperitoneal/paravertebral nodules    COMPARISON: 3/21/2024 abdominopelvic CT report from [facility]. Images are not available for direct comparison.    TECHNIQUE: Multiplanar/multisequence MRI of the abdomen was performed before and after administration of contrast.    IV Contrast: 8 mL of Gadobutrol injection (SINGLE-DOSE)    FINDINGS:  Mammogram:  LOWER CHEST:   Within normal limits.    LIVER:  Normal signal intensity and morphology.  No suspicious mass.  Solitary, 0.9 cm, circumscribed inferior right lobe posterior segment nodule. Typical signal intensity and enhancement of benign hemangioma (8/29  Patent hepatic and portal veins.    BILE DUCTS:  Normal caliber and morphology.    GALLBLADDER: High T1w signal intensity luminal contents suggesting sludge. No focal filling defects, wall thickening or pericholecystic fluid.    SPLEEN:  Within normal  limits.    PANCREAS:  Within normal limits. Normal pancreatic duct caliber and morphology.    ADRENAL GLANDS:  Within normal limits. Left retroperitoneal mass appears separate from the left adrenal. Please see description below.    KIDNEYS/PROXIMAL URETERS:  Within normal limits.    BOWEL:   Within normal limits.    PERITONEUM/RETROPERITONEUM:  No ascites or fluid collections.    Approximately 4.6 x 5.7 x 2.0 cm (length X depth X width) somewhat oval-shaped, masslike opacity in the left retroperitoneum, lateral to the diaphragmatic sudha and medial to the left adrenal gland (5/12 and 13/70).    This is measured today as a solitary mass. On the CT, was described as a cluster of soft tissue appearing nodularities.  Could be small several soft tissue nodule nodules  by normal retroperitoneal fat versus single mass composed of soft tissue   and fat. Soft tissue components are homogeneous, similar signal intensity to spleen, and have homogeneous enhancement, greatest on delayed phase.    LYMPH NODES: No separate lymphadenopathy in the retroperitoneum or mesentery.    VASCULAR STRUCTURES:  Within normal limits. Retroaortic left renal vein.    BONES:  No suspicious osseous lesion.    ABDOMINAL WALL:  Within normal limits.    VISUALIZED PELVIC STRUCTURES: Within normal limits.  Impression: Small, benign hepatic hemangioma. No suspicious liver mass.    Left retroperitoneal mass versus clustered nodules. Detailed description above. Solitary mass favored on this exam. Please compare to prior CT which has better spatial resolution. The differential diagnosis would include clustered lymph nodes,   extra-adrenal myelolipoma, well-differentiated liposarcoma, and primary retroperitoneal teratoma. Extramedullary hematopoiesis and hibernoma can also be considered, probably less likely.  Tissue diagnosis may be indicated.    Workstation performed: [id number]

## 2024-04-25 NOTE — ASSESSMENT & PLAN NOTE
-4.6cmx5.7cmx2.0cm oval shaped mass in L retroperitoneum vs clusters of nodules  -has intermittent, vague LUQ abd pain  -will refer to Surg Onc for further evaluation and possible tissue biopsy

## 2024-04-26 ENCOUNTER — TELEPHONE (OUTPATIENT)
Dept: HEMATOLOGY ONCOLOGY | Facility: CLINIC | Age: 34
End: 2024-04-26

## 2024-04-26 NOTE — TELEPHONE ENCOUNTER
I called Rivka in response to a referral that was received for patient to establish care with Surgical Oncology.     Outreach was made to schedule a consultation.    I left a voicemail explaining the reason for my call and advised patient to call [facility] at [phone].  Another attempt will be made to contact patient.

## 2024-05-03 PROBLEM — J18.9 COMMUNITY ACQUIRED PNEUMONIA, BILATERAL: Status: RESOLVED | Noted: 2024-04-03 | Resolved: 2024-05-03

## 2024-05-13 ENCOUNTER — CONSULT (OUTPATIENT)
Dept: SURGICAL ONCOLOGY | Facility: CLINIC | Age: 34
End: 2024-05-13
Payer: COMMERCIAL

## 2024-05-13 VITALS
WEIGHT: 186.29 LBS | DIASTOLIC BLOOD PRESSURE: 82 MMHG | SYSTOLIC BLOOD PRESSURE: 128 MMHG | OXYGEN SATURATION: 98 % | BODY MASS INDEX: 31.04 KG/M2 | HEIGHT: 65 IN | HEART RATE: 86 BPM | TEMPERATURE: 97.7 F | RESPIRATION RATE: 18 BRPM

## 2024-05-13 DIAGNOSIS — R19.00 RETROPERITONEAL MASS: Primary | ICD-10-CM

## 2024-05-13 DIAGNOSIS — R93.5 ABNORMAL MRI OF ABDOMEN: ICD-10-CM

## 2024-05-13 PROCEDURE — 99205 OFFICE O/P NEW HI 60 MIN: CPT | Performed by: STUDENT IN AN ORGANIZED HEALTH CARE EDUCATION/TRAINING PROGRAM

## 2024-05-13 RX ORDER — DOXYCYCLINE 100 MG/1
TABLET ORAL
COMMUNITY
Start: 2024-03-21

## 2024-05-13 RX ORDER — NAPROXEN 500 MG/1
500 TABLET ORAL EVERY 12 HOURS PRN
COMMUNITY
Start: 2024-03-21 | End: 2025-03-21

## 2024-05-13 NOTE — H&P (VIEW-ONLY)
Surgical Oncology Consultation    [facility] [address] [id number] [phone]    Patient:  Rivka Shepherd  1990  [id number]    Primary Care provider:  Angela Emmanuel DO  [address] [facility] [address], [address]  [facility] [address]    Referring provider:  Angela Emmanuel DO  [address] [facility]  [address]nd Floor, [place] [address]    Diagnoses and all orders for this visit:    Retroperitoneal mass  -     Ambulatory Referral to Surgical Oncology  -     Metanephrine, Fractionated Plasma Free; Future  -     Catecholamines, fractionated, plasma; Future  -     Ambulatory Referral to Interventional Radiology; Future    Abnormal MRI of abdomen  -     Ambulatory Referral to Surgical Oncology    Other orders  -     doxycycline (ADOXA) 100 MG tablet; TAKE 1 TABLET BY MOUTH 2 TIMES A DAY FOR 5 DAYS. (Patient not taking: Reported on 5/13/2024)  -     naproxen (NAPROSYN) 500 mg tablet; Take 500 mg by mouth every 12 (twelve) hours as needed (Patient not taking: Reported on 5/13/2024)        Chief Complaint   Patient presents with    Consult       Return in about 6 weeks (around 6/24/2024) for Office Visit.    Oncology History    No history exists.       History of Present Illness  :   32 yo female with new RP / paraganglioma-appearing mass. Underwent cross sectional imaging for PNA related to infant sickness recurrence. Demonstrated a superior LFT RP mass for which an MR was performed. This showed a cluster of nodules just medial to the adrenal with appearance of extra adrenal paraganglioma vs Lns vs RP fibrosis. Pt relatively asx. Complains of occasional abd pain as well as RT back pain. No signirficant GI sx. No sx c/f pheo. No fatigue, weight loss, bone pain, flushing, fevers, night sweats.     Review of Systems  Complete ROS Surg Onc:   Constitutional: The patient denies new or recent history of general fatigue, no  recent weight loss, no change in appetite.   Eyes: No complaints of visual problems, no scleral icterus.   ENT: No complaints of ear pain, no hoarseness, no difficulty swallowing,  no tinnitus and no new masses in head, oral cavity, or neck.   Cardiovascular: No complaints of chest pain, no palpitations, no ankle edema.   Respiratory: No complaints of shortness of breath, no cough.   Gastrointestinal: No complaints of jaundice, no bloody stools, no pale stools.   Genitourinary: No complaints of dysuria, no hematuria, no nocturia, no frequent urination, no urethral discharge.   Musculoskeletal: No complaints of weakness, paralysis, joint stiffness or arthralgias.  Integumentary: No complaints of rash, no new lesions.   Neurological: No complaints of convulsions, no seizures, no dizziness.   Hematologic/Lymphatic: No complaints of easy bruising.   Endocrine:  No hot or cold intolerance.  No polydipsia, polyphagia, or polyuria.  Allergy/immunology:  No environmental allergies.  No food allergies.  Not immunocompromised.      Patient Active Problem List   Diagnosis    Anxiety    Dysmenorrhea    Menorrhagia with regular cycle    Hepatic lesion    Retroperitoneal mass    Microcytosis     Past Medical History:   Diagnosis Date    Anxiety     Gastroesophageal reflux in pregnancy 2022    General counseling and advice on female contraception 06/15/2023    DMPA [facility] to [facility] with refills for 1 year    Melasma of pregnancy 2022    Migraine     Postpartum care following  delivery 2023    Pyelectasis of fetus on prenatal ultrasound 2023    Per [facility] US on 23: The left kidney shows minimal pyelectasis to 7 mm. There is no evidence for caliectasis or hydronephrosis. Recommend a  US at least 72 hrs after birth.     RhD negative 2023    s/p rhogam at 29 weeks    Varicella      Past Surgical History:   Procedure Laterality Date    COLONOSCOPY      KNEE ARTHROSCOPY Left      DE  DELIVERY ONLY N/A 2023    Procedure:  SECTION ();  Surgeon: Neena Yip MD;  Location: [facility] ;  Service: Obstetrics    TONSILLECTOMY      UPPER GASTROINTESTINAL ENDOSCOPY      WISDOM TOOTH EXTRACTION  2009     Family History   Problem Relation Age of Onset    Anxiety disorder Mother     Heart disease Father     ADD / ADHD Sister     Anxiety disorder Sister     Lymphoma Maternal Grandmother     Anxiety disorder Maternal Grandmother     Stroke Paternal Grandmother     Heart attack Paternal Grandfather     Heart disease Paternal Grandfather      Social History     Socioeconomic History    Marital status: /Civil Union     Spouse name: Not on file    Number of children: Not on file    Years of education: Not on file    Highest education level: Not on file   Occupational History    Not on file   Tobacco Use    Smoking status: Former     Current packs/day: 0.00     Types: Cigarettes     Quit date: 2022     Years since quittin.6    Smokeless tobacco: Never   Vaping Use    Vaping status: Never Used   Substance and Sexual Activity    Alcohol use: Yes     Alcohol/week: 1.0 standard drink of alcohol     Types: 1 Glasses of wine per week    Drug use: Yes     Types: Marijuana     Comment: denies IVDA    Sexual activity: Yes     Partners: Male     Birth control/protection: Condom Male   Other Topics Concern    Not on file   Social History Narrative     for Hotels and around     Social Determinants of Health     Financial Resource Strain: Low Risk  (10/27/2022)    Overall Financial Resource Strain (CARDIA)     Difficulty of Paying Living Expenses: Not hard at all   Food Insecurity: No Food Insecurity (10/27/2022)    Hunger Vital Sign     Worried About Running Out of Food in the Last Year: Never true     Ran Out of Food in the Last Year: Never true   Transportation Needs: No Transportation Needs (10/27/2022)    PRAPARE - Transportation     Lack of  Transportation (Medical): No     Lack of Transportation (Non-Medical): No   Physical Activity: Not on file   Stress: No Stress Concern Present (11/8/2022)    [employer] - Occupational Stress Questionnaire     Feeling of Stress : Only a little   Social Connections: Not on file   Intimate Partner Violence: Not At Risk (11/8/2022)    Humiliation, Afraid, Rape, and Kick questionnaire     Fear of Current or Ex-Partner: No     Emotionally Abused: No     Physically Abused: No     Sexually Abused: No   Housing Stability: Low Risk  (11/8/2022)    Housing Stability Vital Sign     Unable to Pay for Housing in the Last Year: No     Number of Places Lived in the Last Year: 1     Unstable Housing in the Last Year: No       Current Outpatient Medications:     doxycycline (ADOXA) 100 MG tablet, TAKE 1 TABLET BY MOUTH 2 TIMES A DAY FOR 5 DAYS. (Patient not taking: Reported on 5/13/2024), Disp: , Rfl:     naproxen (NAPROSYN) 500 mg tablet, Take 500 mg by mouth every 12 (twelve) hours as needed (Patient not taking: Reported on 5/13/2024), Disp: , Rfl:   No Known Allergies    Vitals:    05/13/24 0802   BP: 128/82   Pulse: 86   Resp: 18   Temp: 97.7 °F (36.5 °C)   SpO2: 98%       Physical Exam   General: Appears well, appears stated age  Skin: Warm, anicteric  HEENT: Normocephalic, atraumatic; sclera aniceteric, mucous membranes moist; cervical nodes without adenopathy  Cardiopulmonary: RRR, Easy WOB, no BLE edema  Abd: Flat and soft, nontender, no masses appreciated, no hepatosplenomegaly  MSK: Symmetric, no cyanosis, no overt weakness  Lymphatic: No cervical, axillary or inguinal lymphadenopathy  Neuro: Affect appropriate, no gross motor abnormalities      Labs: Reviewed in EPIC    Imaging  MRI abdomen w wo contrast    Result Date: 4/25/2024  Narrative: MRI - ABDOMEN - WITH AND WITHOUT CONTRAST INDICATION: 33 years / Female. R93.5: Abnormal findings on diagnostic imaging of other abdominal regions,  including retroperitoneum K76.9: Liver disease, unspecified K63.89: Other specified diseases of intestine. Abnormal abdominopelvic CT. Possible liver lesion and left retroperitoneal/paravertebral nodules COMPARISON: 3/21/2024 abdominopelvic CT report from [facility]. Images are not available for direct comparison. TECHNIQUE: Multiplanar/multisequence MRI of the abdomen was performed before and after administration of contrast. IV Contrast: 8 mL of Gadobutrol injection (SINGLE-DOSE) FINDINGS: Mammogram: LOWER CHEST:   Within normal limits. LIVER: Normal signal intensity and morphology. No suspicious mass. Solitary, 0.9 cm, circumscribed inferior right lobe posterior segment nodule. Typical signal intensity and enhancement of benign hemangioma (8/29 Patent hepatic and portal veins. BILE DUCTS:  Normal caliber and morphology. GALLBLADDER: High T1w signal intensity luminal contents suggesting sludge. No focal filling defects, wall thickening or pericholecystic fluid. SPLEEN:  Within normal limits. PANCREAS:  Within normal limits. Normal pancreatic duct caliber and morphology. ADRENAL GLANDS:  Within normal limits. Left retroperitoneal mass appears separate from the left adrenal. Please see description below. KIDNEYS/PROXIMAL URETERS:  Within normal limits. BOWEL:   Within normal limits. PERITONEUM/RETROPERITONEUM: No ascites or fluid collections. Approximately 4.6 x 5.7 x 2.0 cm (length X depth X width) somewhat oval-shaped, masslike opacity in the left retroperitoneum, lateral to the diaphragmatic sudha and medial to the left adrenal gland (5/12 and 13/70). This is measured today as a solitary mass. On the CT, was described as a cluster of soft tissue appearing nodularities.  Could be small several soft tissue nodule nodules  by normal retroperitoneal fat versus single mass composed of soft tissue and fat. Soft tissue components are homogeneous, similar signal intensity to spleen, and have  homogeneous enhancement, greatest on delayed phase. LYMPH NODES: No separate lymphadenopathy in the retroperitoneum or mesentery. VASCULAR STRUCTURES:  Within normal limits. Retroaortic left renal vein. BONES:  No suspicious osseous lesion. ABDOMINAL WALL:  Within normal limits. VISUALIZED PELVIC STRUCTURES: Within normal limits.     Impression: Small, benign hepatic hemangioma. No suspicious liver mass. Left retroperitoneal mass versus clustered nodules. Detailed description above. Solitary mass favored on this exam. Please compare to prior CT which has better spatial resolution. The differential diagnosis would include clustered lymph nodes, extra-adrenal myelolipoma, well-differentiated liposarcoma, and primary retroperitoneal teratoma. Extramedullary hematopoiesis and hibernoma can also be considered, probably less likely. Tissue diagnosis may be indicated. Workstation performed: [id number]       I independently reviewed and interpreted the available laboratory and imaging data incl CT, MR, hosp course, labs      Discussion/Summary:   34 yo female with LFT RP mass  - screening labs for extra-adrenal para / pheo  - IR bx  - RTC 6 weeks

## 2024-05-13 NOTE — PROGRESS NOTES
Surgical Oncology Consultation    [facility] [address] [id number] [phone]    Patient:  Rivka Shepherd  1990  [id number]    Primary Care provider:  Angela Emmanuel DO  [address] [facility] [address], [address]  [facility] [address]    Referring provider:  Angela Emmanuel DO  [address] [facility]  [address], [place] [address]    Diagnoses and all orders for this visit:    Retroperitoneal mass  -     Ambulatory Referral to Surgical Oncology  -     Metanephrine, Fractionated Plasma Free; Future  -     Catecholamines, fractionated, plasma; Future  -     Ambulatory Referral to Interventional Radiology; Future    Abnormal MRI of abdomen  -     Ambulatory Referral to Surgical Oncology    Other orders  -     doxycycline (ADOXA) 100 MG tablet; TAKE 1 TABLET BY MOUTH 2 TIMES A DAY FOR 5 DAYS. (Patient not taking: Reported on 5/13/2024)  -     naproxen (NAPROSYN) 500 mg tablet; Take 500 mg by mouth every 12 (twelve) hours as needed (Patient not taking: Reported on 5/13/2024)        Chief Complaint   Patient presents with    Consult       Return in about 6 weeks (around 6/24/2024) for Office Visit.    Oncology History    No history exists.       History of Present Illness  :   34 yo female with new RP / paraganglioma-appearing mass. Underwent cross sectional imaging for PNA related to infant sickness recurrence. Demonstrated a superior LFT RP mass for which an MR was performed. This showed a cluster of nodules just medial to the adrenal with appearance of extra adrenal paraganglioma vs Lns vs RP fibrosis. Pt relatively asx. Complains of occasional abd pain as well as RT back pain. No signirficant GI sx. No sx c/f pheo. No fatigue, weight loss, bone pain, flushing, fevers, night sweats.     Review of Systems  Complete ROS Surg Onc:   Constitutional: The patient denies new or recent history of general fatigue, no  recent weight loss, no change in appetite.   Eyes: No complaints of visual problems, no scleral icterus.   ENT: No complaints of ear pain, no hoarseness, no difficulty swallowing,  no tinnitus and no new masses in head, oral cavity, or neck.   Cardiovascular: No complaints of chest pain, no palpitations, no ankle edema.   Respiratory: No complaints of shortness of breath, no cough.   Gastrointestinal: No complaints of jaundice, no bloody stools, no pale stools.   Genitourinary: No complaints of dysuria, no hematuria, no nocturia, no frequent urination, no urethral discharge.   Musculoskeletal: No complaints of weakness, paralysis, joint stiffness or arthralgias.  Integumentary: No complaints of rash, no new lesions.   Neurological: No complaints of convulsions, no seizures, no dizziness.   Hematologic/Lymphatic: No complaints of easy bruising.   Endocrine:  No hot or cold intolerance.  No polydipsia, polyphagia, or polyuria.  Allergy/immunology:  No environmental allergies.  No food allergies.  Not immunocompromised.      Patient Active Problem List   Diagnosis    Anxiety    Dysmenorrhea    Menorrhagia with regular cycle    Hepatic lesion    Retroperitoneal mass    Microcytosis     Past Medical History:   Diagnosis Date    Anxiety     Gastroesophageal reflux in pregnancy 2022    General counseling and advice on female contraception 06/15/2023    DMPA sent to [facility] with refills for 1 year    Melasma of pregnancy 2022    Migraine     Postpartum care following  delivery 2023    Pyelectasis of fetus on prenatal ultrasound 2023    Per [facility] US on 23: The left kidney shows minimal pyelectasis to 7 mm. There is no evidence for caliectasis or hydronephrosis. Recommend a  US at least 72 hrs after birth.     RhD negative 2023    s/p rhogam at 29 weeks    Varicella      Past Surgical History:   Procedure Laterality Date    COLONOSCOPY      KNEE ARTHROSCOPY Left      DC  DELIVERY ONLY N/A 2023    Procedure:  SECTION ();  Surgeon: Neena Yip MD;  Location: [facility] ;  Service: Obstetrics    TONSILLECTOMY      UPPER GASTROINTESTINAL ENDOSCOPY      WISDOM TOOTH EXTRACTION  2009     Family History   Problem Relation Age of Onset    Anxiety disorder Mother     Heart disease Father     ADD / ADHD Sister     Anxiety disorder Sister     Lymphoma Maternal Grandmother     Anxiety disorder Maternal Grandmother     Stroke Paternal Grandmother     Heart attack Paternal Grandfather     Heart disease Paternal Grandfather      Social History     Socioeconomic History    Marital status: /Civil Union     Spouse name: Not on file    Number of children: Not on file    Years of education: Not on file    Highest education level: Not on file   Occupational History    Not on file   Tobacco Use    Smoking status: Former     Current packs/day: 0.00     Types: Cigarettes     Quit date: 2022     Years since quittin.6    Smokeless tobacco: Never   Vaping Use    Vaping status: Never Used   Substance and Sexual Activity    Alcohol use: Yes     Alcohol/week: 1.0 standard drink of alcohol     Types: 1 Glasses of wine per week    Drug use: Yes     Types: Marijuana     Comment: denies IVDA    Sexual activity: Yes     Partners: Male     Birth control/protection: Condom Male   Other Topics Concern    Not on file   Social History Narrative     for Hotels and around     Social Determinants of Health     Financial Resource Strain: Low Risk  (10/27/2022)    Overall Financial Resource Strain (CARDIA)     Difficulty of Paying Living Expenses: Not hard at all   Food Insecurity: No Food Insecurity (10/27/2022)    Hunger Vital Sign     Worried About Running Out of Food in the Last Year: Never true     Ran Out of Food in the Last Year: Never true   Transportation Needs: No Transportation Needs (10/27/2022)    PRAPARE - Transportation     Lack of  Transportation (Medical): No     Lack of Transportation (Non-Medical): No   Physical Activity: Not on file   Stress: No Stress Concern Present (11/8/2022)    [employer] [place] [employer] - Occupational Stress Questionnaire     Feeling of Stress : Only a little   Social Connections: Not on file   Intimate Partner Violence: Not At Risk (11/8/2022)    Humiliation, Afraid, Rape, and Kick questionnaire     Fear of Current or Ex-Partner: No     Emotionally Abused: No     Physically Abused: No     Sexually Abused: No   Housing Stability: Low Risk  (11/8/2022)    Housing Stability Vital Sign     Unable to Pay for Housing in the Last Year: No     Number of Places Lived in the Last Year: 1     Unstable Housing in the Last Year: No       Current Outpatient Medications:     doxycycline (ADOXA) 100 MG tablet, TAKE 1 TABLET BY MOUTH 2 TIMES A DAY FOR 5 DAYS. (Patient not taking: Reported on 5/13/2024), Disp: , Rfl:     naproxen (NAPROSYN) 500 mg tablet, Take 500 mg by mouth every 12 (twelve) hours as needed (Patient not taking: Reported on 5/13/2024), Disp: , Rfl:   No Known Allergies    Vitals:    05/13/24 0802   BP: 128/82   Pulse: 86   Resp: 18   Temp: 97.7 °F (36.5 °C)   SpO2: 98%       Physical Exam   General: Appears well, appears stated age  Skin: Warm, anicteric  HEENT: Normocephalic, atraumatic; sclera aniceteric, mucous membranes moist; cervical nodes without adenopathy  Cardiopulmonary: RRR, Easy WOB, no BLE edema  Abd: Flat and soft, nontender, no masses appreciated, no hepatosplenomegaly  MSK: Symmetric, no cyanosis, no overt weakness  Lymphatic: No cervical, axillary or inguinal lymphadenopathy  Neuro: Affect appropriate, no gross motor abnormalities      Labs: Reviewed in EPIC    Imaging  MRI abdomen w wo contrast    Result Date: 4/25/2024  Narrative: MRI - ABDOMEN - WITH AND WITHOUT CONTRAST INDICATION: 33 years / Female. R93.5: Abnormal findings on diagnostic imaging of other abdominal regions,  including retroperitoneum K76.9: Liver disease, unspecified K63.89: Other specified diseases of intestine. Abnormal abdominopelvic CT. Possible liver lesion and left retroperitoneal/paravertebral nodules COMPARISON: 3/21/2024 abdominopelvic CT report from [facility]. Images are not available for direct comparison. TECHNIQUE: Multiplanar/multisequence MRI of the abdomen was performed before and after administration of contrast. IV Contrast: 8 mL of Gadobutrol injection (SINGLE-DOSE) FINDINGS: Mammogram: LOWER CHEST:   Within normal limits. LIVER: Normal signal intensity and morphology. No suspicious mass. Solitary, 0.9 cm, circumscribed inferior right lobe posterior segment nodule. Typical signal intensity and enhancement of benign hemangioma (8/29 Patent hepatic and portal veins. BILE DUCTS:  Normal caliber and morphology. GALLBLADDER: High T1w signal intensity luminal contents suggesting sludge. No focal filling defects, wall thickening or pericholecystic fluid. SPLEEN:  Within normal limits. PANCREAS:  Within normal limits. Normal pancreatic duct caliber and morphology. ADRENAL GLANDS:  Within normal limits. Left retroperitoneal mass appears separate from the left adrenal. Please see description below. KIDNEYS/PROXIMAL URETERS:  Within normal limits. BOWEL:   Within normal limits. PERITONEUM/RETROPERITONEUM: No ascites or fluid collections. Approximately 4.6 x 5.7 x 2.0 cm (length X depth X width) somewhat oval-shaped, masslike opacity in the left retroperitoneum, lateral to the diaphragmatic sudha and medial to the left adrenal gland (5/12 and 13/70). This is measured today as a solitary mass. On the CT, was described as a cluster of soft tissue appearing nodularities.  Could be small several soft tissue nodule nodules  by normal retroperitoneal fat versus single mass composed of soft tissue and fat. Soft tissue components are homogeneous, similar signal intensity to spleen, and have  homogeneous enhancement, greatest on delayed phase. LYMPH NODES: No separate lymphadenopathy in the retroperitoneum or mesentery. VASCULAR STRUCTURES:  Within normal limits. Retroaortic left renal vein. BONES:  No suspicious osseous lesion. ABDOMINAL WALL:  Within normal limits. VISUALIZED PELVIC STRUCTURES: Within normal limits.     Impression: Small, benign hepatic hemangioma. No suspicious liver mass. Left retroperitoneal mass versus clustered nodules. Detailed description above. Solitary mass favored on this exam. Please compare to prior CT which has better spatial resolution. The differential diagnosis would include clustered lymph nodes, extra-adrenal myelolipoma, well-differentiated liposarcoma, and primary retroperitoneal teratoma. Extramedullary hematopoiesis and hibernoma can also be considered, probably less likely. Tissue diagnosis may be indicated. Workstation performed: [id number]       I independently reviewed and interpreted the available laboratory and imaging data incl CT, MR, hosp course, labs      Discussion/Summary:   32 yo female with LFT RP mass  - screening labs for extra-adrenal para / pheo  - IR bx  - RTC 6 weeks

## 2024-05-14 ENCOUNTER — PREP FOR PROCEDURE (OUTPATIENT)
Dept: INTERVENTIONAL RADIOLOGY/VASCULAR | Facility: CLINIC | Age: 34
End: 2024-05-14

## 2024-05-14 DIAGNOSIS — R19.00 RETROPERITONEAL MASS: Primary | ICD-10-CM

## 2024-05-14 RX ORDER — SODIUM CHLORIDE 9 MG/ML
30 INJECTION, SOLUTION INTRAVENOUS CONTINUOUS
OUTPATIENT
Start: 2024-05-14

## 2024-05-22 NOTE — PRE-PROCEDURE INSTRUCTIONS
Pre-procedure Instructions for Interventional Radiology  [address] [address]  INTERVENTIONAL RADIOLOGY [phone]    You are scheduled for a/an biopsy.    On Wednesday 5-29-24.    Your tentative arrival time is 7:30am.  Short stay will notify you the day before your procedure with the exact arrival time and the location to arrive.    To prepare for your procedure:  Please arrange for someone to drive you home after the procedure and stay with you until the next morning if you are instructed to do so.  This is typically for patients receiving some type of sedative or anesthetic for the procedure.  DO NOT EAT OR DRINK ANYTHING after midnight on the evening before your procedure including candy & gum.  ONLY SIPS OF WATER with your medications are allowed on the morning of your procedure.  TAKE ALL OF YOUR REGULAR MEDICATIONS THE MORNING OF YOUR PROCEDURE with sips of water!  We may call you to stop some of your blood sugar, blood pressure and blood thinning medications depending on the procedure.  Please take all of these medications unless we instruct you to stop them.  If you have an allergy to x-ray dye, please contact Interventional Radiology for an x-ray dye preparation which usually consists of an oral steroid and Benadryl.    The day of your procedure:  Bring a list of the medications you take at home.  Bring medications you take for breathing problems (such as inhalers), medications for chest pain, or both.  Bring a case for your glasses or contacts.  Bring your insurance card and a form of photo ID.  Please leave all valuables such as credit cards and jewelry at home.  Report to the admitting office to the left of the registration desk in the main lobby at the [facility], Entrance B.  You will then be directed to the Short Stay Center.  While your procedure is being performed, your family may wait in the Radiology Waiting Room on the 1st floor in Radiology.   if they need to leave, they may provide a number to be called following the procedure.   Be prepared to stay overnight just in case. Sometimes procedures will indicate the need for further observation or treatment.   If you are scheduled for a follow-up visit with the Interventional Radiologist after your procedure, you will be called with a date and time.    Special Instructions (Medications to stop taking before your procedure etc.):

## 2024-05-29 ENCOUNTER — HOSPITAL ENCOUNTER (OUTPATIENT)
Dept: RADIOLOGY | Facility: HOSPITAL | Age: 34
Discharge: HOME | End: 2024-05-29
Attending: RADIOLOGY
Payer: COMMERCIAL

## 2024-05-29 VITALS
HEART RATE: 60 BPM | TEMPERATURE: 98.1 F | SYSTOLIC BLOOD PRESSURE: 116 MMHG | WEIGHT: 185 LBS | OXYGEN SATURATION: 100 % | BODY MASS INDEX: 30.82 KG/M2 | HEIGHT: 65 IN | RESPIRATION RATE: 18 BRPM | DIASTOLIC BLOOD PRESSURE: 61 MMHG

## 2024-05-29 DIAGNOSIS — R19.00 RETROPERITONEAL MASS: ICD-10-CM

## 2024-05-29 LAB
BASOPHILS # BLD AUTO: 0.06 THOUSANDS/ÂΜL (ref 0–0.1)
BASOPHILS NFR BLD AUTO: 1 % (ref 0–1)
EOSINOPHIL # BLD AUTO: 0.13 THOUSAND/ÂΜL (ref 0–0.61)
EOSINOPHIL NFR BLD AUTO: 2 % (ref 0–6)
ERYTHROCYTE [DISTWIDTH] IN BLOOD BY AUTOMATED COUNT: 16.3 % (ref 11.6–15.1)
EXT PREGNANCY TEST URINE: NEGATIVE
EXT. CONTROL: NORMAL
HCT VFR BLD AUTO: 38.6 % (ref 34.8–46.1)
HGB BLD-MCNC: 12.1 G/DL (ref 11.5–15.4)
IMM GRANULOCYTES # BLD AUTO: 0.1 THOUSAND/UL (ref 0–0.2)
IMM GRANULOCYTES NFR BLD AUTO: 1 % (ref 0–2)
INR PPP: 0.99 (ref 0.84–1.19)
LYMPHOCYTES # BLD AUTO: 2.02 THOUSANDS/ÂΜL (ref 0.6–4.47)
LYMPHOCYTES NFR BLD AUTO: 26 % (ref 14–44)
MCH RBC QN AUTO: 24.9 PG (ref 26.8–34.3)
MCHC RBC AUTO-ENTMCNC: 31.3 G/DL (ref 31.4–37.4)
MCV RBC AUTO: 79 FL (ref 82–98)
MONOCYTES # BLD AUTO: 0.61 THOUSAND/ÂΜL (ref 0.17–1.22)
MONOCYTES NFR BLD AUTO: 8 % (ref 4–12)
NEUTROPHILS # BLD AUTO: 4.94 THOUSANDS/ÂΜL (ref 1.85–7.62)
NEUTS SEG NFR BLD AUTO: 62 % (ref 43–75)
NRBC BLD AUTO-RTO: 0 /100 WBCS
PLATELET # BLD AUTO: 255 THOUSANDS/UL (ref 149–390)
PMV BLD AUTO: 10 FL (ref 8.9–12.7)
PROTHROMBIN TIME: 13 SECONDS (ref 11.6–14.5)
RBC # BLD AUTO: 4.86 MILLION/UL (ref 3.81–5.12)
WBC # BLD AUTO: 7.86 THOUSAND/UL (ref 4.31–10.16)

## 2024-05-29 PROCEDURE — 49180 BIOPSY ABDOMINAL MASS: CPT

## 2024-05-29 PROCEDURE — 85610 PROTHROMBIN TIME: CPT | Performed by: RADIOLOGY

## 2024-05-29 PROCEDURE — 81025 URINE PREGNANCY TEST: CPT | Performed by: RADIOLOGY

## 2024-05-29 PROCEDURE — 88342 IMHCHEM/IMCYTCHM 1ST ANTB: CPT | Performed by: PATHOLOGY

## 2024-05-29 PROCEDURE — 77012 CT SCAN FOR NEEDLE BIOPSY: CPT

## 2024-05-29 PROCEDURE — 88305 TISSUE EXAM BY PATHOLOGIST: CPT | Performed by: PATHOLOGY

## 2024-05-29 PROCEDURE — 88341 IMHCHEM/IMCYTCHM EA ADD ANTB: CPT | Performed by: PATHOLOGY

## 2024-05-29 PROCEDURE — 85025 COMPLETE CBC W/AUTO DIFF WBC: CPT | Performed by: RADIOLOGY

## 2024-05-29 RX ORDER — FENTANYL CITRATE 50 UG/ML
INJECTION, SOLUTION INTRAMUSCULAR; INTRAVENOUS AS NEEDED
Status: COMPLETED | OUTPATIENT
Start: 2024-05-29 | End: 2024-05-29

## 2024-05-29 RX ORDER — LIDOCAINE WITH 8.4% SOD BICARB 0.9%(10ML)
SYRINGE (ML) INJECTION AS NEEDED
Status: COMPLETED | OUTPATIENT
Start: 2024-05-29 | End: 2024-05-29

## 2024-05-29 RX ORDER — MIDAZOLAM HYDROCHLORIDE 2 MG/2ML
INJECTION, SOLUTION INTRAMUSCULAR; INTRAVENOUS AS NEEDED
Status: COMPLETED | OUTPATIENT
Start: 2024-05-29 | End: 2024-05-29

## 2024-05-29 RX ORDER — SODIUM CHLORIDE 9 MG/ML
30 INJECTION, SOLUTION INTRAVENOUS CONTINUOUS
Status: DISCONTINUED | OUTPATIENT
Start: 2024-05-29 | End: 2024-05-30 | Stop reason: HOSPADM

## 2024-05-29 RX ADMIN — MIDAZOLAM 0.5 MG: 1 INJECTION INTRAMUSCULAR; INTRAVENOUS at 08:56

## 2024-05-29 RX ADMIN — Medication 10 ML: at 09:07

## 2024-05-29 RX ADMIN — FENTANYL CITRATE 25 MCG: 50 INJECTION INTRAMUSCULAR; INTRAVENOUS at 09:04

## 2024-05-29 RX ADMIN — MIDAZOLAM 0.5 MG: 1 INJECTION INTRAMUSCULAR; INTRAVENOUS at 09:04

## 2024-05-29 RX ADMIN — MIDAZOLAM 0.5 MG: 1 INJECTION INTRAMUSCULAR; INTRAVENOUS at 08:49

## 2024-05-29 RX ADMIN — FENTANYL CITRATE 25 MCG: 50 INJECTION INTRAMUSCULAR; INTRAVENOUS at 08:56

## 2024-05-29 RX ADMIN — SODIUM CHLORIDE 30 ML/HR: 0.9 INJECTION, SOLUTION INTRAVENOUS at 08:05

## 2024-05-29 RX ADMIN — MIDAZOLAM 0.5 MG: 1 INJECTION INTRAMUSCULAR; INTRAVENOUS at 09:22

## 2024-05-29 RX ADMIN — FENTANYL CITRATE 25 MCG: 50 INJECTION INTRAMUSCULAR; INTRAVENOUS at 08:49

## 2024-05-29 NOTE — INTERVAL H&P NOTE
"Update: (This section must be completed if the H&P was completed greater than 24 hrs to procedure or admission)    H&P reviewed. After examining the patient, I find no changed to the H&P since it had been written.    /63   Pulse 62   Temp 98.1 °F (36.7 °C) (Oral)   Resp 18   Ht 5' 5\" (1.651 m)   Wt 83.9 kg (185 lb)   SpO2 97%   BMI 30.79 kg/m²       Patient re-evaluated. Accept as history and physical.    Anders Matta MD/May 29, 2024/8:46 AM  "

## 2024-05-29 NOTE — BRIEF OP NOTE (RAD/CATH)
INTERVENTIONAL RADIOLOGY PROCEDURE NOTE    Date: 5/29/2024    Procedure:   Procedure Summary       Date: 05/29/24 Room / Location: [facility] CAT Scan    Anesthesia Start:  Anesthesia Stop:     Procedure: IR BIOPSY RETROPERITONEUM Diagnosis:       Retroperitoneal mass      (left RP mass)    Scheduled Providers:  Responsible Provider:     Anesthesia Type: Not recorded ASA Status: Not recorded            Preoperative diagnosis:   1. Retroperitoneal mass         Postoperative diagnosis: Same.    Surgeon: Anders Matta MD     Assistant: None. No qualified resident was available.    Blood loss: None    Specimens: Left retroperitoneal soft tissue     Findings: CT guided core biopsy of left retroperitoneal soft tissue sent for pathology and flow cytometry.    Complications: None immediate.    Anesthesia: conscious sedation and local

## 2024-05-29 NOTE — SEDATION DOCUMENTATION
Retroperitoneal mass biopsy successfully completed by Dr. Matta without complications. Tolerated well. Specimens collected per orders. Bedrest 1h. Report called to [facility] RN.

## 2024-05-29 NOTE — DISCHARGE INSTRUCTIONS
POST BIOPSY    Care after your procedure:    1. Limit your activities for 24 hours after your biopsy.    2. No driving day of biopsy.    3. Return to your normal diet.Small sips of flat soda will help with mild nausea.    4. Remove band-aid or dressing 24 hours after procedure.     Contact Interventional Radiology at [phone] ([facility] PATIENTS: Contact Interventional Radiology at [phone]) ([facility] PATIENTS: Contact Interventional Radiology at [phone]) if:    1. Difficulty breathing, nausea or vomiting.    2. Chills or fever above 101 degrees F.     3. Pain at biopsy site not relieved by medication.     4. Develop any redness, swelling, heat, unusual drainage, heavy bruising or bleeding from biopsy site. Procedural Sedation   WHAT YOU NEED TO KNOW:   Procedural sedation is medicine used during procedures to help you feel relaxed and calm. You will remember little to none of the procedure. After sedation you may feel tired, weak, or unsteady on your feet. You may also have trouble concentrating or short-term memory loss. These symptoms should go away in 24 hours or less.   DISCHARGE INSTRUCTIONS:     Call 911 or have someone else call for any of the following:   You have sudden trouble breathing.     You cannot be woken.      Contact Interventional Radiology at [phone]   ([facility] PATIENTS: Contact Interventional Radiology at [phone]) ([facility] PATIENTS: Contact Interventional Radiology at [phone]) if any of the following occur:     You have a severe headache or dizziness.     Your heart is beating faster than usual.    You have a fever or chills.     Your skin is itchy, swollen, or you have a rash.     You have nausea or are vomiting for more than 8 hours after the procedure.      You have questions or concerns about your condition or care.  Self-care:   Have someone stay with you for 24 hours. This person can drive you to errands and help you do things around the house. This person can  also watch for problems.      Rest and do quiet activities for 24 hours. Do not exercise, ride a bike, or play sports. Stand up slowly to prevent dizziness and falls. Take short walks around the house with another person. Slowly return to your usual activities the next day.      Do not drive or use dangerous machines or tools for 24 hours. You may injure yourself or others. Examples include a lawnmower, saw, or drill. Do not return to work for 24 hours if you use dangerous machines or tools for work.      Do not make important decisions for 24 hours. For example, do not sign important papers or invest money.      Drink liquids as directed. Liquids help flush the sedation medicine out of your body. Ask how much liquid to drink each day and which liquids are best for you.      Eat small, frequent meals to prevent nausea and vomiting. Start with clear liquids such as juice or broth. If you do not vomit after clear liquids, you can eat your usual foods.      Do not drink alcohol or take medicines that make you drowsy. This includes medicines that help you sleep and anxiety medicines. Ask your healthcare provider if it is safe for you to take pain medicine.  Follow up with your healthcare provider as directed: Write down your questions so you remember to ask them during your visits.

## 2024-05-31 LAB — SCAN RESULT: NORMAL

## 2024-06-03 ENCOUNTER — TELEPHONE (OUTPATIENT)
Dept: HEMATOLOGY ONCOLOGY | Facility: CLINIC | Age: 34
End: 2024-06-03

## 2024-06-03 NOTE — TELEPHONE ENCOUNTER
Patient Call    Who are you speaking with? Patient    If it is not the patient, are they listed on an active communication consent form? N/A   What is the reason for this call? Patient would like a call back to go over biopsy results   Does this require a call back? Yes   If a call back is required, please list best call back number [phone]   If a call back is required, advise that a message will be forwarded to their care team and someone will return their call as soon as possible.   Did you relay this information to the patient? Yes

## 2024-06-06 PROCEDURE — 88305 TISSUE EXAM BY PATHOLOGIST: CPT | Performed by: PATHOLOGY

## 2024-06-06 PROCEDURE — 88342 IMHCHEM/IMCYTCHM 1ST ANTB: CPT | Performed by: PATHOLOGY

## 2024-06-06 PROCEDURE — 88341 IMHCHEM/IMCYTCHM EA ADD ANTB: CPT | Performed by: PATHOLOGY

## 2024-06-07 ENCOUNTER — APPOINTMENT (OUTPATIENT)
Dept: LAB | Facility: CLINIC | Age: 34
End: 2024-06-07
Payer: COMMERCIAL

## 2024-06-07 DIAGNOSIS — K76.9 HEPATIC LESION: ICD-10-CM

## 2024-06-07 DIAGNOSIS — R19.00 RETROPERITONEAL MASS: ICD-10-CM

## 2024-06-07 DIAGNOSIS — R71.8 MICROCYTOSIS: ICD-10-CM

## 2024-06-07 LAB
BASOPHILS # BLD AUTO: 0.04 THOUSANDS/ÂΜL (ref 0–0.1)
BASOPHILS NFR BLD AUTO: 1 % (ref 0–1)
EOSINOPHIL # BLD AUTO: 0.06 THOUSAND/ÂΜL (ref 0–0.61)
EOSINOPHIL NFR BLD AUTO: 1 % (ref 0–6)
ERYTHROCYTE [DISTWIDTH] IN BLOOD BY AUTOMATED COUNT: 16.2 % (ref 11.6–15.1)
HCT VFR BLD AUTO: 41.6 % (ref 34.8–46.1)
HGB BLD-MCNC: 12.8 G/DL (ref 11.5–15.4)
IMM GRANULOCYTES # BLD AUTO: 0.01 THOUSAND/UL (ref 0–0.2)
IMM GRANULOCYTES NFR BLD AUTO: 0 % (ref 0–2)
LYMPHOCYTES # BLD AUTO: 2.79 THOUSANDS/ÂΜL (ref 0.6–4.47)
LYMPHOCYTES NFR BLD AUTO: 47 % (ref 14–44)
MCH RBC QN AUTO: 24.6 PG (ref 26.8–34.3)
MCHC RBC AUTO-ENTMCNC: 30.8 G/DL (ref 31.4–37.4)
MCV RBC AUTO: 80 FL (ref 82–98)
MONOCYTES # BLD AUTO: 0.51 THOUSAND/ÂΜL (ref 0.17–1.22)
MONOCYTES NFR BLD AUTO: 9 % (ref 4–12)
NEUTROPHILS # BLD AUTO: 2.43 THOUSANDS/ÂΜL (ref 1.85–7.62)
NEUTS SEG NFR BLD AUTO: 42 % (ref 43–75)
NRBC BLD AUTO-RTO: 0 /100 WBCS
PLATELET # BLD AUTO: 282 THOUSANDS/UL (ref 149–390)
PMV BLD AUTO: 10.4 FL (ref 8.9–12.7)
RBC # BLD AUTO: 5.21 MILLION/UL (ref 3.81–5.12)
TSH SERPL DL<=0.05 MIU/L-ACNC: 1.16 UIU/ML (ref 0.45–4.5)
WBC # BLD AUTO: 5.84 THOUSAND/UL (ref 4.31–10.16)

## 2024-06-07 PROCEDURE — 36415 COLL VENOUS BLD VENIPUNCTURE: CPT

## 2024-06-07 PROCEDURE — 85025 COMPLETE CBC W/AUTO DIFF WBC: CPT

## 2024-06-07 PROCEDURE — 84443 ASSAY THYROID STIM HORMONE: CPT

## 2024-06-07 PROCEDURE — 82384 ASSAY THREE CATECHOLAMINES: CPT

## 2024-06-07 PROCEDURE — 83835 ASSAY OF METANEPHRINES: CPT

## 2024-06-13 LAB
DOPAMINE 24H UR-MRATE: <30 PG/ML (ref 0–48)
EPINEPH PLAS-MCNC: 63 PG/ML (ref 0–62)
METANEPH FREE SERPL-MCNC: 44.9 PG/ML (ref 0–88)
NOREPINEPH PLAS-MCNC: 355 PG/ML (ref 0–874)
NORMETANEPHRINE SERPL-MCNC: 50.8 PG/ML (ref 0–210.1)

## 2024-06-24 ENCOUNTER — OFFICE VISIT (OUTPATIENT)
Dept: SURGICAL ONCOLOGY | Facility: CLINIC | Age: 34
End: 2024-06-24
Payer: COMMERCIAL

## 2024-06-24 VITALS
OXYGEN SATURATION: 98 % | HEART RATE: 58 BPM | DIASTOLIC BLOOD PRESSURE: 78 MMHG | SYSTOLIC BLOOD PRESSURE: 118 MMHG | HEIGHT: 65 IN | TEMPERATURE: 97.5 F | BODY MASS INDEX: 30.66 KG/M2 | RESPIRATION RATE: 18 BRPM | WEIGHT: 184 LBS

## 2024-06-24 DIAGNOSIS — R19.00 RETROPERITONEAL MASS: Primary | ICD-10-CM

## 2024-06-24 PROCEDURE — 99215 OFFICE O/P EST HI 40 MIN: CPT | Performed by: STUDENT IN AN ORGANIZED HEALTH CARE EDUCATION/TRAINING PROGRAM

## 2024-06-24 RX ORDER — CEFAZOLIN SODIUM 2 G/50ML
2000 SOLUTION INTRAVENOUS ONCE
OUTPATIENT
Start: 2024-06-24 | End: 2024-06-24

## 2024-06-24 NOTE — H&P (VIEW-ONLY)
Surgical Oncology F/U    [facility] [place]  [address] [address] [phone]    Patient:  Rivka Shepherd  1990  [id number]    Primary Care provider:  Angela Emmanuel DO  [address] [facility] [address], [address]  [facility] [address]    Referring provider:  No referring provider defined for this encounter.    Diagnoses and all orders for this visit:    Retroperitoneal mass  -     Case request operating room: RETROPERITONEAL MASS EXCISION LAPAROSCOPIC W/ ROBOTICS; Standing  -     CBC and differential; Future  -     Comprehensive metabolic panel; Future  -     APTT; Future  -     Protime-INR; Future  -     Type and screen; Future  -     EKG 12 lead; Future  -     XR chest pa & lateral; Future  -     Case request operating room: RETROPERITONEAL MASS EXCISION LAPAROSCOPIC W/ ROBOTICS    Other orders  -     Incentive spirometry; Standing  -     Insert and maintain IV line; Standing  -     Void On-Call to O.R.; Standing  -     Place sequential compression device; Standing  -     ceFAZolin (ANCEF) IVPB (premix in dextrose) 2,000 mg 50 mL        Chief Complaint   Patient presents with    Follow-up       No follow-ups on file.    Oncology History    No history exists.       History of Present Illness  :   34 yo female with new RP / paraganglioma-appearing mass. Underwent cross sectional imaging for PNA related to infant sickness recurrence. Demonstrated a superior LFT RP mass for which an MR was performed. This showed a cluster of nodules just medial to the adrenal with appearance of extra adrenal paraganglioma vs Lns vs RP fibrosis. Pt relatively asx. Complains of occasional abd pain as well as RT back pain and LLQ pain. No signirficant GI sx. No sx c/f pheo. No fatigue, weight loss, bone pain, flushing, fevers, night sweats. Since last visit - hormone studies negative. IR bx demonstrated ganglioneuroma.     Review of  Systems  Complete ROS Surg Onc:   Constitutional: The patient denies new or recent history of general fatigue, no recent weight loss, no change in appetite.   Eyes: No complaints of visual problems, no scleral icterus.   ENT: No complaints of ear pain, no hoarseness, no difficulty swallowing,  no tinnitus and no new masses in head, oral cavity, or neck.   Cardiovascular: No complaints of chest pain, no palpitations, no ankle edema.   Respiratory: No complaints of shortness of breath, no cough.   Gastrointestinal: No complaints of jaundice, no bloody stools, no pale stools.   Genitourinary: No complaints of dysuria, no hematuria, no nocturia, no frequent urination, no urethral discharge.   Musculoskeletal: No complaints of weakness, paralysis, joint stiffness or arthralgias.  Integumentary: No complaints of rash, no new lesions.   Neurological: No complaints of convulsions, no seizures, no dizziness.   Hematologic/Lymphatic: No complaints of easy bruising.   Endocrine:  No hot or cold intolerance.  No polydipsia, polyphagia, or polyuria.  Allergy/immunology:  No environmental allergies.  No food allergies.  Not immunocompromised.      Patient Active Problem List   Diagnosis    Anxiety    Dysmenorrhea    Menorrhagia with regular cycle    Hepatic lesion    Retroperitoneal mass    Microcytosis     Past Medical History:   Diagnosis Date    Anxiety     Gastroesophageal reflux in pregnancy 2022    General counseling and advice on female contraception 06/15/2023    DMPA sent to [facility] with refills for 1 year    Melasma of pregnancy 2022    Migraine     Postpartum care following  delivery 2023    Pyelectasis of fetus on prenatal ultrasound 2023    Per [facility] US on 23: The left kidney shows minimal pyelectasis to 7 mm. There is no evidence for caliectasis or hydronephrosis. Recommend a  US at least 72 hrs after birth.     RhD negative 2023    s/p rhogam at 29 weeks     Varicella      Past Surgical History:   Procedure Laterality Date    COLONOSCOPY  2005    IR BIOPSY RETROPERITONEUM  2024    KNEE ARTHROSCOPY Left 2006    ND  DELIVERY ONLY N/A 2023    Procedure:  SECTION ();  Surgeon: Neena iYp MD;  Location: [facility];  Service: Obstetrics    TONSILLECTOMY      UPPER GASTROINTESTINAL ENDOSCOPY      WISDOM TOOTH EXTRACTION  2009     Family History   Problem Relation Age of Onset    Anxiety disorder Mother     Heart disease Father     ADD / ADHD Sister     Anxiety disorder Sister     Lymphoma Maternal Grandmother     Anxiety disorder Maternal Grandmother     Stroke Paternal Grandmother     Heart attack Paternal Grandfather     Heart disease Paternal Grandfather      Social History     Socioeconomic History    Marital status: /Civil Union     Spouse name: Not on file    Number of children: Not on file    Years of education: Not on file    Highest education level: Not on file   Occupational History    Not on file   Tobacco Use    Smoking status: Former     Current packs/day: 0.00     Types: Cigarettes     Quit date: 2022     Years since quittin.8    Smokeless tobacco: Never   Vaping Use    Vaping status: Never Used   Substance and Sexual Activity    Alcohol use: Yes     Alcohol/week: 1.0 standard drink of alcohol     Types: 1 Glasses of wine per week    Drug use: Yes     Types: Marijuana     Comment: denies IVDA    Sexual activity: Yes     Partners: Male     Birth control/protection: Condom Male   Other Topics Concern    Not on file   Social History Narrative     for [employer] and around     Social Determinants of Health     Financial Resource Strain: Low Risk  (10/27/2022)    Overall Financial Resource Strain (CARDIA)     Difficulty of Paying Living Expenses: Not hard at all   Food Insecurity: No Food Insecurity (10/27/2022)    Hunger Vital Sign     Worried About Running Out of Food in the Last Year:  Never true     Ran Out of Food in the Last Year: Never true   Transportation Needs: No Transportation Needs (10/27/2022)    PRAPARE - Transportation     Lack of Transportation (Medical): No     Lack of Transportation (Non-Medical): No   Physical Activity: Not on file   Stress: No Stress Concern Present (11/8/2022)    [employer] [place] [employer] - Occupational Stress Questionnaire     Feeling of Stress : Only a little   Social Connections: Not on file   Intimate Partner Violence: Not At Risk (11/8/2022)    Humiliation, Afraid, Rape, and Kick questionnaire     Fear of Current or Ex-Partner: No     Emotionally Abused: No     Physically Abused: No     Sexually Abused: No   Housing Stability: Low Risk  (11/8/2022)    Housing Stability Vital Sign     Unable to Pay for Housing in the Last Year: No     Number of Places Lived in the Last Year: 1     Unstable Housing in the Last Year: No       Current Outpatient Medications:     doxycycline (ADOXA) 100 MG tablet, TAKE 1 TABLET BY MOUTH 2 TIMES A DAY FOR 5 DAYS. (Patient not taking: Reported on 5/13/2024), Disp: , Rfl:     naproxen (NAPROSYN) 500 mg tablet, Take 500 mg by mouth every 12 (twelve) hours as needed (Patient not taking: Reported on 5/13/2024), Disp: , Rfl:   No Known Allergies    Vitals:    06/24/24 0804   BP: 118/78   Pulse: 58   Resp: 18   Temp: 97.5 °F (36.4 °C)   SpO2: 98%       Physical Exam   General: Appears well, appears stated age  Skin: Warm, anicteric  HEENT: Normocephalic, atraumatic; sclera aniceteric, mucous membranes moist; cervical nodes without adenopathy  Cardiopulmonary: RRR, Easy WOB, no BLE edema  Abd: Flat and soft, nontender, no masses appreciated, no hepatosplenomegaly  MSK: Symmetric, no cyanosis, no overt weakness  Lymphatic: No cervical, axillary or inguinal lymphadenopathy  Neuro: Affect appropriate, no gross motor abnormalities      Labs: Reviewed in EPIC    Imaging  MRI abdomen w wo contrast    Result Date:  4/25/2024  Narrative: MRI - ABDOMEN - WITH AND WITHOUT CONTRAST INDICATION: 33 years / Female. R93.5: Abnormal findings on diagnostic imaging of other abdominal regions, including retroperitoneum K76.9: Liver disease, unspecified K63.89: Other specified diseases of intestine. Abnormal abdominopelvic CT. Possible liver lesion and left retroperitoneal/paravertebral nodules COMPARISON: 3/21/2024 abdominopelvic CT report from [facility]. Images are not available for direct comparison. TECHNIQUE: Multiplanar/multisequence MRI of the abdomen was performed before and after administration of contrast. IV Contrast: 8 mL of Gadobutrol injection (SINGLE-DOSE) FINDINGS: Mammogram: LOWER CHEST:   Within normal limits. LIVER: Normal signal intensity and morphology. No suspicious mass. Solitary, 0.9 cm, circumscribed inferior right lobe posterior segment nodule. Typical signal intensity and enhancement of benign hemangioma (8/29 Patent hepatic and portal veins. BILE DUCTS:  Normal caliber and morphology. GALLBLADDER: High T1w signal intensity luminal contents suggesting sludge. No focal filling defects, wall thickening or pericholecystic fluid. SPLEEN:  Within normal limits. PANCREAS:  Within normal limits. Normal pancreatic duct caliber and morphology. ADRENAL GLANDS:  Within normal limits. Left retroperitoneal mass appears separate from the left adrenal. Please see description below. KIDNEYS/PROXIMAL URETERS:  Within normal limits. BOWEL:   Within normal limits. PERITONEUM/RETROPERITONEUM: No ascites or fluid collections. Approximately 4.6 x 5.7 x 2.0 cm (length X depth X width) somewhat oval-shaped, masslike opacity in the left retroperitoneum, lateral to the diaphragmatic sudha and medial to the left adrenal gland (5/12 and 13/70). This is measured today as a solitary mass. On the CT, was described as a cluster of soft tissue appearing nodularities.  Could be small several soft tissue nodule nodules   by normal retroperitoneal fat versus single mass composed of soft tissue and fat. Soft tissue components are homogeneous, similar signal intensity to spleen, and have homogeneous enhancement, greatest on delayed phase. LYMPH NODES: No separate lymphadenopathy in the retroperitoneum or mesentery. VASCULAR STRUCTURES:  Within normal limits. Retroaortic left renal vein. BONES:  No suspicious osseous lesion. ABDOMINAL WALL:  Within normal limits. VISUALIZED PELVIC STRUCTURES: Within normal limits.     Impression: Small, benign hepatic hemangioma. No suspicious liver mass. Left retroperitoneal mass versus clustered nodules. Detailed description above. Solitary mass favored on this exam. Please compare to prior CT which has better spatial resolution. The differential diagnosis would include clustered lymph nodes, extra-adrenal myelolipoma, well-differentiated liposarcoma, and primary retroperitoneal teratoma. Extramedullary hematopoiesis and hibernoma can also be considered, probably less likely. Tissue diagnosis may be indicated. Workstation performed: [id number]       I independently reviewed and interpreted the available laboratory and imaging data incl CT, MR, hosp course, labs, bx, endo studies      Discussion/Summary:   32 yo female with LFT RP mass, bx suggests ganglioneuroma.  Today we discussed benign nerve tissue tumors.  We discussed that these tumors are almost certainly benign and remained benign throughout their history.  However, given that she is young and malignancy cannot be entirely excluded, I do recommend excision.  We discussed a robotic approach to a left adrenalectomy with inclusion of the paraganglioma tissue.  We discussed risks to include infection, bleeding, hormone and vital sign fluctuations.  We discussed the risk of transition to an open procedure were it not to proceed safely from a robotic approach..  We discussed the hospital course and the more global risks to include MI, stroke, PE,  pneumonia, death.  The patient is young and healthy and she tolerated the procedure well.  All questions were answered today.

## 2024-06-24 NOTE — PROGRESS NOTES
Surgical Oncology F/U    [facility] [place]  [address] [address] [phone]    Patient:  Rivka Shepherd  1990  [id number]    Primary Care provider:  Angela Emmanuel DO  [address], [address]  [facility] [address]    Referring provider:  No referring provider defined for this encounter.    Diagnoses and all orders for this visit:    Retroperitoneal mass  -     Case request operating room: RETROPERITONEAL MASS EXCISION LAPAROSCOPIC W/ ROBOTICS; Standing  -     CBC and differential; Future  -     Comprehensive metabolic panel; Future  -     APTT; Future  -     Protime-INR; Future  -     Type and screen; Future  -     EKG 12 lead; Future  -     XR chest pa & lateral; Future  -     Case request operating room: RETROPERITONEAL MASS EXCISION LAPAROSCOPIC W/ ROBOTICS    Other orders  -     Incentive spirometry; Standing  -     Insert and maintain IV line; Standing  -     Void On-Call to O.R.; Standing  -     Place sequential compression device; Standing  -     ceFAZolin (ANCEF) IVPB (premix in dextrose) 2,000 mg 50 mL        Chief Complaint   Patient presents with    Follow-up       No follow-ups on file.    Oncology History    No history exists.       History of Present Illness  :   32 yo female with new RP / paraganglioma-appearing mass. Underwent cross sectional imaging for PNA related to infant sickness recurrence. Demonstrated a superior LFT RP mass for which an MR was performed. This showed a cluster of nodules just medial to the adrenal with appearance of extra adrenal paraganglioma vs Lns vs RP fibrosis. Pt relatively asx. Complains of occasional abd pain as well as RT back pain and LLQ pain. No signirficant GI sx. No sx c/f pheo. No fatigue, weight loss, bone pain, flushing, fevers, night sweats. Since last visit - hormone studies negative. IR bx demonstrated ganglioneuroma.     Review of  Systems  Complete ROS Surg Onc:   Constitutional: The patient denies new or recent history of general fatigue, no recent weight loss, no change in appetite.   Eyes: No complaints of visual problems, no scleral icterus.   ENT: No complaints of ear pain, no hoarseness, no difficulty swallowing,  no tinnitus and no new masses in head, oral cavity, or neck.   Cardiovascular: No complaints of chest pain, no palpitations, no ankle edema.   Respiratory: No complaints of shortness of breath, no cough.   Gastrointestinal: No complaints of jaundice, no bloody stools, no pale stools.   Genitourinary: No complaints of dysuria, no hematuria, no nocturia, no frequent urination, no urethral discharge.   Musculoskeletal: No complaints of weakness, paralysis, joint stiffness or arthralgias.  Integumentary: No complaints of rash, no new lesions.   Neurological: No complaints of convulsions, no seizures, no dizziness.   Hematologic/Lymphatic: No complaints of easy bruising.   Endocrine:  No hot or cold intolerance.  No polydipsia, polyphagia, or polyuria.  Allergy/immunology:  No environmental allergies.  No food allergies.  Not immunocompromised.      Patient Active Problem List   Diagnosis    Anxiety    Dysmenorrhea    Menorrhagia with regular cycle    Hepatic lesion    Retroperitoneal mass    Microcytosis     Past Medical History:   Diagnosis Date    Anxiety     Gastroesophageal reflux in pregnancy 2022    General counseling and advice on female contraception 06/15/2023    DMPA sent to [facility] with refills for 1 year    Melasma of pregnancy 2022    Migraine     Postpartum care following  delivery 2023    Pyelectasis of fetus on prenatal ultrasound 2023    Per [facility] US on 23: The left kidney shows minimal pyelectasis to 7 mm. There is no evidence for caliectasis or hydronephrosis. Recommend a  US at least 72 hrs after birth.     RhD negative 2023    s/p rhogam at 29 weeks     Varicella      Past Surgical History:   Procedure Laterality Date    COLONOSCOPY  2005    IR BIOPSY RETROPERITONEUM  2024    KNEE ARTHROSCOPY Left 2006    [place]  DELIVERY ONLY N/A 2023    Procedure:  SECTION ();  Surgeon: Neena Yip MD;  Location: [facility];  Service: Obstetrics    TONSILLECTOMY      UPPER GASTROINTESTINAL ENDOSCOPY      WISDOM TOOTH EXTRACTION  2009     Family History   Problem Relation Age of Onset    Anxiety disorder Mother     Heart disease Father     ADD / ADHD Sister     Anxiety disorder Sister     Lymphoma Maternal Grandmother     Anxiety disorder Maternal Grandmother     Stroke Paternal Grandmother     Heart attack Paternal Grandfather     Heart disease Paternal Grandfather      Social History     Socioeconomic History    Marital status: /Civil Union     Spouse name: Not on file    Number of children: Not on file    Years of education: Not on file    Highest education level: Not on file   Occupational History    Not on file   Tobacco Use    Smoking status: Former     Current packs/day: 0.00     Types: Cigarettes     Quit date: 2022     Years since quittin.8    Smokeless tobacco: Never   Vaping Use    Vaping status: Never Used   Substance and Sexual Activity    Alcohol use: Yes     Alcohol/week: 1.0 standard drink of alcohol     Types: 1 Glasses of wine per week    Drug use: Yes     Types: Marijuana     Comment: denies IVDA    Sexual activity: Yes     Partners: Male     Birth control/protection: Condom Male   Other Topics Concern    Not on file   Social History Narrative     for [employer] and around     Social Determinants of Health     Financial Resource Strain: Low Risk  (10/27/2022)    Overall Financial Resource Strain (CARDIA)     Difficulty of Paying Living Expenses: Not hard at all   Food Insecurity: No Food Insecurity (10/27/2022)    Hunger Vital Sign     Worried About Running Out of Food in the Last Year:  Never true     Ran Out of Food in the Last Year: Never true   Transportation Needs: No Transportation Needs (10/27/2022)    PRAPARE - Transportation     Lack of Transportation (Medical): No     Lack of Transportation (Non-Medical): No   Physical Activity: Not on file   Stress: No Stress Concern Present (11/8/2022)    [place] [employer] - Occupational Stress Questionnaire     Feeling of Stress : Only a little   Social Connections: Not on file   Intimate Partner Violence: Not At Risk (11/8/2022)    Humiliation, Afraid, Rape, and Kick questionnaire     Fear of Current or Ex-Partner: No     Emotionally Abused: No     Physically Abused: No     Sexually Abused: No   Housing Stability: Low Risk  (11/8/2022)    Housing Stability Vital Sign     Unable to Pay for Housing in the Last Year: No     Number of Places Lived in the Last Year: 1     Unstable Housing in the Last Year: No       Current Outpatient Medications:     doxycycline (ADOXA) 100 MG tablet, TAKE 1 TABLET BY MOUTH 2 TIMES A DAY FOR 5 DAYS. (Patient not taking: Reported on 5/13/2024), Disp: , Rfl:     naproxen (NAPROSYN) 500 mg tablet, Take 500 mg by mouth every 12 (twelve) hours as needed (Patient not taking: Reported on 5/13/2024), Disp: , Rfl:   No Known Allergies    Vitals:    06/24/24 0804   BP: 118/78   Pulse: 58   Resp: 18   Temp: 97.5 °F (36.4 °C)   SpO2: 98%       Physical Exam   General: Appears well, appears stated age  Skin: Warm, anicteric  HEENT: Normocephalic, atraumatic; sclera aniceteric, mucous membranes moist; cervical nodes without adenopathy  Cardiopulmonary: RRR, Easy WOB, no BLE edema  Abd: Flat and soft, nontender, no masses appreciated, no hepatosplenomegaly  MSK: Symmetric, no cyanosis, no overt weakness  Lymphatic: No cervical, axillary or inguinal lymphadenopathy  Neuro: Affect appropriate, no gross motor abnormalities      Labs: Reviewed in EPIC    Imaging  MRI abdomen w wo contrast    Result Date:  4/25/2024  Narrative: MRI - ABDOMEN - WITH AND WITHOUT CONTRAST INDICATION: 33 years / Female. R93.5: Abnormal findings on diagnostic imaging of other abdominal regions, including retroperitoneum K76.9: Liver disease, unspecified K63.89: Other specified diseases of intestine. Abnormal abdominopelvic CT. Possible liver lesion and left retroperitoneal/paravertebral nodules COMPARISON: 3/21/2024 abdominopelvic CT report from [facility]. Images are not available for direct comparison. TECHNIQUE: Multiplanar/multisequence MRI of the abdomen was performed before and after administration of contrast. IV Contrast: 8 mL of Gadobutrol injection (SINGLE-DOSE) FINDINGS: Mammogram: LOWER CHEST:   Within normal limits. LIVER: Normal signal intensity and morphology. No suspicious mass. Solitary, 0.9 cm, circumscribed inferior right lobe posterior segment nodule. Typical signal intensity and enhancement of benign hemangioma (8/29 Patent hepatic and portal veins. BILE DUCTS:  Normal caliber and morphology. GALLBLADDER: High T1w signal intensity luminal contents suggesting sludge. No focal filling defects, wall thickening or pericholecystic fluid. SPLEEN:  Within normal limits. PANCREAS:  Within normal limits. Normal pancreatic duct caliber and morphology. ADRENAL GLANDS:  Within normal limits. Left retroperitoneal mass appears separate from the left adrenal. Please see description below. KIDNEYS/PROXIMAL URETERS:  Within normal limits. BOWEL:   Within normal limits. PERITONEUM/RETROPERITONEUM: No ascites or fluid collections. Approximately 4.6 x 5.7 x 2.0 cm (length X depth X width) somewhat oval-shaped, masslike opacity in the left retroperitoneum, lateral to the diaphragmatic sudha and medial to the left adrenal gland (5/12 and 13/70). This is measured today as a solitary mass. On the CT, was described as a cluster of soft tissue appearing nodularities.  Could be small several soft tissue nodule nodules   by normal retroperitoneal fat versus single mass composed of soft tissue and fat. Soft tissue components are homogeneous, similar signal intensity to spleen, and have homogeneous enhancement, greatest on delayed phase. LYMPH NODES: No separate lymphadenopathy in the retroperitoneum or mesentery. VASCULAR STRUCTURES:  Within normal limits. Retroaortic left renal vein. BONES:  No suspicious osseous lesion. ABDOMINAL WALL:  Within normal limits. VISUALIZED PELVIC STRUCTURES: Within normal limits.     Impression: Small, benign hepatic hemangioma. No suspicious liver mass. Left retroperitoneal mass versus clustered nodules. Detailed description above. Solitary mass favored on this exam. Please compare to prior CT which has better spatial resolution. The differential diagnosis would include clustered lymph nodes, extra-adrenal myelolipoma, well-differentiated liposarcoma, and primary retroperitoneal teratoma. Extramedullary hematopoiesis and hibernoma can also be considered, probably less likely. Tissue diagnosis may be indicated. Workstation performed: [id number]       I independently reviewed and interpreted the available laboratory and imaging data incl CT, MR, hosp course, labs, bx, endo studies      Discussion/Summary:   32 yo female with LFT RP mass, bx suggests ganglioneuroma.  Today we discussed benign nerve tissue tumors.  We discussed that these tumors are almost certainly benign and remained benign throughout their history.  However, given that she is young and malignancy cannot be entirely excluded, I do recommend excision.  We discussed a robotic approach to a left adrenalectomy with inclusion of the paraganglioma tissue.  We discussed risks to include infection, bleeding, hormone and vital sign fluctuations.  We discussed the risk of transition to an open procedure were it not to proceed safely from a robotic approach..  We discussed the hospital course and the more global risks to include MI, stroke, PE,  pneumonia, death.  The patient is young and healthy and she tolerated the procedure well.  All questions were answered today.

## 2024-06-27 ENCOUNTER — TELEPHONE (OUTPATIENT)
Dept: SURGICAL ONCOLOGY | Facility: CLINIC | Age: 34
End: 2024-06-27

## 2024-06-27 ENCOUNTER — TELEPHONE (OUTPATIENT)
Age: 34
End: 2024-06-27

## 2024-06-27 NOTE — TELEPHONE ENCOUNTER
Spoke to patient regarding getting her labwork prior to her surgery and her chest xray and EKG, patient aware to get done ASAP  7/9 surgery Cynthia aware of conversation

## 2024-06-27 NOTE — TELEPHONE ENCOUNTER
Pt called in and was questioning if she needed to get her labs done a certain day/time prior to surgery on 7/9/24 with .    Pt would like a callback at [phone]

## 2024-06-28 ENCOUNTER — HOSPITAL ENCOUNTER (OUTPATIENT)
Dept: RADIOLOGY | Facility: HOSPITAL | Age: 34
Discharge: HOME | End: 2024-06-28
Payer: COMMERCIAL

## 2024-06-28 ENCOUNTER — APPOINTMENT (OUTPATIENT)
Dept: LAB | Facility: CLINIC | Age: 34
End: 2024-06-28
Payer: COMMERCIAL

## 2024-06-28 DIAGNOSIS — R19.00 RETROPERITONEAL MASS: ICD-10-CM

## 2024-06-28 LAB
ABO GROUP BLD: NORMAL
ALBUMIN SERPL BCG-MCNC: 4.5 G/DL (ref 3.5–5)
ALP SERPL-CCNC: 67 U/L (ref 34–104)
ALT SERPL W P-5'-P-CCNC: 12 U/L (ref 7–52)
ANION GAP SERPL CALCULATED.3IONS-SCNC: 7 MMOL/L (ref 4–13)
APTT PPP: 28 SECONDS (ref 23–37)
AST SERPL W P-5'-P-CCNC: 15 U/L (ref 13–39)
ATRIAL RATE: 54 BPM
BASOPHILS # BLD AUTO: 0.05 THOUSANDS/ÂΜL (ref 0–0.1)
BASOPHILS NFR BLD AUTO: 1 % (ref 0–1)
BILIRUB SERPL-MCNC: 0.43 MG/DL (ref 0.2–1)
BLD GP AB SCN SERPL QL: NEGATIVE
BUN SERPL-MCNC: 6 MG/DL (ref 5–25)
CALCIUM SERPL-MCNC: 9.5 MG/DL (ref 8.4–10.2)
CHLORIDE SERPL-SCNC: 104 MMOL/L (ref 96–108)
CO2 SERPL-SCNC: 27 MMOL/L (ref 21–32)
CREAT SERPL-MCNC: 0.64 MG/DL (ref 0.6–1.3)
EOSINOPHIL # BLD AUTO: 0.09 THOUSAND/ÂΜL (ref 0–0.61)
EOSINOPHIL NFR BLD AUTO: 2 % (ref 0–6)
ERYTHROCYTE [DISTWIDTH] IN BLOOD BY AUTOMATED COUNT: 15.9 % (ref 11.6–15.1)
GFR SERPL CREATININE-BSD FRML MDRD: 115 ML/MIN/1.73SQ M
GLUCOSE P FAST SERPL-MCNC: 87 MG/DL (ref 65–99)
HCT VFR BLD AUTO: 40.7 % (ref 34.8–46.1)
HGB BLD-MCNC: 12.4 G/DL (ref 11.5–15.4)
IMM GRANULOCYTES # BLD AUTO: 0.01 THOUSAND/UL (ref 0–0.2)
IMM GRANULOCYTES NFR BLD AUTO: 0 % (ref 0–2)
INR PPP: 0.96 (ref 0.84–1.19)
LYMPHOCYTES # BLD AUTO: 1.7 THOUSANDS/ÂΜL (ref 0.6–4.47)
LYMPHOCYTES NFR BLD AUTO: 35 % (ref 14–44)
MCH RBC QN AUTO: 24.7 PG (ref 26.8–34.3)
MCHC RBC AUTO-ENTMCNC: 30.5 G/DL (ref 31.4–37.4)
MCV RBC AUTO: 81 FL (ref 82–98)
MONOCYTES # BLD AUTO: 0.49 THOUSAND/ÂΜL (ref 0.17–1.22)
MONOCYTES NFR BLD AUTO: 10 % (ref 4–12)
NEUTROPHILS # BLD AUTO: 2.55 THOUSANDS/ÂΜL (ref 1.85–7.62)
NEUTS SEG NFR BLD AUTO: 52 % (ref 43–75)
NRBC BLD AUTO-RTO: 0 /100 WBCS
P AXIS: 35 DEGREES
PLATELET # BLD AUTO: 265 THOUSANDS/UL (ref 149–390)
PMV BLD AUTO: 11.4 FL (ref 8.9–12.7)
POTASSIUM SERPL-SCNC: 3.6 MMOL/L (ref 3.5–5.3)
PR INTERVAL: 130 MS
PROT SERPL-MCNC: 7.3 G/DL (ref 6.4–8.4)
PROTHROMBIN TIME: 13.4 SECONDS (ref 11.6–14.5)
QRS AXIS: 62 DEGREES
QRSD INTERVAL: 98 MS
QT INTERVAL: 444 MS
QTC INTERVAL: 421 MS
RBC # BLD AUTO: 5.03 MILLION/UL (ref 3.81–5.12)
RH BLD: NEGATIVE
SODIUM SERPL-SCNC: 138 MMOL/L (ref 135–147)
SPECIMEN EXPIRATION DATE: NORMAL
T WAVE AXIS: 58 DEGREES
VENTRICULAR RATE: 54 BPM
WBC # BLD AUTO: 4.89 THOUSAND/UL (ref 4.31–10.16)

## 2024-06-28 PROCEDURE — 85610 PROTHROMBIN TIME: CPT

## 2024-06-28 PROCEDURE — 80053 COMPREHEN METABOLIC PANEL: CPT

## 2024-06-28 PROCEDURE — 85730 THROMBOPLASTIN TIME PARTIAL: CPT

## 2024-06-28 PROCEDURE — 85025 COMPLETE CBC W/AUTO DIFF WBC: CPT

## 2024-06-28 PROCEDURE — 86850 RBC ANTIBODY SCREEN: CPT

## 2024-06-28 PROCEDURE — 86900 BLOOD TYPING SEROLOGIC ABO: CPT

## 2024-06-28 PROCEDURE — 71046 X-RAY EXAM CHEST 2 VIEWS: CPT

## 2024-06-28 PROCEDURE — 93010 ELECTROCARDIOGRAM REPORT: CPT | Performed by: INTERNAL MEDICINE

## 2024-06-28 PROCEDURE — 86901 BLOOD TYPING SEROLOGIC RH(D): CPT

## 2024-06-28 PROCEDURE — 36415 COLL VENOUS BLD VENIPUNCTURE: CPT

## 2024-07-02 NOTE — PRE-PROCEDURE INSTRUCTIONS
No outpatient medications have been marked as taking for the 7/9/24 encounter (Hospital Encounter).   Medication instructions for day surgery reviewed. Please use only a sip of water to take your instructed medications. Avoid all over the counter vitamins, supplements and NSAIDS for one week prior to surgery per anesthesia guidelines. Tylenol is ok to take as needed.     You will receive a call one business day prior to surgery with an arrival time and hospital directions. If your surgery is scheduled on a Monday, the hospital will be calling you on the Friday prior to your surgery. If you have not heard from anyone by 8pm, please call the hospital supervisor through the hospital  at [phone] or [place] [phone]).    Do not eat or drink anything after midnight the night before your surgery, including candy, mints, lifesavers, or chewing gum. Do not drink alcohol 24hrs before your surgery. Try not to smoke at least 24hrs before your surgery.       Follow the pre surgery showering instructions as listed in the “My Surgical Experience Booklet” or otherwise provided by your surgeon's office. Do not use a blade to shave the surgical area 1 week before surgery. It is okay to use a clean electric clippers up to 24 hours before surgery. Do not apply any lotions, creams, including makeup, cologne, deodorant, or perfumes after showering on the day of your surgery. Do not use dry shampoo, hair spray, hair gel, or any type of hair products.     No contact lenses, eye make-up, or artificial eyelashes. Remove nail polish, including gel polish, and any artificial, gel, or acrylic nails if possible. Remove all jewelry including rings and body piercing jewelry.     Wear causal clothing that is easy to take on and off. Consider your type of surgery.    Keep any valuables, jewelry, piercings at home. Please bring any specially ordered equipment (sling, braces) if indicated.    Arrange for a responsible person to drive  you to and from the hospital on the day of your surgery. Please confirm the visitor policy for the day of your procedure when you receive your phone call with an arrival time.     Call the surgeon's office with any new illnesses, exposures, or additional questions prior to surgery.    Please reference your “My Surgical Experience Booklet” for additional information to prepare for your upcoming surgery.

## 2024-07-03 LAB
ABO GROUP BLD: NORMAL
BLD GP AB SCN SERPL QL: NEGATIVE
RH BLD: NEGATIVE
SPECIMEN EXPIRATION DATE: NORMAL

## 2024-07-08 ENCOUNTER — ANESTHESIA EVENT (OUTPATIENT)
Dept: PERIOP | Facility: HOSPITAL | Age: 34
DRG: 358 | End: 2024-07-08
Payer: COMMERCIAL

## 2024-07-08 NOTE — ANESTHESIA PREPROCEDURE EVALUATION
"Procedure:  ROBOTIC LEFT RETROPERITONEAL MASS EXCISION/ ADRENALECTOMY (Left: Abdomen)    Per surg onc notes:  34 yo female with LFT RP mass, bx suggests ganglioneuroma.  Today we discussed benign nerve tissue tumors.  We discussed that these tumors are almost certainly benign and remained benign throughout their history.     Relevant Problems   ANESTHESIA (within normal limits)      CARDIO (within normal limits)      ENDO (within normal limits)      GI/HEPATIC   (+) Hepatic lesion      /RENAL (within normal limits)      GYN (within normal limits)      HEMATOLOGY (within normal limits)      MUSCULOSKELETAL (within normal limits)      NEURO/PSYCH   (+) Anxiety      PULMONARY   (+) URI (upper respiratory infection) (Recent cold per patient, resolving, still with residual slight cough)      Other   (+) Retroperitoneal mass   Pt relatively asymptomatic    Recent labs personally reviewed:  Lab Results   Component Value Date    WBC 4.89 06/28/2024    HGB 12.4 06/28/2024     06/28/2024     Lab Results   Component Value Date    K 3.6 06/28/2024    BUN 6 06/28/2024    CREATININE 0.64 06/28/2024     Lab Results   Component Value Date    PTT 28 06/28/2024      Lab Results   Component Value Date    INR 0.96 06/28/2024       No results found for: \"HGBA1C\"    Type and Screen:  O    MRI abdomen 4/22/24  IMPRESSION:     Small, benign hepatic hemangioma. No suspicious liver mass.     Approximately 4.6 x 5.7 x 2.0 cm (length X depth X width) somewhat oval-shaped, masslike opacity in the left retroperitoneum. Left retroperitoneal mass versus clustered nodules. Detailed description above. Solitary mass favored on this exam. Please compare to prior CT which has better spatial resolution. The differential diagnosis would include clustered lymph nodes,   extra-adrenal myelolipoma, well-differentiated liposarcoma, and primary retroperitoneal teratoma. Extramedullary hematopoiesis and hibernoma can also be considered, probably less " likely.  Tissue diagnosis may be indicated.    IR bx demonstrated ganglioneuroma.   Physical Exam    Airway    Mallampati score: II  TM Distance: >3 FB  Neck ROM: full     Dental   Comment: Pt's tooth cracked last night, back molar. States no teeth loose and doesn't feel infected.     Cardiovascular  Cardiovascular exam normal    Pulmonary  Pulmonary exam normal Breath sounds clear to auscultation    Other Findings        Anesthesia Plan  ASA Score- 2     Anesthesia Type- general with ASA Monitors.         Additional Monitors: arterial line.    Airway Plan: ETT.    Comment: Discussed nerve block with patient if surgeon ends up opening/making large incision. Risks vs benefits discussed. All questions answered..       Plan Factors-Exercise tolerance (METS): >4 METS.    Chart reviewed. EKG reviewed. Imaging results reviewed. Existing labs reviewed.     Patient is a current smoker (every day marijuana).              Induction- intravenous.    Postoperative Plan- Plan for postoperative opioid use. Planned trial extubation        Informed Consent- Anesthetic plan and risks discussed with patient.  I personally reviewed this patient with the CRNA. Discussed and agreed on the Anesthesia Plan with the CRNA..

## 2024-07-09 ENCOUNTER — ANESTHESIA (OUTPATIENT)
Dept: PERIOP | Facility: HOSPITAL | Age: 34
DRG: 358 | End: 2024-07-09
Payer: COMMERCIAL

## 2024-07-09 ENCOUNTER — HOSPITAL ENCOUNTER (INPATIENT)
Facility: HOSPITAL | Age: 34
LOS: 2 days | Discharge: HOME | DRG: 358 | End: 2024-07-11
Attending: STUDENT IN AN ORGANIZED HEALTH CARE EDUCATION/TRAINING PROGRAM | Admitting: STUDENT IN AN ORGANIZED HEALTH CARE EDUCATION/TRAINING PROGRAM
Payer: COMMERCIAL

## 2024-07-09 ENCOUNTER — ANESTHESIA EVENT (OUTPATIENT)
Dept: ANESTHESIOLOGY | Facility: HOSPITAL | Age: 34
End: 2024-07-09

## 2024-07-09 ENCOUNTER — ANESTHESIA (OUTPATIENT)
Dept: ANESTHESIOLOGY | Facility: HOSPITAL | Age: 34
End: 2024-07-09

## 2024-07-09 DIAGNOSIS — R19.00 RETROPERITONEAL MASS: Primary | ICD-10-CM

## 2024-07-09 DIAGNOSIS — Z01.818 PRE-OP TESTING: ICD-10-CM

## 2024-07-09 PROBLEM — J06.9 URI (UPPER RESPIRATORY INFECTION): Status: ACTIVE | Noted: 2024-07-09

## 2024-07-09 LAB
ABO GROUP BLD: NORMAL
BLD GP AB SCN SERPL QL: NEGATIVE
EXT PREGNANCY TEST URINE: NEGATIVE
EXT. CONTROL: NORMAL
RH BLD: NEGATIVE
SPECIMEN EXPIRATION DATE: NORMAL

## 2024-07-09 PROCEDURE — 86901 BLOOD TYPING SEROLOGIC RH(D): CPT | Performed by: STUDENT IN AN ORGANIZED HEALTH CARE EDUCATION/TRAINING PROGRAM

## 2024-07-09 PROCEDURE — 0GT20ZZ RESECTION OF LEFT ADRENAL GLAND, OPEN APPROACH: ICD-10-PCS | Performed by: STUDENT IN AN ORGANIZED HEALTH CARE EDUCATION/TRAINING PROGRAM

## 2024-07-09 PROCEDURE — 0WBH4ZZ EXCISION OF RETROPERITONEUM, PERCUTANEOUS ENDOSCOPIC APPROACH: ICD-10-PCS | Performed by: STUDENT IN AN ORGANIZED HEALTH CARE EDUCATION/TRAINING PROGRAM

## 2024-07-09 PROCEDURE — 8E0W4CZ ROBOTIC ASSISTED PROCEDURE OF TRUNK REGION, PERCUTANEOUS ENDOSCOPIC APPROACH: ICD-10-PCS | Performed by: STUDENT IN AN ORGANIZED HEALTH CARE EDUCATION/TRAINING PROGRAM

## 2024-07-09 PROCEDURE — 86850 RBC ANTIBODY SCREEN: CPT | Performed by: STUDENT IN AN ORGANIZED HEALTH CARE EDUCATION/TRAINING PROGRAM

## 2024-07-09 PROCEDURE — 81025 URINE PREGNANCY TEST: CPT | Performed by: STUDENT IN AN ORGANIZED HEALTH CARE EDUCATION/TRAINING PROGRAM

## 2024-07-09 PROCEDURE — 86900 BLOOD TYPING SEROLOGIC ABO: CPT | Performed by: STUDENT IN AN ORGANIZED HEALTH CARE EDUCATION/TRAINING PROGRAM

## 2024-07-09 RX ORDER — SODIUM CHLORIDE, SODIUM LACTATE, POTASSIUM CHLORIDE, CALCIUM CHLORIDE 600; 310; 30; 20 MG/100ML; MG/100ML; MG/100ML; MG/100ML
125 INJECTION, SOLUTION INTRAVENOUS CONTINUOUS
Status: DISCONTINUED | OUTPATIENT
Start: 2024-07-09 | End: 2024-07-10

## 2024-07-09 RX ORDER — PROPOFOL 10 MG/ML
INJECTION, EMULSION INTRAVENOUS AS NEEDED
Status: DISCONTINUED | OUTPATIENT
Start: 2024-07-09 | End: 2024-07-09

## 2024-07-09 RX ORDER — ENOXAPARIN SODIUM 100 MG/ML
40 INJECTION SUBCUTANEOUS DAILY
Status: DISCONTINUED | OUTPATIENT
Start: 2024-07-10 | End: 2024-07-11 | Stop reason: HOSPADM

## 2024-07-09 RX ORDER — HYDROMORPHONE HCL/PF 1 MG/ML
0.5 SYRINGE (ML) INJECTION
Status: COMPLETED | OUTPATIENT
Start: 2024-07-09 | End: 2024-07-09

## 2024-07-09 RX ORDER — ROCURONIUM BROMIDE 10 MG/ML
INJECTION, SOLUTION INTRAVENOUS AS NEEDED
Status: DISCONTINUED | OUTPATIENT
Start: 2024-07-09 | End: 2024-07-09

## 2024-07-09 RX ORDER — ONDANSETRON 2 MG/ML
4 INJECTION INTRAMUSCULAR; INTRAVENOUS ONCE AS NEEDED
Status: DISCONTINUED | OUTPATIENT
Start: 2024-07-09 | End: 2024-07-09 | Stop reason: HOSPADM

## 2024-07-09 RX ORDER — MAGNESIUM HYDROXIDE 1200 MG/15ML
LIQUID ORAL AS NEEDED
Status: DISCONTINUED | OUTPATIENT
Start: 2024-07-09 | End: 2024-07-09 | Stop reason: HOSPADM

## 2024-07-09 RX ORDER — HYDROMORPHONE HCL/PF 1 MG/ML
0.5 SYRINGE (ML) INJECTION
Status: DISCONTINUED | OUTPATIENT
Start: 2024-07-09 | End: 2024-07-11

## 2024-07-09 RX ORDER — FENTANYL CITRATE/PF 50 MCG/ML
50 SYRINGE (ML) INJECTION
Status: DISCONTINUED | OUTPATIENT
Start: 2024-07-09 | End: 2024-07-09 | Stop reason: HOSPADM

## 2024-07-09 RX ORDER — OXYCODONE HYDROCHLORIDE 10 MG/1
10 TABLET ORAL EVERY 4 HOURS PRN
Status: DISCONTINUED | OUTPATIENT
Start: 2024-07-09 | End: 2024-07-11 | Stop reason: HOSPADM

## 2024-07-09 RX ORDER — ONDANSETRON 2 MG/ML
4 INJECTION INTRAMUSCULAR; INTRAVENOUS EVERY 6 HOURS PRN
Status: DISCONTINUED | OUTPATIENT
Start: 2024-07-09 | End: 2024-07-11 | Stop reason: HOSPADM

## 2024-07-09 RX ORDER — HYDROMORPHONE HCL/PF 1 MG/ML
SYRINGE (ML) INJECTION AS NEEDED
Status: DISCONTINUED | OUTPATIENT
Start: 2024-07-09 | End: 2024-07-09

## 2024-07-09 RX ORDER — METHOCARBAMOL 500 MG/1
500 TABLET, FILM COATED ORAL EVERY 6 HOURS PRN
Status: DISCONTINUED | OUTPATIENT
Start: 2024-07-09 | End: 2024-07-09

## 2024-07-09 RX ORDER — METHOCARBAMOL 500 MG/1
500 TABLET, FILM COATED ORAL EVERY 6 HOURS SCHEDULED
Status: DISCONTINUED | OUTPATIENT
Start: 2024-07-09 | End: 2024-07-10

## 2024-07-09 RX ORDER — OXYCODONE HYDROCHLORIDE 5 MG/1
5 TABLET ORAL EVERY 4 HOURS PRN
Status: DISCONTINUED | OUTPATIENT
Start: 2024-07-09 | End: 2024-07-11 | Stop reason: HOSPADM

## 2024-07-09 RX ORDER — TAMSULOSIN HYDROCHLORIDE 0.4 MG/1
0.4 CAPSULE ORAL
Status: DISCONTINUED | OUTPATIENT
Start: 2024-07-09 | End: 2024-07-11 | Stop reason: HOSPADM

## 2024-07-09 RX ORDER — SODIUM CHLORIDE 9 MG/ML
INJECTION, SOLUTION INTRAVENOUS CONTINUOUS PRN
Status: DISCONTINUED | OUTPATIENT
Start: 2024-07-09 | End: 2024-07-09

## 2024-07-09 RX ORDER — MIDAZOLAM HYDROCHLORIDE 2 MG/2ML
INJECTION, SOLUTION INTRAMUSCULAR; INTRAVENOUS AS NEEDED
Status: DISCONTINUED | OUTPATIENT
Start: 2024-07-09 | End: 2024-07-09

## 2024-07-09 RX ORDER — HYDROMORPHONE HCL/PF 1 MG/ML
0.5 SYRINGE (ML) INJECTION
Status: DISCONTINUED | OUTPATIENT
Start: 2024-07-09 | End: 2024-07-09 | Stop reason: HOSPADM

## 2024-07-09 RX ORDER — LIDOCAINE HYDROCHLORIDE 10 MG/ML
INJECTION, SOLUTION EPIDURAL; INFILTRATION; INTRACAUDAL; PERINEURAL AS NEEDED
Status: DISCONTINUED | OUTPATIENT
Start: 2024-07-09 | End: 2024-07-09

## 2024-07-09 RX ORDER — PROPOFOL 10 MG/ML
INJECTION, EMULSION INTRAVENOUS CONTINUOUS PRN
Status: DISCONTINUED | OUTPATIENT
Start: 2024-07-09 | End: 2024-07-09

## 2024-07-09 RX ORDER — ONDANSETRON 2 MG/ML
INJECTION INTRAMUSCULAR; INTRAVENOUS AS NEEDED
Status: DISCONTINUED | OUTPATIENT
Start: 2024-07-09 | End: 2024-07-09

## 2024-07-09 RX ORDER — SODIUM CHLORIDE, SODIUM LACTATE, POTASSIUM CHLORIDE, CALCIUM CHLORIDE 600; 310; 30; 20 MG/100ML; MG/100ML; MG/100ML; MG/100ML
125 INJECTION, SOLUTION INTRAVENOUS CONTINUOUS
Status: DISCONTINUED | OUTPATIENT
Start: 2024-07-09 | End: 2024-07-09

## 2024-07-09 RX ORDER — ACETAMINOPHEN 325 MG/1
975 TABLET ORAL EVERY 6 HOURS SCHEDULED
Status: DISCONTINUED | OUTPATIENT
Start: 2024-07-09 | End: 2024-07-11 | Stop reason: HOSPADM

## 2024-07-09 RX ORDER — HYDROXYZINE HYDROCHLORIDE 25 MG/1
25 TABLET, FILM COATED ORAL ONCE
Status: CANCELLED | OUTPATIENT
Start: 2024-07-09

## 2024-07-09 RX ORDER — FENTANYL CITRATE 50 UG/ML
INJECTION, SOLUTION INTRAMUSCULAR; INTRAVENOUS AS NEEDED
Status: DISCONTINUED | OUTPATIENT
Start: 2024-07-09 | End: 2024-07-09

## 2024-07-09 RX ORDER — DEXAMETHASONE SODIUM PHOSPHATE 10 MG/ML
INJECTION, SOLUTION INTRAMUSCULAR; INTRAVENOUS AS NEEDED
Status: DISCONTINUED | OUTPATIENT
Start: 2024-07-09 | End: 2024-07-09

## 2024-07-09 RX ORDER — CEFAZOLIN SODIUM 2 G/50ML
2000 SOLUTION INTRAVENOUS ONCE
Status: COMPLETED | OUTPATIENT
Start: 2024-07-09 | End: 2024-07-09

## 2024-07-09 RX ADMIN — FENTANYL CITRATE 50 MCG: 50 INJECTION INTRAMUSCULAR; INTRAVENOUS at 12:08

## 2024-07-09 RX ADMIN — ROCURONIUM BROMIDE 30 MG: 50 INJECTION, SOLUTION INTRAVENOUS at 13:07

## 2024-07-09 RX ADMIN — ROCURONIUM BROMIDE 30 MG: 50 INJECTION, SOLUTION INTRAVENOUS at 14:04

## 2024-07-09 RX ADMIN — METHOCARBAMOL 500 MG: 500 TABLET ORAL at 23:04

## 2024-07-09 RX ADMIN — ROCURONIUM BROMIDE 20 MG: 50 INJECTION, SOLUTION INTRAVENOUS at 14:51

## 2024-07-09 RX ADMIN — SUGAMMADEX 200 MG: 100 INJECTION, SOLUTION INTRAVENOUS at 16:57

## 2024-07-09 RX ADMIN — PROPOFOL 200 MG: 10 INJECTION, EMULSION INTRAVENOUS at 12:08

## 2024-07-09 RX ADMIN — DEXAMETHASONE SODIUM PHOSPHATE 10 MG: 10 INJECTION, SOLUTION INTRAMUSCULAR; INTRAVENOUS at 12:08

## 2024-07-09 RX ADMIN — METHOCARBAMOL 500 MG: 500 TABLET ORAL at 20:13

## 2024-07-09 RX ADMIN — CEFAZOLIN SODIUM 2000 MG: 2 SOLUTION INTRAVENOUS at 12:41

## 2024-07-09 RX ADMIN — HYDROMORPHONE HYDROCHLORIDE 0.5 MG: 1 INJECTION, SOLUTION INTRAMUSCULAR; INTRAVENOUS; SUBCUTANEOUS at 17:40

## 2024-07-09 RX ADMIN — HYDROMORPHONE HYDROCHLORIDE 0.5 MG: 1 INJECTION, SOLUTION INTRAMUSCULAR; INTRAVENOUS; SUBCUTANEOUS at 17:15

## 2024-07-09 RX ADMIN — HYDROMORPHONE HYDROCHLORIDE 0.5 MG: 1 INJECTION, SOLUTION INTRAMUSCULAR; INTRAVENOUS; SUBCUTANEOUS at 17:25

## 2024-07-09 RX ADMIN — FENTANYL CITRATE 50 MCG: 50 INJECTION INTRAMUSCULAR; INTRAVENOUS at 12:51

## 2024-07-09 RX ADMIN — SODIUM CHLORIDE, SODIUM LACTATE, POTASSIUM CHLORIDE, AND CALCIUM CHLORIDE: .6; .31; .03; .02 INJECTION, SOLUTION INTRAVENOUS at 15:18

## 2024-07-09 RX ADMIN — SODIUM CHLORIDE, SODIUM LACTATE, POTASSIUM CHLORIDE, AND CALCIUM CHLORIDE 125 ML/HR: .6; .31; .03; .02 INJECTION, SOLUTION INTRAVENOUS at 19:05

## 2024-07-09 RX ADMIN — ROCURONIUM BROMIDE 50 MG: 50 INJECTION, SOLUTION INTRAVENOUS at 12:08

## 2024-07-09 RX ADMIN — ROCURONIUM BROMIDE 20 MG: 50 INJECTION, SOLUTION INTRAVENOUS at 12:58

## 2024-07-09 RX ADMIN — SODIUM CHLORIDE 10 MCG: 9 INJECTION, SOLUTION INTRAVENOUS at 12:13

## 2024-07-09 RX ADMIN — SODIUM CHLORIDE 10 MCG: 9 INJECTION, SOLUTION INTRAVENOUS at 13:07

## 2024-07-09 RX ADMIN — ACETAMINOPHEN 975 MG: 325 TABLET, FILM COATED ORAL at 23:04

## 2024-07-09 RX ADMIN — TAMSULOSIN HYDROCHLORIDE 0.4 MG: 0.4 CAPSULE ORAL at 22:56

## 2024-07-09 RX ADMIN — OXYCODONE HYDROCHLORIDE 10 MG: 10 TABLET ORAL at 22:56

## 2024-07-09 RX ADMIN — SODIUM CHLORIDE 10 MCG: 9 INJECTION, SOLUTION INTRAVENOUS at 12:58

## 2024-07-09 RX ADMIN — SODIUM CHLORIDE: 0.9 INJECTION, SOLUTION INTRAVENOUS at 12:19

## 2024-07-09 RX ADMIN — HYDROMORPHONE HYDROCHLORIDE 0.5 MG: 1 INJECTION, SOLUTION INTRAMUSCULAR; INTRAVENOUS; SUBCUTANEOUS at 17:20

## 2024-07-09 RX ADMIN — ACETAMINOPHEN 975 MG: 325 TABLET, FILM COATED ORAL at 18:44

## 2024-07-09 RX ADMIN — HYDROMORPHONE HYDROCHLORIDE 0.5 MG: 1 INJECTION, SOLUTION INTRAMUSCULAR; INTRAVENOUS; SUBCUTANEOUS at 20:52

## 2024-07-09 RX ADMIN — SODIUM CHLORIDE 10 MCG: 9 INJECTION, SOLUTION INTRAVENOUS at 14:51

## 2024-07-09 RX ADMIN — HYDROMORPHONE HYDROCHLORIDE 0.5 MG: 1 INJECTION, SOLUTION INTRAMUSCULAR; INTRAVENOUS; SUBCUTANEOUS at 15:18

## 2024-07-09 RX ADMIN — ONDANSETRON 4 MG: 2 INJECTION INTRAMUSCULAR; INTRAVENOUS at 20:13

## 2024-07-09 RX ADMIN — ONDANSETRON 4 MG: 2 INJECTION INTRAMUSCULAR; INTRAVENOUS at 16:25

## 2024-07-09 RX ADMIN — PROPOFOL 50 MCG/KG/MIN: 10 INJECTION, EMULSION INTRAVENOUS at 12:14

## 2024-07-09 RX ADMIN — ROCURONIUM BROMIDE 20 MG: 50 INJECTION, SOLUTION INTRAVENOUS at 16:09

## 2024-07-09 RX ADMIN — HYDROMORPHONE HYDROCHLORIDE 1 MG: 1 INJECTION, SOLUTION INTRAMUSCULAR; INTRAVENOUS; SUBCUTANEOUS at 12:59

## 2024-07-09 RX ADMIN — OXYCODONE HYDROCHLORIDE 10 MG: 10 TABLET ORAL at 18:43

## 2024-07-09 RX ADMIN — SODIUM CHLORIDE, SODIUM LACTATE, POTASSIUM CHLORIDE, AND CALCIUM CHLORIDE 125 ML/HR: .6; .31; .03; .02 INJECTION, SOLUTION INTRAVENOUS at 11:04

## 2024-07-09 RX ADMIN — MIDAZOLAM 2 MG: 1 INJECTION INTRAMUSCULAR; INTRAVENOUS at 11:59

## 2024-07-09 RX ADMIN — LIDOCAINE HYDROCHLORIDE 50 MG: 10 INJECTION, SOLUTION EPIDURAL; INFILTRATION; INTRACAUDAL; PERINEURAL at 12:08

## 2024-07-09 NOTE — INTERVAL H&P NOTE
H&P reviewed. After examining the patient I find no changes in the patients condition since the H&P had been written.    Vitals:    07/09/24 1041   BP: 120/74   Pulse: 69   Resp: 20   Temp: 98.5 °F (36.9 °C)   SpO2: 98%

## 2024-07-09 NOTE — OP NOTE
OPERATIVE REPORT  PATIENT NAME: Rivka Shepherd    :  1990  MRN: [id number]  Pt Location: [facility] OR ROOM 15    SURGERY DATE: 2024    Surgeons and Role:     * Sonal Jc MD - Primary     * Kamini Jose PA-C - Assisting    Preop Diagnosis:  Retroperitoneal mass [R19.00]    Post-Op Diagnosis Codes:     * Retroperitoneal mass [R19.00]    Procedure(s):  Left - ROBOTIC LEFT RETROPERITONEAL MASS EXCISION/ ADRENALECTOMY    Specimen(s):  ID Type Source Tests Collected by Time Destination   1 : Left adrenal mass Tissue Adrenal, Left TISSUE EXAM Sonal Jc MD 2024 1533        Estimated Blood Loss:   Minimal    Drains:  Closed/Suction Drain LUQ Bulb 19 Fr. (Active)   Number of days: 0       [REMOVED] Urethral Catheter Latex;Double-lumen 16 Fr. (Removed)   Number of days: 0       Anesthesia Type:   General    Operative Indications:  Retroperitoneal mass [R19.00]  LFT RP mass just superior to adrenal    Operative Findings:  As above    Complications:   None    Procedure and Technique:  The patient was identified in the operating suite and general endotracheal intubation achieved.  A Alfaro was placed.  The the patient was turned into the right-side-down position.  All pressure points were padded and an ax roll was placed.  The patient was affixed safely to the bed.  The bed was flexed to ensure maximal length between the left rib cage and the left ASIS.  The abdomen was then prepped and draped in the usual sterile fashion.  A timeout was performed.  At Garcia's point, a 0 degree 5 mm scope was advanced under direct visualization and the abdomen insufflated.  This was exchanged for an 8 mm port under direct visualization as long as placement of 3 additional 8 mm ports from the xiphoid to the left lower quadrant.  A 10 mm assist port was placed anterior to this.  Dissection was initiated at the descending colon, which was mobilized along the white line of Toldt, medializing the colon completely  to reveal the kidney mound.  We continued mobilization around the splenic flexure, completely mobilizing the splenic flexure and incising the omentum to further mobilized the colon inferiorly and medially.  We entered the lesser sac, coming inferior to the tail of the pancreas and spleen, medializing the pancreas and spleen along the lateral abdominal wall.  This revealed the structure superior to the kidney.  There was no distinct mass separate from the adrenal tissue.  We identified the left close by completely mobilizing the superior spleen fundus of the stomach.  The tissue just lateral to the left sudha was incised, revealing the masslike structure superior to the adrenal gland.  Again, the mass was indistinguishable from the adrenal gland and the decision was made to perform a left adrenalectomy, including the entire package of tissue superior to the kidney.  We initiated dissection at the renal hilum in order to elucidate the left adrenal vein.  This was very proximal on the kidney, demonstrating a long left adrenal vein.  This was circumferentially dissected, doubly clipped on the stay side and sharply divided.  We then continued dissection of the left adrenal gland from the kidney capsule and the remaining medial tissues.  This was accomplished with the Synchro seal device, carefully dividing all small vessels and preserving the superior branches of the renal artery.  Once dissection was complete, the specimen was placed in a specimen bag.  The field was copiously irrigated and found to be hemostatic.  Small bleeding branches of the splenic vein were controlled with hemostasis electrocautery as well as Surgiflo.  This hemostasis was thus confirmed.  A 19 [place] Jean was brought through the left lower quadrant port terminating at the resection bed.  The robot was then undocked.  The left lower quadrant assist port was lengthened the specimen delivered.  The site was then closed at the fascia in 1 layer with  interrupted 0 Vicryl figure-of-eight sutures.  All port sites were cleaned and dried and closed with 4-0 Monocryl followed by skin glue.  The patient tolerated the procedure well.     I was present for all critical portions of the procedure. and A physician assistant was required during the procedure for retraction, tissue handling, dissection and suturing as well as bedside robotic assistance.    Patient Disposition:  PACU         SIGNATURE: Sonal Jc MD  DATE: July 9, 2024  TIME: 5:01 PM

## 2024-07-09 NOTE — ANESTHESIA POSTPROCEDURE EVALUATION
Post-Op Assessment Note    CV Status:  Stable  Pain Score: 0    Pain management: adequate       Mental Status:  Awake   Hydration Status:  Stable   PONV Controlled:  None   Airway Patency:  Patent     Post Op Vitals Reviewed: Yes    No anethesia notable event occurred.    Staff: Anesthesiologist, CRNA               BP   133/75   Temp      Pulse  105   Resp   18   SpO2   100

## 2024-07-09 NOTE — QUICK NOTE
Post Op Check:    33 y.o. female Day of Surgery s/p robotic left retroperitoneal mass excision and adrenalectomy.  Patient is experiencing expected post-operative pain. They denied any nausea, chest pain, or shortness of breath. She had anxiety post-operatively which is improving without treatment.    Temp:  [98.2 °F (36.8 °C)-99.1 °F (37.3 °C)] 98.2 °F (36.8 °C)  HR:  [] 64  Resp:  [15-20] 15  BP: (120-140)/(71-91) 131/79      Physical Exam:  General: No acute distress, alert and oriented  CV: Well perfused, regular rate  Lungs: Normal work of breathing, no increased respiratory effort  Abdomen: Soft, appropriately tender, non-distended. Incision(s) clean, dry and intact.  Extremities: No edema, clubbing or cyanosis  Skin: Warm, dry      Plan:  Diet Regular; Regular House   overnight  Scheduled tylenol, robaxin. PRN oxycodone, dilaudid  PRN antiemetic  Encourage IS, OOB, ambulation    Rene Hopper MD  General Surgery   07/09/24

## 2024-07-10 LAB
AMYLASE FLD QL: 131 U/L
AMYLASE SERPL-CCNC: 50 IU/L (ref 29–103)
ANION GAP SERPL CALCULATED.3IONS-SCNC: 17 MMOL/L (ref 4–13)
BASOPHILS # BLD AUTO: 0.03 THOUSANDS/ÂΜL (ref 0–0.1)
BASOPHILS NFR BLD AUTO: 0 % (ref 0–1)
BUN SERPL-MCNC: 5 MG/DL (ref 5–25)
CALCIUM SERPL-MCNC: 8.5 MG/DL (ref 8.4–10.2)
CHLORIDE SERPL-SCNC: 101 MMOL/L (ref 96–108)
CO2 SERPL-SCNC: 24 MMOL/L (ref 21–32)
CREAT SERPL-MCNC: 0.47 MG/DL (ref 0.6–1.3)
EOSINOPHIL # BLD AUTO: 0.07 THOUSAND/ÂΜL (ref 0–0.61)
EOSINOPHIL NFR BLD AUTO: 1 % (ref 0–6)
ERYTHROCYTE [DISTWIDTH] IN BLOOD BY AUTOMATED COUNT: 15.1 % (ref 11.6–15.1)
GFR SERPL CREATININE-BSD FRML MDRD: 128 ML/MIN/1.73SQ M
GLUCOSE SERPL-MCNC: 88 MG/DL (ref 65–140)
HCT VFR BLD AUTO: 38.1 % (ref 34.8–46.1)
HGB BLD-MCNC: 12 G/DL (ref 11.5–15.4)
IMM GRANULOCYTES # BLD AUTO: 0.06 THOUSAND/UL (ref 0–0.2)
IMM GRANULOCYTES NFR BLD AUTO: 0 % (ref 0–2)
LYMPHOCYTES # BLD AUTO: 2.35 THOUSANDS/ÂΜL (ref 0.6–4.47)
LYMPHOCYTES NFR BLD AUTO: 16 % (ref 14–44)
MAGNESIUM SERPL-MCNC: 1.7 MG/DL (ref 1.9–2.7)
MCH RBC QN AUTO: 25 PG (ref 26.8–34.3)
MCHC RBC AUTO-ENTMCNC: 31.5 G/DL (ref 31.4–37.4)
MCV RBC AUTO: 79 FL (ref 82–98)
MONOCYTES # BLD AUTO: 1.15 THOUSAND/ÂΜL (ref 0.17–1.22)
MONOCYTES NFR BLD AUTO: 8 % (ref 4–12)
NEUTROPHILS # BLD AUTO: 11.01 THOUSANDS/ÂΜL (ref 1.85–7.62)
NEUTS SEG NFR BLD AUTO: 75 % (ref 43–75)
NRBC BLD AUTO-RTO: 0 /100 WBCS
PHOSPHATE SERPL-MCNC: 2.8 MG/DL (ref 2.7–4.5)
PLATELET # BLD AUTO: 288 THOUSANDS/UL (ref 149–390)
PMV BLD AUTO: 10.4 FL (ref 8.9–12.7)
POTASSIUM SERPL-SCNC: 4 MMOL/L (ref 3.5–5.3)
RBC # BLD AUTO: 4.8 MILLION/UL (ref 3.81–5.12)
SODIUM SERPL-SCNC: 142 MMOL/L (ref 135–147)
WBC # BLD AUTO: 14.67 THOUSAND/UL (ref 4.31–10.16)

## 2024-07-10 PROCEDURE — 80048 BASIC METABOLIC PNL TOTAL CA: CPT | Performed by: SURGERY

## 2024-07-10 PROCEDURE — 82150 ASSAY OF AMYLASE: CPT | Performed by: SURGERY

## 2024-07-10 PROCEDURE — 83735 ASSAY OF MAGNESIUM: CPT | Performed by: SURGERY

## 2024-07-10 PROCEDURE — 99024 POSTOP FOLLOW-UP VISIT: CPT | Performed by: STUDENT IN AN ORGANIZED HEALTH CARE EDUCATION/TRAINING PROGRAM

## 2024-07-10 PROCEDURE — 85025 COMPLETE CBC W/AUTO DIFF WBC: CPT | Performed by: SURGERY

## 2024-07-10 PROCEDURE — 84100 ASSAY OF PHOSPHORUS: CPT | Performed by: SURGERY

## 2024-07-10 RX ORDER — CELECOXIB 200 MG/1
200 CAPSULE ORAL DAILY
Status: DISCONTINUED | OUTPATIENT
Start: 2024-07-10 | End: 2024-07-11 | Stop reason: HOSPADM

## 2024-07-10 RX ORDER — GABAPENTIN 300 MG/1
300 CAPSULE ORAL 3 TIMES DAILY
Status: DISCONTINUED | OUTPATIENT
Start: 2024-07-10 | End: 2024-07-10

## 2024-07-10 RX ORDER — GABAPENTIN 300 MG/1
300 CAPSULE ORAL 3 TIMES DAILY
Status: DISCONTINUED | OUTPATIENT
Start: 2024-07-10 | End: 2024-07-11 | Stop reason: HOSPADM

## 2024-07-10 RX ORDER — GABAPENTIN 100 MG/1
100 CAPSULE ORAL 3 TIMES DAILY
Status: DISCONTINUED | OUTPATIENT
Start: 2024-07-10 | End: 2024-07-10

## 2024-07-10 RX ORDER — LIDOCAINE 50 MG/G
1 PATCH TOPICAL DAILY
Status: DISCONTINUED | OUTPATIENT
Start: 2024-07-10 | End: 2024-07-11 | Stop reason: HOSPADM

## 2024-07-10 RX ORDER — METHOCARBAMOL 500 MG/1
500 TABLET, FILM COATED ORAL EVERY 6 HOURS PRN
Status: DISCONTINUED | OUTPATIENT
Start: 2024-07-10 | End: 2024-07-11

## 2024-07-10 RX ADMIN — TAMSULOSIN HYDROCHLORIDE 0.4 MG: 0.4 CAPSULE ORAL at 17:46

## 2024-07-10 RX ADMIN — MAGNESIUM SULFATE HEPTAHYDRATE 3 G: 500 INJECTION, SOLUTION INTRAMUSCULAR; INTRAVENOUS at 10:20

## 2024-07-10 RX ADMIN — HYDROMORPHONE HYDROCHLORIDE 0.5 MG: 1 INJECTION, SOLUTION INTRAMUSCULAR; INTRAVENOUS; SUBCUTANEOUS at 19:17

## 2024-07-10 RX ADMIN — GABAPENTIN 300 MG: 300 CAPSULE ORAL at 09:11

## 2024-07-10 RX ADMIN — HYDROMORPHONE HYDROCHLORIDE 0.5 MG: 1 INJECTION, SOLUTION INTRAMUSCULAR; INTRAVENOUS; SUBCUTANEOUS at 15:01

## 2024-07-10 RX ADMIN — OXYCODONE HYDROCHLORIDE 10 MG: 10 TABLET ORAL at 13:32

## 2024-07-10 RX ADMIN — ONDANSETRON 4 MG: 2 INJECTION INTRAMUSCULAR; INTRAVENOUS at 19:17

## 2024-07-10 RX ADMIN — HYDROMORPHONE HYDROCHLORIDE 0.5 MG: 1 INJECTION, SOLUTION INTRAMUSCULAR; INTRAVENOUS; SUBCUTANEOUS at 10:20

## 2024-07-10 RX ADMIN — ONDANSETRON 4 MG: 2 INJECTION INTRAMUSCULAR; INTRAVENOUS at 02:27

## 2024-07-10 RX ADMIN — OXYCODONE HYDROCHLORIDE 10 MG: 10 TABLET ORAL at 04:49

## 2024-07-10 RX ADMIN — CELECOXIB 200 MG: 200 CAPSULE ORAL at 13:34

## 2024-07-10 RX ADMIN — OXYCODONE HYDROCHLORIDE 10 MG: 10 TABLET ORAL at 22:37

## 2024-07-10 RX ADMIN — ENOXAPARIN SODIUM 40 MG: 40 INJECTION SUBCUTANEOUS at 08:33

## 2024-07-10 RX ADMIN — HYDROMORPHONE HYDROCHLORIDE 0.5 MG: 1 INJECTION, SOLUTION INTRAMUSCULAR; INTRAVENOUS; SUBCUTANEOUS at 00:55

## 2024-07-10 RX ADMIN — METHOCARBAMOL 500 MG: 500 TABLET ORAL at 04:48

## 2024-07-10 RX ADMIN — ACETAMINOPHEN 975 MG: 325 TABLET, FILM COATED ORAL at 11:00

## 2024-07-10 RX ADMIN — GABAPENTIN 300 MG: 300 CAPSULE ORAL at 22:37

## 2024-07-10 RX ADMIN — HYDROMORPHONE HYDROCHLORIDE 0.5 MG: 1 INJECTION, SOLUTION INTRAMUSCULAR; INTRAVENOUS; SUBCUTANEOUS at 23:20

## 2024-07-10 RX ADMIN — HYDROMORPHONE HYDROCHLORIDE 0.5 MG: 1 INJECTION, SOLUTION INTRAMUSCULAR; INTRAVENOUS; SUBCUTANEOUS at 06:45

## 2024-07-10 RX ADMIN — LIDOCAINE 1 PATCH: 50 PATCH CUTANEOUS at 09:11

## 2024-07-10 RX ADMIN — OXYCODONE HYDROCHLORIDE 10 MG: 10 TABLET ORAL at 08:36

## 2024-07-10 RX ADMIN — METHOCARBAMOL 500 MG: 500 TABLET ORAL at 11:00

## 2024-07-10 RX ADMIN — SODIUM CHLORIDE, SODIUM LACTATE, POTASSIUM CHLORIDE, AND CALCIUM CHLORIDE 125 ML/HR: .6; .31; .03; .02 INJECTION, SOLUTION INTRAVENOUS at 02:27

## 2024-07-10 RX ADMIN — ACETAMINOPHEN 975 MG: 325 TABLET, FILM COATED ORAL at 23:20

## 2024-07-10 RX ADMIN — OXYCODONE HYDROCHLORIDE 10 MG: 10 TABLET ORAL at 17:46

## 2024-07-10 RX ADMIN — SODIUM CHLORIDE, SODIUM LACTATE, POTASSIUM CHLORIDE, AND CALCIUM CHLORIDE 125 ML/HR: .6; .31; .03; .02 INJECTION, SOLUTION INTRAVENOUS at 09:00

## 2024-07-10 RX ADMIN — GABAPENTIN 300 MG: 300 CAPSULE ORAL at 15:02

## 2024-07-10 RX ADMIN — ACETAMINOPHEN 975 MG: 325 TABLET, FILM COATED ORAL at 04:49

## 2024-07-10 NOTE — PROGRESS NOTES
"Progress Note - Surgical Oncology  Rivka Shepherd 33 y.o. female MRN: [id number]  Unit/Bed#: [facility] 911-01 Encounter: [id number]    Assessment:  33 y.o. female POD #1 s/p robotic left retroperitoneal mass excision and adrenalectomy.  Patient is hemodynamically stable and overall doing well this morning.     Plan:  -Likely discharge to home today  -Regular diet  -scheduled tylenol, robaxin. Prn Oxycodone, dilaudid  -prn antiemetic  -OOB/ambulate    Subjective/Objective     Subjective: No acute events overnight. Patient reports having 6/10 pain this morning that is improved with pain medications. Had one episode of vomiting last night but reports that nausea has improved since getting Zofran and did not have any more episodes of vomiting. Denies fevers, chills, CP, SOB. Denies BM or passing gas. Tolerating diet.     Objective:     Blood pressure 130/79, pulse 58, temperature 98.3 °F (36.8 °C), resp. rate 18, height 5' 5\" (1.651 m), weight 83 kg (183 lb), last menstrual period 07/01/2024, SpO2 97%, not currently breastfeeding.,Body mass index is 30.45 kg/m².      Intake/Output Summary (Last 24 hours) at 7/10/2024 0612  Last data filed at 7/10/2024 0451  Gross per 24 hour   Intake 4254.42 ml   Output 1970 ml   Net 2284.42 ml       Invasive Devices       Peripheral Intravenous Line  Duration             Peripheral IV 07/09/24 Dorsal (posterior);Left Hand <1 day    Peripheral IV 07/09/24 Right Hand <1 day              Drain  Duration             Closed/Suction Drain LUQ Bulb 19 Fr. <1 day                    Physical Exam:   General: No acute distress, alert and oriented  CV: Well perfused, regular rate  Lungs: Normal work of breathing, no increased respiratory effort  Abdomen: Soft, appropriately tender in epigastric region, non-distended. Incision(s) clean, dry and intact.LEONID in place with SS output. Dressing CDI  Extremities: No edema, clubbing or cyanosis  Skin: Warm, dry    Lab, Imaging and other studies:I have " personally reviewed pertinent lab results.    VTE Pharmacologic Prophylaxis: Enoxaparin (Lovenox)  VTE Mechanical Prophylaxis: sequential compression device

## 2024-07-10 NOTE — PLAN OF CARE
Problem: PAIN - ADULT  Goal: Verbalizes/displays adequate comfort level or baseline comfort level  Description: Interventions:  - Encourage patient to monitor pain and request assistance  - Assess pain using appropriate pain scale  - Administer analgesics based on type and severity of pain and evaluate response  - Implement non-pharmacological measures as appropriate and evaluate response  - Consider cultural and social influences on pain and pain management  - Notify physician/advanced practitioner if interventions unsuccessful or patient reports new pain  Outcome: Progressing     Problem: INFECTION - ADULT  Goal: Absence or prevention of progression during hospitalization  Description: INTERVENTIONS:  - Assess and monitor for signs and symptoms of infection  - Monitor lab/diagnostic results  - Monitor all insertion sites, i.e. indwelling lines, tubes, and drains  - Monitor endotracheal if appropriate and nasal secretions for changes in amount and color  - [place] appropriate cooling/warming therapies per order  - Administer medications as ordered  - Instruct and encourage patient and family to use good hand hygiene technique  - Identify and instruct in appropriate isolation precautions for identified infection/condition  Outcome: Progressing  Goal: Absence of fever/infection during neutropenic period  Description: INTERVENTIONS:  - Monitor WBC    Outcome: Progressing     Problem: SAFETY ADULT  Goal: Patient will remain free of falls  Description: INTERVENTIONS:  - Educate patient/family on patient safety including physical limitations  - Instruct patient to call for assistance with activity   - Consult OT/PT to assist with strengthening/mobility   - Keep Call bell within reach  - Keep bed low and locked with side rails adjusted as appropriate  - Keep care items and personal belongings within reach  - Initiate and maintain comfort rounds  - Make Fall Risk Sign visible to staff  - Offer Toileting every 2 Hours,  in advance of need  - Obtain necessary fall risk management equipment: call bell in reach  - Apply yellow socks and bracelet for high fall risk patients  - Consider moving patient to room near nurses station  Outcome: Progressing  Goal: Maintain or return to baseline ADL function  Description: INTERVENTIONS:  -  Assess patient's ability to carry out ADLs; assess patient's baseline for ADL function and identify physical deficits which impact ability to perform ADLs (bathing, care of mouth/teeth, toileting, grooming, dressing, etc.)  - Assess/evaluate cause of self-care deficits   - Assess range of motion  - Assess patient's mobility; develop plan if impaired  - Assess patient's need for assistive devices and provide as appropriate  - Encourage maximum independence but intervene and supervise when necessary  - Involve family in performance of ADLs  - Assess for home care needs following discharge   - Consider OT consult to assist with ADL evaluation and planning for discharge  - Provide patient education as appropriate  Outcome: Progressing  Goal: Maintains/Returns to pre admission functional level  Description: INTERVENTIONS:  - Perform AM-PAC 6 Click Basic Mobility/ Daily Activity assessment daily.  - Set and communicate daily mobility goal to care team and patient/family/caregiver.   - Collaborate with rehabilitation services on mobility goals if consulted  - Perform Range of Motion 3 times a day.  - Reposition patient every 2 hours.  - Dangle patient 3 times a day  - Stand patient 3 times a day  - Ambulate patient 3 times a day  - Out of bed to chair 3 times a day   - Out of bed for meals 3 times a day  - Out of bed for toileting  - Record patient progress and toleration of activity level   Outcome: Progressing     Problem: DISCHARGE PLANNING  Goal: Discharge to home or other facility with appropriate resources  Description: INTERVENTIONS:  - Identify barriers to discharge w/patient and caregiver  - Arrange for  needed discharge resources and transportation as appropriate  - Identify discharge learning needs (meds, wound care, etc.)  - Arrange for interpretive services to assist at discharge as needed  - Refer to Case Management Department for coordinating discharge planning if the patient needs post-hospital services based on physician/advanced practitioner order or complex needs related to functional status, cognitive ability, or social support system  Outcome: Progressing

## 2024-07-10 NOTE — UTILIZATION REVIEW
Initial Clinical Review    Elective INPT  surgical procedure  Age/Sex: 33 y.o. female  Surgery Date: 7/09  Procedure: Left - ROBOTIC LEFT RETROPERITONEAL MASS EXCISION/ ADRENALECTOMY   Anesthesia: general   Operative Findings: Retroperitoneal mass [R19.00]  LFT RP mass just superior to adrenal    OP NOTE: ...     Dissection was initiated at the descending colon, which was mobilized along the white line of T[place], medializing the colon completely to reveal the kidney mound. We continued mobilization around the splenic flexure, completely mobilizing the splenic flexure and incising the omentum to further mobilized the colon inferiorly and medially. We entered the lesser sac, coming inferior to the tail of the pancreas and spleen, medializing the pancreas and spleen along the lateral abdominal wall. This revealed the structure superior to the kidney. There was no distinct mass separate from the adrenal tissue. We identified the left close by completely mobilizing the superior spleen fundus of the stomach. The tissue just lateral to the left sudha was incised, revealing the masslike structure superior to the adrenal gland. Again, the mass was indistinguishable from the adrenal gland and the decision was made to perform a left adrenalectomy, including the entire package of tissue superior to the kidney. We initiated dissection at the renal hilum in order to elucidate the left adrenal vein. This was very proximal on the kidney, demonstrating a long left adrenal vein. This was circumferentially dissected, doubly clipped on the stay side and sharply divided. We then continued dissection of the left adrenal gland from the kidney capsule and the remaining medial tissues. This was accomplished with the Synchro seal device, carefully dividing all small vessels and preserving the superior branches of the renal artery. Once dissection was complete, the specimen was placed in a specimen bag. The field was copiously irrigated and  found to be hemostatic. Small bleeding branches of the splenic vein were controlled with hemostasis electrocautery as well as Surgiflo. This hemostasis was thus confirmed. A 19 Vietnamese Jean was brought through the left lower quadrant port terminating at the resection bed. The robot was then undocked. The left lower quadrant assist port was lengthened the specimen delivered.     POD#1 Progress Note: experiencing expected post-operative pain  6/10 this am.   one episode of vomiting last night but reports that nausea has improved since getting Zofran .    hemodynamically stable .     Admission Orders: Date/Time/Statement:   Admission Orders (From admission, onward)       Ordered        07/09/24 1717  Inpatient Admission  Once                          Orders Placed This Encounter   Procedures    Inpatient Admission     Standing Status:   Standing     Number of Occurrences:   1     Order Specific Question:   Level of Care     Answer:   Med Surg [16]     Order Specific Question:   Estimated length of stay     Answer:   More than 2 Midnights     Order Specific Question:   Certification     Answer:   I certify that inpatient services are medically necessary for this patient for a duration of greater than two midnights. See H&P and MD Progress Notes for additional information about the patient's course of treatment.       Vital Signs (last 3 days)       Date/Time Temp Pulse Resp BP MAP (mmHg) SpO2 O2 Flow Rate (L/min) O2 Device Cardiac (WDL) Madeline Coma Scale Score Pain    07/10/24 0645 -- -- -- -- -- -- -- -- -- -- 7    07/10/24 0449 -- -- -- -- -- -- -- -- -- -- 8    07/10/24 0055 -- -- -- -- -- -- -- -- -- -- 8 07/09/24 2256 -- -- -- -- -- -- -- -- -- -- 9    07/09/24 21:59:33 98.3 °F (36.8 °C) 58 -- 130/79 96 97 % -- -- -- -- --    07/09/24 2052 -- -- -- -- -- -- -- -- -- -- 8 07/09/24 1910 -- -- -- -- -- 95 % -- None (Room air) -- 15 6    07/09/24 1843 -- -- -- -- -- -- -- -- -- -- 7    07/09/24 18:34:45 98.2 °F  (36.8 °C) 64 18 131/79 96 94 % -- -- -- -- --    07/09/24 1815 -- 82 15 135/75 99 95 % -- -- -- -- --    07/09/24 1800 98.5 °F (36.9 °C) 82 17 133/71 97 93 % -- None (Room air) WDL 15 4    07/09/24 1745 -- 78 15 131/77 99 92 % -- -- -- -- 4 07/09/24 1740 -- -- -- -- -- -- -- -- -- -- 5 07/09/24 1730 -- 91 17 132/77 100 94 % -- -- -- 15 4 07/09/24 1725 -- -- -- -- -- -- -- -- -- -- 5 07/09/24 1720 -- -- -- -- -- 97 % -- -- -- -- 7 07/09/24 1715 -- 102 16 140/91 110 96 % -- None (Room air) -- -- 7 07/09/24 1707 99.1 °F (37.3 °C) 98 17 133/75 98 100 % 6 L/min Simple mask WDL 14 --    07/09/24 1041 98.5 °F (36.9 °C) 69 20 120/74 -- 98 % -- None (Room air) -- -- 1          Weight (last 2 days)       Date/Time Weight    07/09/24 18:34:45 83 (183)    07/09/24 1041 83 (183)            Pertinent Labs/Diagnostic Test Results:   Radiology:  No orders to display     Cardiology:  No orders to display     GI:  No orders to display           Results from last 7 days   Lab Units 07/10/24  0508   WBC Thousand/uL 14.67*   HEMOGLOBIN g/dL 12.0   HEMATOCRIT % 38.1   PLATELETS Thousands/uL 288   TOTAL NEUT ABS Thousands/µL 11.01*         Results from last 7 days   Lab Units 07/10/24  0508   SODIUM mmol/L 142   POTASSIUM mmol/L 4.0   CHLORIDE mmol/L 101   CO2 mmol/L 24   ANION GAP mmol/L 17*   BUN mg/dL 5   CREATININE mg/dL 0.47*   EGFR ml/min/1.73sq m 128   CALCIUM mg/dL 8.5   MAGNESIUM mg/dL 1.7*   PHOSPHORUS mg/dL 2.8             Results from last 7 days   Lab Units 07/10/24  0508   GLUCOSE RANDOM mg/dL 88       Diet: reg  Mobility: amb qid   DVT Prophylaxis: scd's     Medications/Pain Control:   Scheduled Medications:  acetaminophen, 975 mg, Oral, Q6H ONEIL  enoxaparin, 40 mg, Subcutaneous, Daily  magnesium sulfate, 3 g, Intravenous, Once  methocarbamol, 500 mg, Oral, Q6H ONEIL  tamsulosin, 0.4 mg, Oral, Daily With Dinner      Continuous IV Infusions:  lactated ringers, 125 mL/hr, Intravenous, Continuous      PRN  Meds:  HYDROmorphone, 0.5 mg, Intravenous, Q3H PRN  ondansetron, 4 mg, Intravenous, Q6H PRN  oxyCODONE, 10 mg, Oral, Q4H PRN  oxyCODONE, 5 mg, Oral, Q4H PRN        Network Utilization Review Department  ATTENTION: Please call with any questions or concerns to [phone] and carefully listen to the prompts so that you are directed to the right person. All voicemails are confidential.   For Discharge needs, contact Care Management DC Support Team at [phone] opt. 2  Send all requests for admission clinical reviews, approved or denied determinations and any other requests to dedicated fax number below belonging to the campus where the patient is receiving treatment. List of dedicated fax numbers for the Facilities:  FACILITY NAME UR FAX NUMBER   ADMISSION DENIALS (Administrative/Medical Necessity) [phone]   DISCHARGE SUPPORT TEAM (NETWORK) [phone]   PARENT CHILD HEALTH (Maternity/NICU/Pediatrics) [phone]   [facility] [phone]   [facility] [phone]   [facility] [phone]   [facility] [phone]   [facility] [phone]   [facility] [phone]   [facility] [phone]   [facility] [phone]   [facility] [phone]   [facility] [phone]   [facility] [phone]   [facility] [phone]

## 2024-07-10 NOTE — PLAN OF CARE
Problem: PAIN - ADULT  Goal: Verbalizes/displays adequate comfort level or baseline comfort level  Description: Interventions:  - Encourage patient to monitor pain and request assistance  - Assess pain using appropriate pain scale  - Administer analgesics based on type and severity of pain and evaluate response  - Implement non-pharmacological measures as appropriate and evaluate response  - Consider cultural and social influences on pain and pain management  - Notify physician/advanced practitioner if interventions unsuccessful or patient reports new pain  Outcome: Progressing     Problem: INFECTION - ADULT  Goal: Absence or prevention of progression during hospitalization  Description: INTERVENTIONS:  - Assess and monitor for signs and symptoms of infection  - Monitor lab/diagnostic results  - Monitor all insertion sites, i.e. indwelling lines, tubes, and drains  - Monitor endotracheal if appropriate and nasal secretions for changes in amount and color  - [place] appropriate cooling/warming therapies per order  - Administer medications as ordered  - Instruct and encourage patient and family to use good hand hygiene technique  - Identify and instruct in appropriate isolation precautions for identified infection/condition  Outcome: Progressing  Goal: Absence of fever/infection during neutropenic period  Description: INTERVENTIONS:  - Monitor WBC    Outcome: Progressing     Problem: SAFETY ADULT  Goal: Patient will remain free of falls  Description: INTERVENTIONS:  - Educate patient/family on patient safety including physical limitations  - Instruct patient to call for assistance with activity   - Consult OT/PT to assist with strengthening/mobility   - Keep Call bell within reach  - Keep bed low and locked with side rails adjusted as appropriate  - Keep care items and personal belongings within reach  - Initiate and maintain comfort rounds  - Make Fall Risk Sign visible to staff  - Consider moving patient to room  near nurses station  Outcome: Progressing  Goal: Maintain or return to baseline ADL function  Description: INTERVENTIONS:  -  Assess patient's ability to carry out ADLs; assess patient's baseline for ADL function and identify physical deficits which impact ability to perform ADLs (bathing, care of mouth/teeth, toileting, grooming, dressing, etc.)  - Assess/evaluate cause of self-care deficits   - Assess range of motion  - Assess patient's mobility; develop plan if impaired  - Assess patient's need for assistive devices and provide as appropriate  - Encourage maximum independence but intervene and supervise when necessary  - Involve family in performance of ADLs  - Assess for home care needs following discharge   - Consider OT consult to assist with ADL evaluation and planning for discharge  - Provide patient education as appropriate  Outcome: Progressing  Goal: Maintains/Returns to pre admission functional level  Description: INTERVENTIONS:  - Perform AM-PAC 6 Click Basic Mobility/ Daily Activity assessment daily.  - Set and communicate daily mobility goal to care team and patient/family/caregiver.   - Collaborate with rehabilitation services on mobility goals if consulted  - Out of bed for toileting  - Record patient progress and toleration of activity level   Outcome: Progressing     Problem: DISCHARGE PLANNING  Goal: Discharge to home or other facility with appropriate resources  Description: INTERVENTIONS:  - Identify barriers to discharge w/patient and caregiver  - Arrange for needed discharge resources and transportation as appropriate  - Identify discharge learning needs (meds, wound care, etc.)  - Arrange for interpretive services to assist at discharge as needed  - Refer to Case Management Department for coordinating discharge planning if the patient needs post-hospital services based on physician/advanced practitioner order or complex needs related to functional status, cognitive ability, or social support  system  Outcome: Progressing

## 2024-07-11 ENCOUNTER — TRANSITIONAL CARE MANAGEMENT (OUTPATIENT)
Dept: INTERNAL MEDICINE CLINIC | Facility: CLINIC | Age: 34
End: 2024-07-11

## 2024-07-11 VITALS
SYSTOLIC BLOOD PRESSURE: 132 MMHG | RESPIRATION RATE: 17 BRPM | WEIGHT: 183 LBS | TEMPERATURE: 98.2 F | OXYGEN SATURATION: 94 % | HEIGHT: 65 IN | BODY MASS INDEX: 30.49 KG/M2 | HEART RATE: 106 BPM | DIASTOLIC BLOOD PRESSURE: 91 MMHG

## 2024-07-11 LAB
ANION GAP SERPL CALCULATED.3IONS-SCNC: 8 MMOL/L (ref 4–13)
BASOPHILS # BLD AUTO: 0.05 THOUSANDS/ÂΜL (ref 0–0.1)
BASOPHILS NFR BLD AUTO: 1 % (ref 0–1)
BUN SERPL-MCNC: 4 MG/DL (ref 5–25)
CALCIUM SERPL-MCNC: 8 MG/DL (ref 8.4–10.2)
CHLORIDE SERPL-SCNC: 102 MMOL/L (ref 96–108)
CO2 SERPL-SCNC: 26 MMOL/L (ref 21–32)
CREAT SERPL-MCNC: 0.53 MG/DL (ref 0.6–1.3)
EOSINOPHIL # BLD AUTO: 0.09 THOUSAND/ÂΜL (ref 0–0.61)
EOSINOPHIL NFR BLD AUTO: 1 % (ref 0–6)
ERYTHROCYTE [DISTWIDTH] IN BLOOD BY AUTOMATED COUNT: 15.5 % (ref 11.6–15.1)
GFR SERPL CREATININE-BSD FRML MDRD: 123 ML/MIN/1.73SQ M
GLUCOSE SERPL-MCNC: 89 MG/DL (ref 65–140)
HCT VFR BLD AUTO: 38.2 % (ref 34.8–46.1)
HGB BLD-MCNC: 11.8 G/DL (ref 11.5–15.4)
IMM GRANULOCYTES # BLD AUTO: 0.02 THOUSAND/UL (ref 0–0.2)
IMM GRANULOCYTES NFR BLD AUTO: 0 % (ref 0–2)
LYMPHOCYTES # BLD AUTO: 2.45 THOUSANDS/ÂΜL (ref 0.6–4.47)
LYMPHOCYTES NFR BLD AUTO: 24 % (ref 14–44)
MCH RBC QN AUTO: 25 PG (ref 26.8–34.3)
MCHC RBC AUTO-ENTMCNC: 30.9 G/DL (ref 31.4–37.4)
MCV RBC AUTO: 81 FL (ref 82–98)
MONOCYTES # BLD AUTO: 0.71 THOUSAND/ÂΜL (ref 0.17–1.22)
MONOCYTES NFR BLD AUTO: 7 % (ref 4–12)
NEUTROPHILS # BLD AUTO: 6.98 THOUSANDS/ÂΜL (ref 1.85–7.62)
NEUTS SEG NFR BLD AUTO: 67 % (ref 43–75)
NRBC BLD AUTO-RTO: 0 /100 WBCS
PLATELET # BLD AUTO: 265 THOUSANDS/UL (ref 149–390)
PMV BLD AUTO: 9.8 FL (ref 8.9–12.7)
POTASSIUM SERPL-SCNC: 3.7 MMOL/L (ref 3.5–5.3)
RBC # BLD AUTO: 4.72 MILLION/UL (ref 3.81–5.12)
SODIUM SERPL-SCNC: 136 MMOL/L (ref 135–147)
WBC # BLD AUTO: 10.3 THOUSAND/UL (ref 4.31–10.16)

## 2024-07-11 PROCEDURE — 85025 COMPLETE CBC W/AUTO DIFF WBC: CPT

## 2024-07-11 PROCEDURE — 99024 POSTOP FOLLOW-UP VISIT: CPT | Performed by: STUDENT IN AN ORGANIZED HEALTH CARE EDUCATION/TRAINING PROGRAM

## 2024-07-11 PROCEDURE — NC001 PR NO CHARGE: Performed by: STUDENT IN AN ORGANIZED HEALTH CARE EDUCATION/TRAINING PROGRAM

## 2024-07-11 PROCEDURE — 80048 BASIC METABOLIC PNL TOTAL CA: CPT

## 2024-07-11 RX ORDER — OXYCODONE HYDROCHLORIDE 5 MG/1
5 TABLET ORAL EVERY 6 HOURS PRN
Qty: 16 TABLET | Refills: 0 | Status: SHIPPED | OUTPATIENT
Start: 2024-07-11 | End: 2024-07-15

## 2024-07-11 RX ORDER — LIDOCAINE 50 MG/G
1 PATCH TOPICAL DAILY
Qty: 7 PATCH | Refills: 0 | Status: SHIPPED | OUTPATIENT
Start: 2024-07-12 | End: 2024-07-19

## 2024-07-11 RX ORDER — ACETAMINOPHEN 325 MG/1
975 TABLET ORAL EVERY 8 HOURS SCHEDULED
Qty: 63 TABLET | Refills: 0 | Status: SHIPPED | OUTPATIENT
Start: 2024-07-11 | End: 2024-07-18

## 2024-07-11 RX ORDER — POTASSIUM CHLORIDE 20 MEQ/1
40 TABLET, EXTENDED RELEASE ORAL ONCE
Status: COMPLETED | OUTPATIENT
Start: 2024-07-11 | End: 2024-07-11

## 2024-07-11 RX ORDER — METHOCARBAMOL 500 MG/1
500 TABLET, FILM COATED ORAL EVERY 8 HOURS SCHEDULED
Qty: 21 TABLET | Refills: 0 | Status: SHIPPED | OUTPATIENT
Start: 2024-07-11 | End: 2024-07-18

## 2024-07-11 RX ORDER — METHOCARBAMOL 500 MG/1
500 TABLET, FILM COATED ORAL EVERY 6 HOURS SCHEDULED
Status: DISCONTINUED | OUTPATIENT
Start: 2024-07-11 | End: 2024-07-11 | Stop reason: HOSPADM

## 2024-07-11 RX ORDER — GABAPENTIN 300 MG/1
300 CAPSULE ORAL 3 TIMES DAILY
Qty: 21 CAPSULE | Refills: 0 | Status: SHIPPED | OUTPATIENT
Start: 2024-07-11 | End: 2024-07-18

## 2024-07-11 RX ORDER — CELECOXIB 200 MG/1
200 CAPSULE ORAL DAILY
Qty: 7 CAPSULE | Refills: 0 | Status: SHIPPED | OUTPATIENT
Start: 2024-07-12 | End: 2024-07-19

## 2024-07-11 RX ADMIN — ENOXAPARIN SODIUM 40 MG: 40 INJECTION SUBCUTANEOUS at 08:09

## 2024-07-11 RX ADMIN — OXYCODONE HYDROCHLORIDE 10 MG: 10 TABLET ORAL at 11:50

## 2024-07-11 RX ADMIN — LIDOCAINE 1 PATCH: 50 PATCH CUTANEOUS at 08:09

## 2024-07-11 RX ADMIN — METHOCARBAMOL 500 MG: 500 TABLET ORAL at 08:48

## 2024-07-11 RX ADMIN — GABAPENTIN 300 MG: 300 CAPSULE ORAL at 08:09

## 2024-07-11 RX ADMIN — CELECOXIB 200 MG: 200 CAPSULE ORAL at 08:10

## 2024-07-11 RX ADMIN — OXYCODONE HYDROCHLORIDE 10 MG: 10 TABLET ORAL at 03:15

## 2024-07-11 RX ADMIN — OXYCODONE HYDROCHLORIDE 10 MG: 10 TABLET ORAL at 08:09

## 2024-07-11 RX ADMIN — HYDROMORPHONE HYDROCHLORIDE 0.5 MG: 1 INJECTION, SOLUTION INTRAMUSCULAR; INTRAVENOUS; SUBCUTANEOUS at 05:44

## 2024-07-11 RX ADMIN — POTASSIUM CHLORIDE 40 MEQ: 1500 TABLET, EXTENDED RELEASE ORAL at 08:09

## 2024-07-11 RX ADMIN — ACETAMINOPHEN 975 MG: 325 TABLET, FILM COATED ORAL at 05:44

## 2024-07-11 RX ADMIN — ACETAMINOPHEN 975 MG: 325 TABLET, FILM COATED ORAL at 11:50

## 2024-07-11 NOTE — QUICK NOTE
LLQ LEONID removed in the usual fashion. Patient tolerated the procedure well and dressing was left over site.

## 2024-07-11 NOTE — DISCHARGE SUMMARY
Discharge Summary - Surgical Oncology  Rivka Shepherd 33 y.o. female MRN: [id number]  Unit/Bed#: [facility] 911-01 Encounter: [id number]    Admission Date: 7/9/2024     Discharge Date: 7/11/20214    Admitting Diagnosis: Retroperitoneal mass [R19.00]    Discharge Diagnosis: Retroperitoneal mass s/p robotic left retroperitoneal mass excision/adrenalectomy 7/9    Attending and Service: Dr. Jc, Surgical Oncology    Consulting Physician(s): none    Pathology: Adrenal mass biopsy, results pending    Hospital Course: Rivka Shepherd is a 33-year-old female who presented for an elective procedure for new RP/paraganglioma-appearing mass that was demonstrated on cross sectional imaging and subsequent MRI follow up. Patient is now s/p robotic left retroperitoneal mass excision/adrenalectomy on 7/9. A LEONID drain was left in place following the procedure. She tolerated the procedure well with no immediate complications. The patient had difficulties with pain control on 7/10 and some nausea and was kept overnight for observation and management of pain. LEONID drain was removed the morning of 7/11. On 7/11, patient reported significant pain improvement from the prior day, was tolerating a regular diet, and was deemed stable for discharge on an oral pain regiment.     On discharge, the patient is instructed to follow-up with Dr. Jc in surgical oncology on 7/25/2024 for post op appointment.     Condition at Discharge: good, hemodynamically stable    Discharge instructions/Information to patient and family:   See after visit summary for information provided to patient and family.      Provisions for Follow-Up Care:  See after visit summary for information related to follow-up care and any pertinent home health orders.      Disposition: Home    Planned Readmission: No    Discharge Statement   I spent 25 minutes discharging the patient. This time was spent on the day of discharge. I had direct contact with the patient on the day of  discharge. Additional documentation is required if more than 30 minutes were spent on discharge.     Discharge Medications:  See after visit summary for reconciled discharge medications provided to patient and family.    Benedicto Liang MD  7/11/2024  9:18 AM

## 2024-07-11 NOTE — DISCHARGE INSTR - AVS FIRST PAGE
Surgical Oncology Discharge Instructions    Please follow-up as instructed.      Change dressings as needed with saturation.    Activity:  - No lifting greater than 15 pounds or strenuous physical activity or exercise for 2 weeks.  - Walking and normal light activities are encouraged.  - Normal daily activities including climbing steps are okay.  - No driving until no longer using pain medications.    Return to work:    - You may return to work in 2 weeks or sooner if you are feeling well enough.    Diet:    - You may resume your normal diet.    Wound Care:  - May shower daily. No tub baths or swimming until cleared by your surgeon.  - Wash incision gently with soap and water and pat dry.  - Do not apply any creams or ointments unless instructed to do so by your surgeon.  - You may apply ice as needed (no longer than 20 minutes at a time) for the first 48 hours.  - Bruising is not unusual and will go away with a little time. You may apply a warm, moist compress that may help the bruising resolve quicker.  - You may remove the dressings the day after surgery (unless otherwise instructed). Leave any skin tapes (steri-strips) on the incision(s) in place until they fall off on their own. Any new dressings are optional.    Medications:    - You may resume all of your regular medications after discharge unless otherwise instructed. Please refer to your discharge medication list for further details.  - Please take the pain medications as directed.  - You are encouraged to use non-narcotic pain medications first and whenever possible. Reserve the use of narcotic pain medication for moderate to severe pain not controlled by non-narcotic medications.  - No driving while taking narcotic pain medications.  - You may become constipated, especially if taking pain medications. You may take any over the counter stool softeners or laxatives as needed. Examples: Milk of Magnesia, Colace, Senna.    Additional Instructions:  - If you  have any questions or concerns after discharge please call the office.  - Call office or return to ER if fever greater than 101, chills, persistent nausea/vomiting, worsening/uncontrollable pain, and/or increasing redness or purulent/foul smelling drainage from incision(s).

## 2024-07-11 NOTE — PROGRESS NOTES
"Progress Note - Surgical Oncology  Rivka Shepherd 33 y.o. female MRN: [id number]  Unit/Bed#: [facility] 911-01 Encounter: [id number]    Assessment:  33 y.o. female POD #2 s/p robotic left retroperitoneal mass excision and adrenalectomy.  Patient is hemodynamically stable and overall doing well this morning.     LEONID output: 293mL serous     Plan:  -Likely discharge to home today  -Regular diet  -Continue to monitor LEONID output  -multimodal pain regiment  -prn antiemetic  -OOB/ambulate    Subjective/Objective     Subjective: No acute events overnight. Patient's pain is better controlled this morning at a 5/10. Had some nausea overnight but denies vomiting, fevers, chills, SOB, chest pain. No BM yet, passing flatus. Voiding well. Tolerating regular diet.     Objective:     Blood pressure 132/93, pulse 85, temperature 98 °F (36.7 °C), resp. rate 13, height 5' 5\" (1.651 m), weight 83 kg (183 lb), last menstrual period 07/01/2024, SpO2 94%, not currently breastfeeding.,Body mass index is 30.45 kg/m².      Intake/Output Summary (Last 24 hours) at 7/11/2024 0524  Last data filed at 7/11/2024 0318  Gross per 24 hour   Intake --   Output 3793 ml   Net -3793 ml       Invasive Devices       Peripheral Intravenous Line  Duration             Peripheral IV 07/09/24 Dorsal (posterior);Left Hand 1 day    Peripheral IV 07/09/24 Right Hand 1 day              Drain  Duration             Closed/Suction Drain LUQ Bulb 19 Fr. 1 day                    Physical Exam:   General: No acute distress, alert and oriented  CV: Well perfused, regular rate  Lungs: Normal work of breathing, no increased respiratory effort  Abdomen: Soft, appropriately tender in epigastric region and LUQ, non-distended. Incision(s) clean, dry and intact. LEONID in place with Serous output. Dressing CDI  Extremities: No edema, clubbing or cyanosis  Skin: Warm, dry    Lab, Imaging and other studies:I have personally reviewed pertinent lab results.    VTE Pharmacologic " Prophylaxis: Enoxaparin (Lovenox)  VTE Mechanical Prophylaxis: sequential compression device

## 2024-07-12 ENCOUNTER — PATIENT MESSAGE (OUTPATIENT)
Dept: SURGICAL ONCOLOGY | Facility: CLINIC | Age: 34
End: 2024-07-12

## 2024-07-12 NOTE — UTILIZATION REVIEW
NOTIFICATION OF ADMISSION DISCHARGE   This is a Notification of Discharge from [facility]. Please be advised that this patient has been discharge from our facility. Below you will find the admission and discharge date and time including the patient’s disposition.   UTILIZATION REVIEW CONTACT:  Robert Ríos  Utilization   Network Utilization Review Department  Phone: [phone] x carefully listen to the prompts. All voicemails are confidential.  Email: [email or web address]     ADMISSION INFORMATION  PRESENTATION DATE: 7/9/2024 10:17 AM  OBERVATION ADMISSION DATE: N/A  INPATIENT ADMISSION DATE: 7/9/24  5:17 PM   DISCHARGE DATE: 7/11/2024  1:52 PM   DISPOSITION:Home/Self Care    Network Utilization Review Department  ATTENTION: Please call with any questions or concerns to [phone] and carefully listen to the prompts so that you are directed to the right person. All voicemails are confidential.   For Discharge needs, contact Care Management DC Support Team at [phone] opt. [phone]  Send all requests for admission clinical reviews, approved or denied determinations and any other requests to dedicated fax number below belonging to the campus where the patient is receiving treatment. List of dedicated fax numbers for the Facilities:  FACILITY NAME UR FAX NUMBER   ADMISSION DENIALS (Administrative/Medical Necessity) [phone]   DISCHARGE SUPPORT TEAM ([facility]) [phone]   PARENT CHILD HEALTH (Maternity/NICU/Pediatrics) [phone]   [facility] [phone]   [facility] [phone]   [facility] [phone]   [facility] [phone]   [facility] [phone]   [facility] [phone]   [facility] [phone]   [facility] [phone]   [facility]  [facility] [phone]   [facility] [phone]   [facility] [phone]   [facility] [phone]

## 2024-07-24 ENCOUNTER — OFFICE VISIT (OUTPATIENT)
Dept: INTERNAL MEDICINE CLINIC | Facility: CLINIC | Age: 34
End: 2024-07-24
Payer: COMMERCIAL

## 2024-07-24 VITALS
OXYGEN SATURATION: 98 % | HEART RATE: 74 BPM | HEIGHT: 65 IN | SYSTOLIC BLOOD PRESSURE: 118 MMHG | BODY MASS INDEX: 30.66 KG/M2 | WEIGHT: 184 LBS | DIASTOLIC BLOOD PRESSURE: 70 MMHG | TEMPERATURE: 97.8 F

## 2024-07-24 DIAGNOSIS — Z98.890 HISTORY OF SURGERY: Primary | ICD-10-CM

## 2024-07-24 DIAGNOSIS — R19.00 RETROPERITONEAL MASS: ICD-10-CM

## 2024-07-24 PROBLEM — K76.9 HEPATIC LESION: Status: RESOLVED | Noted: 2024-04-03 | Resolved: 2024-07-24

## 2024-07-24 PROCEDURE — 99495 TRANSJ CARE MGMT MOD F2F 14D: CPT | Performed by: INTERNAL MEDICINE

## 2024-07-24 NOTE — PROGRESS NOTES
INTERNAL MEDICINE OFFICE VISIT       NAME: Rivka Shepherd  AGE: 33 y.o. SEX: female       : 1990        MRN: [id number]    DATE: 2024  TIME: 10:35 AM    Assessment and Plan   1. History of surgery  Had Retroperitoneal mass s/p robotic left retroperitoneal mass excision/adrenalectomy    No complications  Post op labs looks stable    2. Retroperitoneal mass  Pathology result yet to be released  Has an appointment with the surgeon tomorrow      Chief Complaint   Post op review   Had Retroperitoneal mass s/p robotic left retroperitoneal mass excision/adrenalectomy      History of Present Illness   Rivka Shepherd is a 33 y.o.-year-old female who comes in today for a post op review. Had a Left sided retroperitoneal mass excision/adrenalectomy. She complains of some pain at surgical site and the left side of her back, graded 4/10, relieved on taking Tylenol. Otherwise, she has no other complaints, she's healing well, gradually resuming her physical activity, no change in bowel habits, no change of appetite, no nausea, no vomiting, she has no fevers, no chills. Julisa admits she's getting great support from her .       Review of Systems   No confusions, no LOC, no chest pains, no SOB, no cough  No change in bowel habits.    Active Problem List     Patient Active Problem List   Diagnosis    Anxiety    Retroperitoneal mass       The following portions of the patient's history were reviewed and updated as appropriate: allergies, current medications, past family history, past medical history, past social history, past surgical history, and problem list.    Objective     Vitals:    24 1004   BP: 118/70   Pulse: 74   Temp: 97.8 °F (36.6 °C)   SpO2: 98%     Wt Readings from Last 3 Encounters:   24 83.5 kg (184 lb)   24 83 kg (183 lb)   24 83.5 kg (184 lb)     Sitting up in bed, not in any obvious distress, not pale, well hydrated, no pitting edema.  Chest is clear with  vesicular breath sounds, no added sounds'  HS: S1, S2, no murmurs  Abd: full soft, slightly tender,moves with respiration  Surgical sites: clean and dry, no bleeding, no signs of infections.     Pertinent Laboratory/Diagnostic Studies:  I have reviewed pertinent labs:  CBC: normal healing process.   Lab Results   Component Value Date    WBC 10.30 (H) 07/11/2024    RBC 4.72 07/11/2024    HGB 11.8 07/11/2024    HCT 38.2 07/11/2024    MCV 81 (L) 07/11/2024     07/11/2024    MCH 25.0 (L) 07/11/2024    MCHC 30.9 (L) 07/11/2024    RDW 15.5 (H) 07/11/2024    MPV 9.8 07/11/2024    NEUTROABS 6.98 07/11/2024         No orders of the defined types were placed in this encounter.      ALLERGIES:  No Known Allergies    Current Medications     No current outpatient medications on file.     No current facility-administered medications for this visit.         Health Maintenance        Nikki Cortez MD

## 2024-07-25 ENCOUNTER — OFFICE VISIT (OUTPATIENT)
Dept: SURGICAL ONCOLOGY | Facility: CLINIC | Age: 34
End: 2024-07-25

## 2024-07-25 VITALS
OXYGEN SATURATION: 98 % | HEART RATE: 75 BPM | DIASTOLIC BLOOD PRESSURE: 83 MMHG | WEIGHT: 184 LBS | TEMPERATURE: 97.3 F | SYSTOLIC BLOOD PRESSURE: 112 MMHG | HEIGHT: 65 IN | RESPIRATION RATE: 21 BRPM | BODY MASS INDEX: 30.66 KG/M2

## 2024-07-25 DIAGNOSIS — R19.00 RETROPERITONEAL MASS: Primary | ICD-10-CM

## 2024-07-25 PROCEDURE — 99024 POSTOP FOLLOW-UP VISIT: CPT | Performed by: STUDENT IN AN ORGANIZED HEALTH CARE EDUCATION/TRAINING PROGRAM

## 2024-07-25 NOTE — ASSESSMENT & PLAN NOTE
S/P excision. Prelim path c/w ganglioneuroma. Sent for expert consult and awaiting final recs. Most likely will not need f/u imaging / surveillance. Will call pt with final recs.

## 2024-07-25 NOTE — PROGRESS NOTES
Surgical Oncology F/U    [facility] [place]  [address] [address] [phone]    Patient:  Rivka Shepherd  1990  [id number]    Primary Care provider:  Angela Emmanuel DO  [address], [address]  [place] [address]      ASSESSMENT AND PLAN    1. Retroperitoneal mass  Assessment & Plan:  S/P excision. Prelim path c/w ganglioneuroma. Sent for expert consult and awaiting final recs. Most likely will not need f/u imaging / surveillance. Will call pt with final recs.         Chief Complaint   Patient presents with    Post-op         History of Present Illness  :   34 yo female with new RP / paraganglioma-appearing mass. Underwent cross sectional imaging for PNA related to infant sickness recurrence. Demonstrated a superior LFT RP mass for which an MR was performed. This showed a cluster of nodules just medial to the adrenal with appearance of extra adrenal paraganglioma vs Lns vs RP fibrosis. Pt relatively asx. Complains of occasional abd pain as well as RT back pain and LLQ pain. No signirficant GI sx. No sx c/f pheo. No fatigue, weight loss, bone pain, flushing, fevers, night sweats. Since last visit - hormone studies negative. IR bx demonstrated ganglioneuroma. She is now s/p LFT adrenalectomy with en bloc resection of mass. Postop course prolonged 2/2 pain control. Doing well at home with no issues. Occasional pain but no infectious sx.     Review of Systems  Complete ROS Surg Onc:   Constitutional: The patient denies new or recent history of general fatigue, no recent weight loss, no change in appetite.   Eyes: No complaints of visual problems, no scleral icterus.   ENT: No complaints of ear pain, no hoarseness, no difficulty swallowing,  no tinnitus and no new masses in head, oral cavity, or neck.   Cardiovascular: No complaints of chest pain, no palpitations, no ankle edema.   Respiratory: No complaints  of shortness of breath, no cough.   Gastrointestinal: No complaints of jaundice, no bloody stools, no pale stools.   Genitourinary: No complaints of dysuria, no hematuria, no nocturia, no frequent urination, no urethral discharge.   Musculoskeletal: No complaints of weakness, paralysis, joint stiffness or arthralgias.  Integumentary: No complaints of rash, no new lesions.   Neurological: No complaints of convulsions, no seizures, no dizziness.   Hematologic/Lymphatic: No complaints of easy bruising.   Endocrine:  No hot or cold intolerance.  No polydipsia, polyphagia, or polyuria.  Allergy/immunology:  No environmental allergies.  No food allergies.  Not immunocompromised.      Patient Active Problem List   Diagnosis    Anxiety    Retroperitoneal mass     Past Medical History:   Diagnosis Date    Anemia     Anxiety     Gastroesophageal reflux in pregnancy 2022    General counseling and advice on female contraception 06/15/2023    DMPA sent to [facility] with refills for 1 year    Melasma of pregnancy 2022    Migraine     Postpartum care following  delivery 2023    Pyelectasis of fetus on prenatal ultrasound 2023    Per [facility] US on 23: The left kidney shows minimal pyelectasis to 7 mm. There is no evidence for caliectasis or hydronephrosis. Recommend a  US at least 72 hrs after birth.     RhD negative 2023    s/p rhogam at 29 weeks    Varicella      Past Surgical History:   Procedure Laterality Date    ADRENALECTOMY LAPAROSCOPIC Left 2024    Procedure: ROBOTIC LEFT RETROPERITONEAL MASS EXCISION/ ADRENALECTOMY;  Surgeon: Sonal Jc MD;  Location: [facility];  Service: Surgical Oncology    COLONOSCOPY      IR BIOPSY RETROPERITONEUM  2024    KNEE ARTHROSCOPY Left     [place]  DELIVERY ONLY N/A 2023    Procedure:  SECTION ();  Surgeon: Neena Yip MD;  Location: [facility];  Service: Obstetrics    TONSILLECTOMY       UPPER GASTROINTESTINAL ENDOSCOPY  2005    WISDOM TOOTH EXTRACTION  2009     Family History   Problem Relation Age of Onset    Anxiety disorder Mother     Heart disease Father     ADD / ADHD Sister     Anxiety disorder Sister     Lymphoma Maternal Grandmother     Anxiety disorder Maternal Grandmother     Stroke Paternal Grandmother     Heart attack Paternal Grandfather     Heart disease Paternal Grandfather      Social History     Socioeconomic History    Marital status: /Civil Union     Spouse name: Not on file    Number of children: Not on file    Years of education: Not on file    Highest education level: Not on file   Occupational History    Not on file   Tobacco Use    Smoking status: Former     Current packs/day: 0.00     Types: Cigarettes     Quit date: 2022     Years since quittin.8    Smokeless tobacco: Never   Vaping Use    Vaping status: Never Used   Substance and Sexual Activity    Alcohol use: Yes     Alcohol/week: 1.0 standard drink of alcohol     Types: 1 Glasses of wine per week     Comment: twice per week    Drug use: Yes     Types: Marijuana     Comment: denies IVDA-daily Used THC  at 2230    Sexual activity: Yes     Partners: Male     Birth control/protection: Condom Male   Other Topics Concern    Not on file   Social History Narrative     for [employer] and around     Social Determinants of Health     Financial Resource Strain: Low Risk  (10/27/2022)    Overall Financial Resource Strain (CARDIA)     Difficulty of Paying Living Expenses: Not hard at all   Food Insecurity: No Food Insecurity (10/27/2022)    Hunger Vital Sign     Worried About Running Out of Food in the Last Year: Never true     Ran Out of Food in the Last Year: Never true   Transportation Needs: No Transportation Needs (10/27/2022)    PRAPARE - Transportation     Lack of Transportation (Medical): No     Lack of Transportation (Non-Medical): No   Physical Activity: Not on file   Stress: No Stress  Concern Present (11/8/2022)    [employer] - Occupational Stress Questionnaire     Feeling of Stress : Only a little   Social Connections: Not on file   Intimate Partner Violence: Not At Risk (11/8/2022)    Humiliation, Afraid, Rape, and Kick questionnaire     Fear of Current or Ex-Partner: No     Emotionally Abused: No     Physically Abused: No     Sexually Abused: No   Housing Stability: Low Risk  (11/8/2022)    Housing Stability Vital Sign     Unable to Pay for Housing in the Last Year: No     Number of Places Lived in the Last Year: 1     Unstable Housing in the Last Year: No     No current outpatient medications on file.  No Known Allergies    Vitals:    07/25/24 1043   BP: 112/83   Pulse: 75   Resp: 21   Temp: (!) 97.3 °F (36.3 °C)   SpO2: 98%       Physical Exam   General: Appears well, appears stated age  Skin: Warm, anicteric. Incisions well healed  HEENT: Normocephalic, atraumatic; sclera aniceteric, mucous membranes moist; cervical nodes without adenopathy  Cardiopulmonary: RRR, Easy WOB, no BLE edema  Abd: Flat and soft, nontender, no masses appreciated, no hepatosplenomegaly  MSK: Symmetric, no cyanosis, no overt weakness  Lymphatic: No cervical, axillary or inguinal lymphadenopathy  Neuro: Affect appropriate, no gross motor abnormalities      Labs: Reviewed in [facility]    Preliminary Diagnosis   Outside expert confirmation pending     A.  Left adrenal gland mass (excision / adrenalectomy):     - Features suggesting ganglioneuroma (6.4 cm).            Imaging  MRI abdomen w wo contrast    Result Date: 4/25/2024  Narrative: MRI - ABDOMEN - WITH AND WITHOUT CONTRAST INDICATION: 33 years / Female. R93.5: Abnormal findings on diagnostic imaging of other abdominal regions, including retroperitoneum K76.9: Liver disease, unspecified K63.89: Other specified diseases of intestine. Abnormal abdominopelvic CT. Possible liver lesion and left retroperitoneal/paravertebral nodules  COMPARISON: 3/21/2024 abdominopelvic CT report from [facility]. Images are not available for direct comparison. TECHNIQUE: Multiplanar/multisequence MRI of the abdomen was performed before and after administration of contrast. IV Contrast: 8 mL of Gadobutrol injection (SINGLE-DOSE) FINDINGS: Mammogram: LOWER CHEST:   Within normal limits. LIVER: Normal signal intensity and morphology. No suspicious mass. Solitary, 0.9 cm, circumscribed inferior right lobe posterior segment nodule. Typical signal intensity and enhancement of benign hemangioma (8/29 Patent hepatic and portal veins. BILE DUCTS:  Normal caliber and morphology. GALLBLADDER: High T1w signal intensity luminal contents suggesting sludge. No focal filling defects, wall thickening or pericholecystic fluid. SPLEEN:  Within normal limits. PANCREAS:  Within normal limits. Normal pancreatic duct caliber and morphology. ADRENAL GLANDS:  Within normal limits. Left retroperitoneal mass appears separate from the left adrenal. Please see description below. KIDNEYS/PROXIMAL URETERS:  Within normal limits. BOWEL:   Within normal limits. PERITONEUM/RETROPERITONEUM: No ascites or fluid collections. Approximately 4.6 x 5.7 x 2.0 cm (length X depth X width) somewhat oval-shaped, masslike opacity in the left retroperitoneum, lateral to the diaphragmatic sudha and medial to the left adrenal gland (5/12 and 13/70). This is measured today as a solitary mass. On the CT, was described as a cluster of soft tissue appearing nodularities.  Could be small several soft tissue nodule nodules  by normal retroperitoneal fat versus single mass composed of soft tissue and fat. Soft tissue components are homogeneous, similar signal intensity to spleen, and have homogeneous enhancement, greatest on delayed phase. LYMPH NODES: No separate lymphadenopathy in the retroperitoneum or mesentery. VASCULAR STRUCTURES:  Within normal limits. Retroaortic left renal vein.  BONES:  No suspicious osseous lesion. ABDOMINAL WALL:  Within normal limits. VISUALIZED PELVIC STRUCTURES: Within normal limits.     Impression: Small, benign hepatic hemangioma. No suspicious liver mass. Left retroperitoneal mass versus clustered nodules. Detailed description above. Solitary mass favored on this exam. Please compare to prior CT which has better spatial resolution. The differential diagnosis would include clustered lymph nodes, extra-adrenal myelolipoma, well-differentiated liposarcoma, and primary retroperitoneal teratoma. Extramedullary hematopoiesis and hibernoma can also be considered, probably less likely. Tissue diagnosis may be indicated. Workstation performed: [id number]       I independently reviewed and interpreted the available laboratory and imaging data incl CT, MR, hosp course, labs, bx, endo studies

## 2024-07-31 ENCOUNTER — OFFICE VISIT (OUTPATIENT)
Dept: INTERNAL MEDICINE CLINIC | Facility: CLINIC | Age: 34
End: 2024-07-31
Payer: COMMERCIAL

## 2024-07-31 VITALS
HEIGHT: 65 IN | WEIGHT: 185.4 LBS | TEMPERATURE: 97.5 F | SYSTOLIC BLOOD PRESSURE: 114 MMHG | DIASTOLIC BLOOD PRESSURE: 70 MMHG | BODY MASS INDEX: 30.89 KG/M2 | HEART RATE: 83 BPM | RESPIRATION RATE: 16 BRPM | OXYGEN SATURATION: 97 %

## 2024-07-31 DIAGNOSIS — F41.9 ANXIETY: Primary | ICD-10-CM

## 2024-07-31 DIAGNOSIS — R19.00 RETROPERITONEAL MASS: Primary | ICD-10-CM

## 2024-07-31 DIAGNOSIS — Z11.59 NEED FOR HEPATITIS C SCREENING TEST: ICD-10-CM

## 2024-07-31 DIAGNOSIS — Z13.6 SCREENING FOR CARDIOVASCULAR CONDITION: ICD-10-CM

## 2024-07-31 DIAGNOSIS — Z00.00 ANNUAL PHYSICAL EXAM: ICD-10-CM

## 2024-07-31 PROCEDURE — 99213 OFFICE O/P EST LOW 20 MIN: CPT | Performed by: INTERNAL MEDICINE

## 2024-07-31 PROCEDURE — 99395 PREV VISIT EST AGE 18-39: CPT | Performed by: INTERNAL MEDICINE

## 2024-07-31 RX ORDER — ACETAMINOPHEN 500 MG
500 TABLET ORAL EVERY 6 HOURS PRN
COMMUNITY

## 2024-07-31 NOTE — PROGRESS NOTES
Adult Annual Physical  Name: Rivka Shepherd      : 1990      MRN: [id number]  Encounter Provider: Anglea Emmanuel DO  Encounter Date: 2024   Encounter department: [facility] INTERNAL MEDICINE    Assessment & Plan   1. Anxiety  Assessment & Plan:  -moderate in severity  -agreeable to starting sertraline 50mg daily, side effects discussed  -follow up in one month  Orders:  -     sertraline (ZOLOFT) 50 mg tablet; Take 0.5 tablets (25 mg total) by mouth daily for 7 days, THEN 1 tablet (50 mg total) daily for 23 days.  2. Annual physical exam  3. Screening for cardiovascular condition  -     Lipid panel; Future; Expected date: 2024  4. Need for hepatitis C screening test  -     Hepatitis C antibody; Future; Expected date: 2024  Immunizations and preventive care screenings were discussed with patient today. Appropriate education was printed on patient's after visit summary.    Counseling:  Alcohol/drug use: discussed moderation in alcohol intake, the recommendations for healthy alcohol use, and avoidance of illicit drug use.  Dental Health: discussed importance of regular tooth brushing, flossing, and dental visits.  Exercise: the importance of regular exercise/physical activity was discussed. Recommend exercise 3-5 times per week for at least 30 minutes.   Immunizations discussed.  Recommend yearly influenza vaccine, updated COVID vaccine.  She is up to date tdap and HPV series.  Cancer screenings discussed.  PAP per GYN.      Depression Screening and Follow-up Plan: Patient was screened for depression during today's encounter. They screened negative with a PHQ-2 score of 0.        History of Present Illness     Adult Annual Physical:  Patient presents for annual physical.   Had L retroperitoneal mass and adrenalectomy on . 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  Taking tylenol for abdominal pain.    Has had more anxiety during preop time frame.  Recalls she was briefly placed anxiety  medication for one month but self discontinued..     Diet and Physical Activity:  - Diet/Nutrition:. regular diet  - Exercise: walking.    Depression Screening:  - PHQ-2 Score: 0    General Health:  - Sleep: sleeps poorly. she is a stomach sleeper but since her surgery has not been able to do so  - Vision: wears glasses.  - Dental: brushes teeth twice daily and no dental visits for > 1 year.    /GYN Health:  - Follows with GYN: yes.   - Menopause: premenopausal.       Review of Systems   Constitutional:  Negative for fever.   HENT:  Negative for congestion, nosebleeds and postnasal drip.    Respiratory:  Negative for cough, shortness of breath and wheezing.    Cardiovascular:  Negative for chest pain and leg swelling.   Gastrointestinal:  Positive for abdominal pain. Negative for constipation, diarrhea, nausea and vomiting.   Genitourinary:  Negative for difficulty urinating and menstrual problem.   Skin:  Positive for wound. Negative for color change, pallor and rash.   Neurological:  Positive for numbness (R hand). Negative for dizziness and headaches.   Psychiatric/Behavioral:  Positive for sleep disturbance. Negative for dysphoric mood. The patient is nervous/anxious.      Medical History Reviewed by provider this encounter:  Tobacco  Allergies  Meds  Problems  Med Hx  Surg Hx  Fam Hx       Current Outpatient Medications on File Prior to Visit   Medication Sig Dispense Refill   • acetaminophen (TYLENOL) 500 mg tablet Take 500 mg by mouth every 6 (six) hours as needed for mild pain       No current facility-administered medications on file prior to visit.      Social History     Tobacco Use   • Smoking status: Former     Current packs/day: 0.00     Types: Cigarettes     Quit date: 2022     Years since quittin.9   • Smokeless tobacco: Never   Vaping Use   • Vaping status: Never Used   Substance and Sexual Activity   • Alcohol use: Yes     Alcohol/week: 1.0 standard drink of alcohol     Types: 1  "Glasses of wine per week     Comment: twice per week   • Drug use: Yes     Types: Marijuana     Comment: denies IVDA-daily Used THC 7/8 at 2230   • Sexual activity: Yes     Partners: Male     Birth control/protection: Condom Male       Objective     /70 (BP Location: Left arm, Patient Position: Sitting, Cuff Size: Standard)   Pulse 83   Temp 97.5 °F (36.4 °C) (Tympanic)   Resp 16   Ht 5' 5\" (1.651 m)   Wt 84.1 kg (185 lb 6.4 oz)   SpO2 97%   BMI 30.85 kg/m²     Physical Exam  Vitals reviewed.   Constitutional:       General: She is not in acute distress.  HENT:      Head: Normocephalic.      Right Ear: Tympanic membrane and ear canal normal.      Left Ear: Ear canal normal.      Nose: Nose normal.      Mouth/Throat:      Mouth: Mucous membranes are moist.   Eyes:      Extraocular Movements: Extraocular movements intact.      Conjunctiva/sclera: Conjunctivae normal.      Pupils: Pupils are equal, round, and reactive to light.      Comments: Wears glasses   Neck:      Thyroid: No thyromegaly or thyroid tenderness.   Cardiovascular:      Rate and Rhythm: Normal rate and regular rhythm.      Pulses: Normal pulses.      Heart sounds: Normal heart sounds.   Pulmonary:      Effort: Pulmonary effort is normal. No respiratory distress.      Breath sounds: Normal breath sounds. No wheezing.   Abdominal:      General: Bowel sounds are normal. There is no distension.      Palpations: Abdomen is soft.      Tenderness: There is no abdominal tenderness.      Comments: Trochar incisions healed   Musculoskeletal:      Cervical back: Neck supple. No tenderness.      Right lower leg: No edema.      Left lower leg: No edema.   Lymphadenopathy:      Cervical: No cervical adenopathy.   Skin:     Coloration: Skin is not pale.   Neurological:      General: No focal deficit present.      Mental Status: She is alert and oriented to person, place, and time.   Psychiatric:         Mood and Affect: Mood normal.           "

## 2024-07-31 NOTE — PATIENT INSTRUCTIONS
"Patient Education     Routine physical for adults   The Basics   Written by the doctors and editors at [facility]   What is a physical? -- A physical is a routine visit, or \"check-up,\" with your doctor. You might also hear it called a \"wellness visit\" or \"preventive visit.\"  During each visit, the doctor will:   Ask about your physical and mental health   Ask about your habits, behaviors, and lifestyle   Do an exam   Give you vaccines if needed   Talk to you about any medicines you take   Give advice about your health   Answer your questions  Getting regular check-ups is an important part of taking care of your health. It can help your doctor find and treat any problems you have. But it's also important for preventing health problems.  A routine physical is different from a \"sick visit.\" A sick visit is when you see a doctor because of a health concern or problem. Since physicals are scheduled ahead of time, you can think about what you want to ask the doctor.  How often should I get a physical? -- It depends on your age and health. In general, for people age 21 years and older:   If you are younger than 50 years, you might be able to get a physical every 3 years.   If you are 50 years or older, your doctor might recommend a physical every year.  If you have an ongoing health condition, like diabetes or high blood pressure, your doctor will probably want to see you more often.  What happens during a physical? -- In general, each visit will include:   Physical exam - The doctor or nurse will check your height, weight, heart rate, and blood pressure. They will also look at your eyes and ears. They will ask about how you are feeling and whether you have any symptoms that bother you.   Medicines - It's a good idea to bring a list of all the medicines you take to each doctor visit. Your doctor will talk to you about your medicines and answer any questions. Tell them if you are having any side effects that bother you. You " "should also tell them if you are having trouble paying for any of your medicines.   Habits and behaviors - This includes:   Your diet   Your exercise habits   Whether you smoke, drink alcohol, or use drugs   Whether you are sexually active   Whether you feel safe at home  Your doctor will talk to you about things you can do to improve your health and lower your risk of health problems. They will also offer help and support. For example, if you want to quit smoking, they can give you advice and might prescribe medicines. If you want to improve your diet or get more physical activity, they can help you with this, too.   Lab tests, if needed - The tests you get will depend on your age and situation. For example, your doctor might want to check your:   Cholesterol   Blood sugar   Iron level   Vaccines - The recommended vaccines will depend on your age, health, and what vaccines you already had. Vaccines are very important because they can prevent certain serious or deadly infections.   Discussion of screening - \"Screening\" means checking for diseases or other health problems before they cause symptoms. Your doctor can recommend screening based on your age, risk, and preferences. This might include tests to check for:   Cancer, such as breast, prostate, cervical, ovarian, colorectal, prostate, lung, or skin cancer   Sexually transmitted infections, such as chlamydia and gonorrhea   Mental health conditions like depression and anxiety  Your doctor will talk to you about the different types of screening tests. They can help you decide which screenings to have. They can also explain what the results might mean.   Answering questions - The physical is a good time to ask the doctor or nurse questions about your health. If needed, they can refer you to other doctors or specialists, too.  Adults older than 65 years often need other care, too. As you get older, your doctor will talk to you about:   How to prevent falling at " home   Hearing or vision tests   Memory testing   How to take your medicines safely   Making sure that you have the help and support you need at home  All topics are updated as new evidence becomes available and our peer review process is complete.  This topic retrieved from [employer] on: May 02, 2024.  Topic 354094 Version 1.0  Release: 32.4.3 - C32.122  © 2024 UpToDate, Inc. and/or its affiliates. All rights reserved.  Consumer Information Use and Disclaimer   Disclaimer: This generalized information is a limited summary of diagnosis, treatment, and/or medication information. It is not meant to be comprehensive and should be used as a tool to help the user understand and/or assess potential diagnostic and treatment options. It does NOT include all information about conditions, treatments, medications, side effects, or risks that may apply to a specific patient. It is not intended to be medical advice or a substitute for the medical advice, diagnosis, or treatment of a health care provider based on the health care provider's examination and assessment of a patient's specific and unique circumstances. Patients must speak with a health care provider for complete information about their health, medical questions, and treatment options, including any risks or benefits regarding use of medications. This information does not endorse any treatments or medications as safe, effective, or approved for treating a specific patient. UpToDate, Inc. and its affiliates disclaim any warranty or liability relating to this information or the use thereof.The use of this information is governed by the Terms of Use, available at https://www.woltersExoteluwer.com/en/know/clinical-effectiveness-terms. 2024© UpToDate, Inc. and its affiliates and/or licensors. All rights reserved.  Copyright   © 2024 UpToDate, Inc. and/or its affiliates. All rights reserved.

## 2024-07-31 NOTE — ASSESSMENT & PLAN NOTE
-moderate in severity  -agreeable to starting sertraline 50mg daily, side effects discussed  -follow up in one month

## 2024-08-02 ENCOUNTER — TELEPHONE (OUTPATIENT)
Dept: SURGICAL ONCOLOGY | Facility: CLINIC | Age: 34
End: 2024-08-02

## 2024-08-02 NOTE — TELEPHONE ENCOUNTER
Called patient and spoke to Rivka, we were able to get her scheduled for her CT scan as well as her office visit with Dr. Jc following the scan. I stated that she will need to drink the barium for this scan and advised on where she can pick it up and that they can give her the instructions as well. I confirmed the date, time and location of each appointment and stated that all the appointment information will also be available on [facility] as well.

## 2024-09-04 ENCOUNTER — OFFICE VISIT (OUTPATIENT)
Age: 34
End: 2024-09-04
Payer: COMMERCIAL

## 2024-09-04 ENCOUNTER — TELEPHONE (OUTPATIENT)
Age: 34
End: 2024-09-04

## 2024-09-04 VITALS
RESPIRATION RATE: 16 BRPM | HEART RATE: 71 BPM | HEIGHT: 65 IN | BODY MASS INDEX: 30.82 KG/M2 | SYSTOLIC BLOOD PRESSURE: 106 MMHG | WEIGHT: 185 LBS | OXYGEN SATURATION: 97 % | DIASTOLIC BLOOD PRESSURE: 70 MMHG

## 2024-09-04 DIAGNOSIS — N92.0 MENORRHAGIA WITH REGULAR CYCLE: ICD-10-CM

## 2024-09-04 DIAGNOSIS — F41.9 ANXIETY: Primary | ICD-10-CM

## 2024-09-04 PROCEDURE — 99214 OFFICE O/P EST MOD 30 MIN: CPT | Performed by: INTERNAL MEDICINE

## 2024-09-04 RX ORDER — SERTRALINE HYDROCHLORIDE 100 MG/1
100 TABLET, FILM COATED ORAL DAILY
Qty: 90 TABLET | Refills: 0 | Status: SHIPPED | OUTPATIENT
Start: 2024-09-04 | End: 2024-12-03

## 2024-09-04 NOTE — PROGRESS NOTES
"Ambulatory Visit  Name: Rivka Shepherd      : 1990      MRN: [id number]  Encounter Provider: Angela Emmanuel DO  Encounter Date: 2024   Encounter department: [facility] INTERNAL MEDICINE    Assessment & Plan   1. Anxiety  Assessment & Plan:  -moderate in severity  -unchanged despite initiation of sertraline 50mg daily, therefore will increase to 100mg daily  -now agreeable for CBT, will refer  -follow up in 3 months  Orders:  -     sertraline (ZOLOFT) 100 mg tablet; Take 1 tablet (100 mg total) by mouth daily  -     Ambulatory referral to Psych Services; Future  2. Menorrhagia with regular cycle  Comments:  -recommend she return to GYN for eval  Orders:  -     CBC and differential; Future       History of Present Illness     HPI    Started on sertraline  50mg daily one month ago.  Does not notice significant difference, was nauseated the first week but resolved.  Had a \"mom\" panic attack.    YARELIS-7 Flowsheet Screening    Flowsheet Row Most Recent Value   Over the last two weeks, how often have you been bothered by the following problems?     Feeling nervous, anxious, or on edge 1   Not being able to stop or control worrying 2   Worrying too much about different things 2   Trouble relaxing  2   Being so restless that it's hard to sit still 1   Becoming easily annoyed or irritable  1   Feeling afraid as if something awful might happen 0   How difficult have these problems made it for you to do your work, take care of things at home, or get along with other people?  Somewhat difficult   YARELIS Score  9        Got lightheaded when she got her period two weeks ago.  During that time, she started an iron supplement.      Review of Systems   Constitutional:  Negative for appetite change.   Gastrointestinal:  Negative for abdominal pain and nausea.   Genitourinary:  Positive for menstrual problem.   Psychiatric/Behavioral:  Negative for dysphoric mood and sleep disturbance. The patient is " "nervous/anxious.      Medical History Reviewed by provider this encounter:  Tobacco  Allergies  Meds  Problems  Med Hx  Surg Hx  Fam Hx       Current Outpatient Medications on File Prior to Visit   Medication Sig Dispense Refill   • acetaminophen (TYLENOL) 500 mg tablet Take 500 mg by mouth every 6 (six) hours as needed for mild pain       No current facility-administered medications on file prior to visit.      Social History     Tobacco Use   • Smoking status: Former     Current packs/day: 0.00     Types: Cigarettes     Quit date: 2022     Years since quittin.0   • Smokeless tobacco: Never   Vaping Use   • Vaping status: Never Used   Substance and Sexual Activity   • Alcohol use: Yes     Alcohol/week: 1.0 standard drink of alcohol     Types: 1 Glasses of wine per week     Comment: twice per week   • Drug use: Yes     Types: Marijuana     Comment: denies IVDA-daily Used THC  at 2230   • Sexual activity: Yes     Partners: Male     Birth control/protection: Condom Male     Objective     /70 (BP Location: Left arm, Patient Position: Sitting, Cuff Size: Large)   Pulse 71   Resp 16   Ht 5' 5\" (1.651 m)   Wt 83.9 kg (185 lb)   SpO2 97%   BMI 30.79 kg/m²     Physical Exam  Vitals reviewed.   Constitutional:       General: She is not in acute distress.  Skin:     Coloration: Skin is not pale.   Neurological:      Mental Status: She is alert and oriented to person, place, and time.       Administrative Statements           "

## 2024-09-04 NOTE — TELEPHONE ENCOUNTER
Contacted the patient regarding a routine referral to verify service needs,inform of current wait list and to add pt accordingly to [facility].   Left a voicemail for patient to contact the intake department at [phone].

## 2024-09-05 NOTE — ASSESSMENT & PLAN NOTE
-moderate in severity  -unchanged despite initiation of sertraline 50mg daily, therefore will increase to 100mg daily  -now agreeable for CBT, will refer  -follow up in 3 months

## 2024-09-12 ENCOUNTER — TELEPHONE (OUTPATIENT)
Age: 34
End: 2024-09-12

## 2024-09-12 NOTE — TELEPHONE ENCOUNTER
"Behavioral Health Integration Screening Questionnaire     Are you aware of the referred from your [facility] Provider  : Yes     Please advise interviewee that they need to answer all questions truthfully to allow for best care, and any misrepresentations of information may affect their ability to be seen at this clinic   => Was this discussed? Yes     If Minor Child (under age 18)    Who is/are the legal guardian(s) of the child?     Is there a custody agreement? No     If \"YES\"- Custody orders must be obtained prior to scheduling the first appointment  In addition, Consent to Treatment must be signed by all legal guardians prior to scheduling the first appointment    If \"NO\"- Consent to Treatment must be signed by all legal guardians prior to scheduling the first appointment    Behavioral Health Outpatient Intake History -     Presenting Problem (in patient's own words): anxiety and depression    Are there any communication barriers for this patient?     No                                               If yes, please describe barriers:       Are you taking any psychiatric medications? Yes     If \"YES\" -What are they Zoloft     If \"YES\" -Who prescribes?     Has the Patient abused alcohol or other substances in the last 6 months ? Yes  No concerns of substance abuse are reported.     If \"YES\" -What substance, How much, How often? alcohol and marijuana once a week      If illegal substance: Refer to [facility] (for ROXY) or [facility] Off[facility].   If Alcohol in excess of 10 drinks per week:  Refer to [facility] (for ROXY) or [facility] Offices    ACCEPTED as a patient Yes  If \"Yes\" Appointment Date: 9/24/2024 at 9:00 am    Referred Elsewhere? No  If “Yes” - (Where? Ex: Therapy Anywhere; [facility];  [facility], etc.)       Name of Insurance Co: [facility]  Insurance ID# [id number]  Insurance Phone # [phone]  If ins is primary or secondary? Primary  Name of Insurance: " [employer]  Insurance ID # [id number]  Insurance Phone # [phone]  If ins is primary or secondary? Secondary  If patient is a minor, parents information such as Name, D.O.B of guarantor.

## 2024-09-23 ENCOUNTER — TELEPHONE (OUTPATIENT)
Dept: BEHAVIORAL/MENTAL HEALTH CLINIC | Facility: CLINIC | Age: 34
End: 2024-09-23

## 2024-09-23 NOTE — TELEPHONE ENCOUNTER
Writer reached out to patient to inform that [facility] not in network for [facility] billing.  Patient wants to continue with appointment regardless.

## 2024-09-24 ENCOUNTER — OFFICE VISIT (OUTPATIENT)
Dept: BEHAVIORAL/MENTAL HEALTH CLINIC | Facility: CLINIC | Age: 34
End: 2024-09-24
Payer: COMMERCIAL

## 2024-09-24 DIAGNOSIS — F41.9 ANXIETY: ICD-10-CM

## 2024-09-24 DIAGNOSIS — F41.8 ANXIETY WITH DEPRESSION: Primary | ICD-10-CM

## 2024-09-24 PROCEDURE — 90791 PSYCH DIAGNOSTIC EVALUATION: CPT
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
## 2024-09-30 ENCOUNTER — SOCIAL WORK (OUTPATIENT)
Dept: BEHAVIORAL/MENTAL HEALTH CLINIC | Facility: CLINIC | Age: 34
End: 2024-09-30
Payer: COMMERCIAL

## 2024-09-30 DIAGNOSIS — F41.8 ANXIETY WITH DEPRESSION: Primary | ICD-10-CM

## 2024-09-30 PROCEDURE — 90837 PSYTX W PT 60 MINUTES: CPT

## 2024-09-30 NOTE — BH TREATMENT PLAN
"Outpatient Behavioral Health Psychotherapy Treatment Plan    Rivka Shepherd  1990     Date of Initial Psychotherapy Assessment: 0924/2024   Date of Current Treatment Plan: 09/30/24  Treatment Plan Target Date: 01/30/2025  Treatment Plan Expiration Date: 03/30/2025    Diagnosis:   1. Anxiety with depression            Area(s) of Need: Meaningful activities, Socialization, and Assertive Communication    Long Term Goal 1 (in the client's own words): \" Work through the issues I have with my mom.\"      Stage of Change: Preparation    Target Date for completion: 01/20/2025     Anticipated therapeutic modalities: CBT and Supportive Therapy       People identified to complete this goal: Patient and Therapist      Objective 1: (identify the means of measuring success in meeting the objective): Utilize therapy to process my relationship with my mother and past traumas.      Objective 2: (identify the means of measuring success in meeting the objective):  Patient will improve her assertive communication skills.     Objective 3: (identify the means of measuring success in meeting the objective):  Patient will learn skills to help her set healthy boundaries.     Objective 4: (identify the means of measuring success in meeting the objective):  Utilize therapy to identify and deconstruct harmful core beliefs.        I am currently under the care of a [facility] psychiatric provider: No    My [facility] psychiatric provider is: N/A    I am currently taking psychiatric medications: Yes, as prescribed    I feel that I will be ready for discharge from mental health care when I reach the following (measurable goal/objective): When my panic attacks reduce to once every few months.  When I'm not feeling guilty on a daily basis.      For children and adults who have a legal guardian:   Has there been any change to custody orders and/or guardianship status? NA. If yes, attach updated documentation.    I have created my Crisis " Plan and have been offered a copy of this plan    Behavioral Health Treatment Plan [facility]: Diagnosis and Treatment Plan explained to Rivka Shepherd acknowledges an understanding of their diagnosis. Rivka Shepherd agrees to this treatment plan.    I have been offered a copy of this Treatment Plan-Yes

## 2024-09-30 NOTE — BH CRISIS PLAN
"Client Name: Rivka Shepherd       Client YOB: 1990    Agusto[employer]evon Safety Plan      Creation Date: 9/30/24 Update Date: 9/30/24   Created By: MEENU Marin Last Updated By: MEENU Marin      Step 1: Warning Signs:   Warning Signs   \"When my brain starts to spiral; I get stuck on a repeating thought.\"   Thoughts of self-harm or suicide   Triggers: Phone calls with my mom and movies with dads in them            Step 2: Internal Coping Strategies:   Internal Coping Strategies   Reading   Dishes   Counting   Guided Imagery            Step 3: People and social settings that provide distraction:   Name Contact Information   Angela (Sister) Number saved in phone   Dario Mcnamara (Brother) Number saved in phone   Deandra Joyce (co-worker) Number saved in phone   Ismael () Number saved in phone    [facility]           Step 4: People whom I can ask for help during a crisis:      Name Contact Information    Federica (Friend) Number saved in phone    Angela (Sister) Number saved in phone      Step 5: Professionals or agencies I can contact during a crisis:      Clinican/Agency Name Phone Emergency Contact    [facility] [phone] Dr. Emmanuel      [facility] Emergency [facility] Emergency Department Phone Emergency Department Address    [facility] [phone] [address], [place] [address]        Crisis Phone Numbers:   Suicide Prevention Lifeline: Call or Text  852 Crisis Text Line: Text HOME to 434-778   Please note: Some [facility] do not have a separate number for Child/Adolescent specific crisis. If your county is not listed under Child/Adolescent, please call the adult number for your county      Adult Crisis Numbers: Child/Adolescent Crisis Numbers   [facility]: [phone] [facility]: [phone]   [facility]: [phone] [facility]: [phone]   [facility]: [phone] [place], " "[place]: [phone]   [facility]: [phone] [place]: [phone]   [place]: [phone]   [facility]: [phone]   [facility]: [phone]   [facility]: [phone] (daytime) [phone] (after hours, weekends, holidays)      Step 6: Making the environment safer (plan for lethal means safety):   Plan: Gun; I can remove access by leaving my home  Knives; I can remove access by leaving my home    \"I won't do anything because I don't want to hurt people.\"     Optional: What is most important to me and worth living for?   My daughter; my goal is to see her play sports, play music, and see what kind of person she becomes  My siblings   My   My friends (Nuvia Clark, and Deandra)  Travel (Radha, [place])          Kim Safety Plan. Arleth Liz and Jonathan Lund. Used with permission of the authors.           "

## 2024-09-30 NOTE — PSYCH
Behavioral Health Psychotherapy Progress Note    Psychotherapy Provided: Individual Psychotherapy     1. Anxiety with depression          Goals addressed in session: Goal 1     DATA: Patient was present for her psychotherapy session today.  She presented with a stable mood and was pleasant and more open; eye contact was good.  She was casually dressed and appeared well groomed.  Patient shared that she was beating herself up for being guarded in our first session.  Therapist normalized the behavior and also acknowledged that she did open up quite a bit.  The primary focus of today's session was the development of the treatment plan and safety plan.  Therapist provided supportive therapy through validation and empathetic response as patient spoke about her relationship with her mother, which she feels is the primary source of her guilt, depression, and anxiety.  She shared how when the school made her mother aware of her self-harm that it was spoke about once and then brushed under the rug.  She expressed that it is important to her that she never becomes the type of mother her mother was to her daughter.  She understands she can't change her mother, but wants to learn how to accept that her mother is the way she is and that she is not defined by her mother.  Note: patient struggled with the development of her safety plan; she expressed that it is difficult for her to ask for help or to express her feelings to others.      During this session, this clinician used the following therapeutic modalities: Client-centered Therapy and Supportive Psychotherapy    Substance Abuse was not addressed during this session. If the client is diagnosed with a co-occurring substance use disorder, please indicate any changes in the frequency or amount of use: N/A. Stage of change for addressing substance use diagnoses: No substance use/Not applicable    ASSESSMENT:  Rivka Shepherd presents with a Euthymic/ normal mood.     her  "affect is Normal range and intensity and Tearful, which is congruent, with her mood and the content of the session. The client has not made progress on their goals.     Rivka Shepherd presents with a low risk of suicide, none risk of self-harm, and none risk of harm to others.    For any risk assessment that surpasses a \"low\" rating, a safety plan must be developed.    A safety plan was indicated: No  If yes, describe in detail: N/A    PLAN: Between sessions, Rivka Shepherd will utilize coping skills when feeling triggered. At the next session, the therapist will use Engagement Strategies and Supportive Psychotherapy to build rapport and support patient as she addresses her treatment goals.    Behavioral Health Treatment Plan and Discharge Planning: Rivka Shepherd is aware of and agrees to continue to work on their treatment plan. They have identified and are working toward their discharge goals-Yes    Visit start and stop times:    09/30/24  Start Time: 0900  Stop Time: 0956  Total Visit Time: 56 minutes  "

## 2024-10-10 ENCOUNTER — SOCIAL WORK (OUTPATIENT)
Dept: BEHAVIORAL/MENTAL HEALTH CLINIC | Facility: CLINIC | Age: 34
End: 2024-10-10
Payer: COMMERCIAL

## 2024-10-10 DIAGNOSIS — F41.8 ANXIETY WITH DEPRESSION: Primary | ICD-10-CM

## 2024-10-10 PROCEDURE — 90837 PSYTX W PT 60 MINUTES: CPT

## 2024-10-10 NOTE — PSYCH
Behavioral Health Psychotherapy Progress Note    Psychotherapy Provided: Individual Psychotherapy     1. Anxiety with depression          Goals addressed in session: Goal 1     DATA: Patient was present for her psychotherapy session.  She was polite and engaged; eye contact was good.  She was nicely dressed and appeared well groomed.  She reported no change since our last session.  Therapist used open ended questions to learn more about patient's relationship with her mother and her core beliefs about herself.  Therapist learned that patient doesn't value her own needs and tends to give into others.  Patient is concerned that this may be untrue and that she is actually selfish.  Patient did come to realize that she may not know what her needs are.  She was a little tearful as we spoke about.  It was also evident that her feelings regarding her mother are unclear.  Initially, she reported that her mother was a good mother, but we questioned how this could be fully true if she is concerned about being like her.  She reported that her mom has poor boundaries and has verbalized that she gets steve out of crossing boundaries.  Her mom is also judgemental and needs control.      During this session, this clinician used the following therapeutic modalities: Engagement Strategies and Supportive Psychotherapy    Substance Abuse was not addressed during this session. If the client is diagnosed with a co-occurring substance use disorder, please indicate any changes in the frequency or amount of use: N/A. Stage of change for addressing substance use diagnoses: No substance use/Not applicable    ASSESSMENT:  Rivka Shepherd presents with a Dysthymic mood.     her affect is Normal range and intensity, which is congruent, with her mood and the content of the session. The client has made progress on their goals.     Rivka Shepherd presents with a minimal risk of suicide, minimal risk of self-harm, and none risk of harm to  "others.    For any risk assessment that surpasses a \"low\" rating, a safety plan must be developed.    A safety plan was indicated: No  If yes, describe in detail: N/A    PLAN: Between sessions, Rivka Shepherd will tune into actions for indicators of prioritizing other's needs over her own and use conscious stream of writing to disrupt ruminating thoughts. At the next session, the therapist will use Engagement Strategies and Supportive Psychotherapy to further explore patient's area of needs.    Behavioral Health Treatment Plan and Discharge Planning: Rivka Shepherd is aware of and agrees to continue to work on their treatment plan. They have identified and are working toward their discharge goals-Yes    Visit start and stop times:    10/10/24  Start Time: 0859  Stop Time: 0953  Total Visit Time: 54 minutes  "

## 2024-10-22 ENCOUNTER — SOCIAL WORK (OUTPATIENT)
Dept: BEHAVIORAL/MENTAL HEALTH CLINIC | Facility: CLINIC | Age: 34
End: 2024-10-22
Payer: COMMERCIAL

## 2024-10-22 DIAGNOSIS — F41.8 ANXIETY WITH DEPRESSION: Primary | ICD-10-CM

## 2024-10-22 PROCEDURE — 90837 PSYTX W PT 60 MINUTES: CPT

## 2024-10-22 NOTE — PSYCH
Behavioral Health Psychotherapy Progress Note    Psychotherapy Provided: Individual Psychotherapy     1. Anxiety with depression          Goals addressed in session: Goal 1     DATA: Patient was present for her psychotherapy session.  She was pleasant, but remains somewhat guarded; eye contact was good.  She was causally dressed and appeared adequately groomed.  She is unsure if there has been any improvements in her depression; she spoke about feeling like her old self at times, but indicated that her old self wasn't great.  She explained that prior to this bout of depression she had more mood swings, which impacted her relationship with others.  The mood swings seemed mild and consistent with depression.  She reported no improvement in her sleep either.  She did try the conscious stream of writing and it did disrupt her thoughts.  She also tuned into her actions and agreed that she is passive; however, rationalized this.  Therapist discussed some of the risks of this and encouraged her to continue to tune into this behavior to ensure that she isn't always putting everyone else's needs before her own.  We also briefly spoke about the holiday season, which is not triggering, but she does find annoying because it revolves around her 's family.  Lastly, patient was made aware of therapist's resignation, which she handled appropriately.  She would like to be transferred to another [facility] therapist.      During this session, this clinician used the following therapeutic modalities: Cognitive Behavioral Therapy and Supportive Psychotherapy    Substance Abuse was not addressed during this session. If the client is diagnosed with a co-occurring substance use disorder, please indicate any changes in the frequency or amount of use: N/A. Stage of change for addressing substance use diagnoses: No substance use/Not applicable    ASSESSMENT:  Rivka Shepherd presents with a Euthymic/ normal mood.     her affect  "is Constricted, which is congruent, with her mood and the content of the session. The client has made progress on their goals.     Rivka Shepherd presents with a minimal risk of suicide, none risk of self-harm, and none risk of harm to others.    For any risk assessment that surpasses a \"low\" rating, a safety plan must be developed.    A safety plan was indicated: No  If yes, describe in detail: N/A    PLAN: Between sessions, Rivka Shepherd will continue to utilize a conscious stream of writing to disrupt ruminating thoughts and read material on cognitive distortions. At the next session, the therapist will use Cognitive Behavioral Therapy and Psychoeducation  to address cognitive distortions.    Behavioral Health Treatment Plan and Discharge Planning: Rivka Shepherd is aware of and agrees to continue to work on their treatment plan. They have identified and are working toward their discharge goals-Yes    Visit start and stop times:    10/22/24  Start Time: 0859  Stop Time: 0952  Total Visit Time: 53 minutes  "

## 2024-10-29 ENCOUNTER — TELEPHONE (OUTPATIENT)
Dept: PSYCHIATRY | Facility: CLINIC | Age: 34
End: 2024-10-29

## 2024-11-04 ENCOUNTER — TELEPHONE (OUTPATIENT)
Dept: BEHAVIORAL/MENTAL HEALTH CLINIC | Facility: CLINIC | Age: 34
End: 2024-11-04

## 2024-11-04 NOTE — TELEPHONE ENCOUNTER
Therapist left message for patient requesting a c/b if she wants to reschedule the appointment she cancelled.

## 2024-11-18 ENCOUNTER — DOCUMENTATION (OUTPATIENT)
Dept: BEHAVIORAL/MENTAL HEALTH CLINIC | Facility: CLINIC | Age: 34
End: 2024-11-18

## 2024-11-18 DIAGNOSIS — F41.8 ANXIETY WITH DEPRESSION: Primary | ICD-10-CM

## 2024-11-18 NOTE — PROGRESS NOTES
TRANSFER SUMMARY    [facility]    Patient Name Rivka Shepherd     Date of Birth: 34 y.o. 1990      MRN: [id number]    Admission Date:  09/24/2024    Date of Transfer: November 18, 2024    Admission Diagnosis:     Anxiety with Depression    Current Diagnosis:     1. Anxiety with depression          Reason for Admission: Rivka presented for treatment due to depressive symptoms and anxiety symptoms. Primary complaints included DEPRESSIVE SYMPTOMS: depressed mood, difficulty with decision making, poor concentration, irritability, difficulty sleeping, excessive crying,  fatigue, and decreased libido and ANXIETY SYMPTOMS: feeling nervous, worrying daily, feeling agitated, racing thoughts, fatigues easily, chest tightness, dizziness, and hot flashes . Increased anxiety since having a child 16 months ago. Additionally, she had a significant health issue that made her question if she knows her own body.     Progress in Treatment: Rivka was seen for Medication Management and Individual Couseling. During the course of treatment she was see for 4 individual psychotherapy sessions focused on alleviating symptoms of anxiety and depression.  Patient felt that this could be done by working through issues she has with her mother.  We did determine that she has porous boundaries and tends to be passive.  We started to explore where this stems and what her core beliefs are about herself.  Unfortunately, progress regarding her symptoms were minimal due to the limited number of sessions we had.  Additionally, patient cancelled her last session with this therapist and when therapist attempted to re-schedule this patient did not respond.      Episodes of Higher Level of Care: No    Transfer request Initiated by: Psychiatrist: None Therapist:  Arely Mercado LCSW    Reason for Transfer Request: clinician leaving practice    Does this individual need a clinician with  specialized training/expertise?: No    Is this client working with any other [facility] Providers/Therapists? Psychiatrist: None Therapist: None    Other pertinent issues: None    Are there any specific individuals who would be a “best fit” or who have already agreed to accept this transfer request?      Psychiatrist: None   Therapist: Ottoniel Chaudhry  Rationale: Not Applicable    Attempts to maintain the current therapeutic relationship: Not Applicable    Transfer request routed to Therapist for input and/or approval.     Comments from other involved providers and/or clinical coordinator : None    Arely Mercado, MSW11/18/24

## 2024-11-30 DIAGNOSIS — F41.9 ANXIETY: ICD-10-CM

## 2024-12-02 RX ORDER — SERTRALINE HYDROCHLORIDE 100 MG/1
100 TABLET, FILM COATED ORAL DAILY
Qty: 90 TABLET | Refills: 0 | Status: SHIPPED | OUTPATIENT
Start: 2024-12-02

## 2024-12-23 ENCOUNTER — TELEPHONE (OUTPATIENT)
Dept: SURGICAL ONCOLOGY | Facility: CLINIC | Age: 34
End: 2024-12-23

## 2024-12-23 NOTE — TELEPHONE ENCOUNTER
Called patient for a second attempt to reschedule appointment with Dr. Jc on 7/31/25 to a different time or date.

## 2025-01-03 ENCOUNTER — TELEPHONE (OUTPATIENT)
Dept: SURGICAL ONCOLOGY | Facility: CLINIC | Age: 35
End: 2025-01-03

## 2025-01-03 NOTE — TELEPHONE ENCOUNTER
Called patient and rescheduled appointment with Dr. Jc, patient is scheduled on 8/6/25 in [place] and went over CT scheduled prior to appt.

## 2025-03-05 DIAGNOSIS — F41.9 ANXIETY: ICD-10-CM

## 2025-03-05 RX ORDER — SERTRALINE HYDROCHLORIDE 100 MG/1
100 TABLET, FILM COATED ORAL DAILY
Qty: 90 TABLET | Refills: 1 | Status: SHIPPED | OUTPATIENT
Start: 2025-03-05

## 2025-03-20 ENCOUNTER — TELEPHONE (OUTPATIENT)
Dept: SURGICAL ONCOLOGY | Facility: CLINIC | Age: 35
End: 2025-03-20

## 2025-03-20 NOTE — TELEPHONE ENCOUNTER
Called patient and rescheduled appointment with Dr. Jc on 8/6/25 to 8/11/25 in [place] and went over CT appointment scheduled prior to appointment.

## 2025-07-18 ENCOUNTER — TELEPHONE (OUTPATIENT)
Dept: SURGICAL ONCOLOGY | Facility: CLINIC | Age: 35
End: 2025-07-18

## 2025-07-18 NOTE — TELEPHONE ENCOUNTER
APPOINTMENT RESCHEDULE   DATE OF CALL 7/18/2025       PROVIDER NAME  Dr. Jc     ORIGINAL APPOINTMENT DATE & TIME 8/14/2025 @ 9:45 am     CALL STATUS Completed     NEW APPOINTMENT DATE & TIME      REASON FOR RESCHEDULE Patient canceled via MyChart.  Called and spoke with patient and she said that she does not want to reschedule her appointment. She said that insurance won't pay for the scan so she is going to just cancel everything.      NAME OF PERSON YOU SPOKE WITH Patient

## (undated) DEVICE — HEMOSTATIC MATRIX SURGIFLO 8ML W/THROMBIN

## (undated) DEVICE — SUT MONOCRYL 4-0 SH 27 IN Y415H

## (undated) DEVICE — SUT VICRYL 0 CTX 36 IN J978H

## (undated) DEVICE — FENESTRATED BIPOLAR FORCEPS: Brand: ENDOWRIST

## (undated) DEVICE — TELFA NON-ADHERENT ABSORBENT DRESSING: Brand: TELFA

## (undated) DEVICE — HEAVY DUTY TABLE COVER: Brand: CONVERTORS

## (undated) DEVICE — AIRSEAL TUBE SMOKE EVAC LUMENX3 FILTERED

## (undated) DEVICE — TROCAR PORT ACCESS 12 X120MM W/BLDLS OPTICAL TIP AIRSEAL

## (undated) DEVICE — ADHESIVE SKN CLSR HISTOACRYL FLEX 0.5ML LF

## (undated) DEVICE — ARM DRAPE

## (undated) DEVICE — GLOVE PI ULTRA TOUCH SZ 6

## (undated) DEVICE — SKIN MARKER DUAL TIP WITH RULER CAP, FLEXIBLE RULER AND LABELS: Brand: DEVON

## (undated) DEVICE — GLOVE INDICATOR UNDERGLOVE SZ 6 BLUE

## (undated) DEVICE — SUT MONOCRYL 4-0 PS-2 18 IN Y496G

## (undated) DEVICE — VISUALIZATION SYSTEM: Brand: CLEARIFY

## (undated) DEVICE — BLADELESS OBTURATOR: Brand: WECK VISTA

## (undated) DEVICE — TIP COVER ACCESSORY

## (undated) DEVICE — PACK C-SECTION PBDS

## (undated) DEVICE — INTENDED FOR TISSUE SEPARATION, AND OTHER PROCEDURES THAT REQUIRE A SHARP SURGICAL BLADE TO PUNCTURE OR CUT.: Brand: BARD-PARKER SAFETY BLADES SIZE 15, STERILE

## (undated) DEVICE — EXOFIN PRECISION PEN HIGH VISCOSITY TOPICAL SKIN ADHESIVE: Brand: EXOFIN PRECISION PEN, 1G

## (undated) DEVICE — TIP-UP FENESTRATED GRASPER: Brand: ENDOWRIST

## (undated) DEVICE — COLUMN DRAPE

## (undated) DEVICE — STANDARD SURGICAL GOWN, L: Brand: CONVERTORS

## (undated) DEVICE — NEEDLE 23G X 1 1/2 SAFETY-GLIDE THIN WALL

## (undated) DEVICE — SUT VICRYL 0 CT-1 36 IN J946H

## (undated) DEVICE — CANNULA SEAL

## (undated) DEVICE — 2, DISPOSABLE SUCTION/IRRIGATOR WITHOUT DISPOSABLE TIP: Brand: STRYKEFLOW

## (undated) DEVICE — DRAPE SHEET X-LG

## (undated) DEVICE — SUT VICRYL PLUS 0 UR-6 27IN VCP603H

## (undated) DEVICE — SUT VICRYL 4-0 KS 27 IN J662H

## (undated) DEVICE — DRAPE SURGIKIT SADDLE BAG LAP

## (undated) DEVICE — MEDI-VAC YANKAUER SUCTION HANDLE W/STRAIGHT TIP & CONTROL VENT: Brand: CARDINAL HEALTH

## (undated) DEVICE — SPONGE LAP 18 X 18 IN STRL RFD

## (undated) DEVICE — SYRINGE 10ML LL

## (undated) DEVICE — GAUZE ROLL KITTNER

## (undated) DEVICE — BETHLEHEM MAJOR GENERAL PACK: Brand: CARDINAL HEALTH

## (undated) DEVICE — SYNCHROSEAL: Brand: DA VINCI ENERGY

## (undated) DEVICE — ASTOUND STANDARD SURGICAL GOWN, XL: Brand: CONVERTORS

## (undated) DEVICE — GLOVE SRG BIOGEL 6.5

## (undated) DEVICE — PENCIL ELECTROSURG E-Z CLEAN -0035H

## (undated) DEVICE — INTENDED FOR TISSUE SEPARATION, AND OTHER PROCEDURES THAT REQUIRE A SHARP SURGICAL BLADE TO PUNCTURE OR CUT.: Brand: BARD-PARKER SAFETY BLADES SIZE 11, STERILE

## (undated) DEVICE — CHLORAPREP HI-LITE 26ML ORANGE

## (undated) DEVICE — INSUFFLATION NEEDLE TO ESTABLISH PNEUMOPERITONEUM.: Brand: INSUFFLATION NEEDLE

## (undated) DEVICE — GAUZE SPONGES,16 PLY: Brand: CURITY

## (undated) DEVICE — MONOPOLAR CURVED SCISSORS: Brand: ENDOWRIST

## (undated) DEVICE — ADHESIVE SKIN CLOSURE SYS EXOFIN FUSION 22CM

## (undated) DEVICE — TRAY FOLEY 16FR URIMETER SURESTEP

## (undated) DEVICE — ADHESIVE SKIN HIGH VISCOSITY EXOFIN 1ML

## (undated) DEVICE — SURGICEL 4 X 8IN

## (undated) DEVICE — 3M™ STERI-STRIP™ REINFORCED ADHESIVE SKIN CLOSURES, R1542, 1/4 IN X 1-1/2 IN (6 MM X 38 MM), 6 STRIPS/ENVELOPE: Brand: 3M™ STERI-STRIP™

## (undated) DEVICE — CLAMP TOWEL TUBING DISPOSABLE

## (undated) DEVICE — 3M™ IOBAN™ 2 ANTIMICROBIAL INCISE DRAPE 6650EZ: Brand: IOBAN™ 2

## (undated) DEVICE — TELFA ADHESIVE ISLAND DRESSING: Brand: TELFA

## (undated) DEVICE — Device

## (undated) DEVICE — SUT PLAIN 2-0 CTX 27 IN 872H

## (undated) DEVICE — HEM-O-LOK CLIP CARTRIDGE MED/LARGE DA VINCI SI/XI

## (undated) DEVICE — ELECTRODE BLADE MOD E-Z CLEAN 2.5IN 6.4CM -0012M

## (undated) DEVICE — ABDOMINAL PAD: Brand: DERMACEA

## (undated) DEVICE — ELECTRO LUBE IS A SINGLE PATIENT USE DEVICE THAT IS INTENDED TO BE USED ON ELECTROSURGICAL ELECTRODES TO REDUCE STICKING.: Brand: KEY SURGICAL ELECTRO LUBE